# Patient Record
Sex: MALE | Race: WHITE | URBAN - METROPOLITAN AREA
[De-identification: names, ages, dates, MRNs, and addresses within clinical notes are randomized per-mention and may not be internally consistent; named-entity substitution may affect disease eponyms.]

---

## 2017-03-30 ENCOUNTER — OFFICE VISIT (OUTPATIENT)
Dept: ORTHOPEDIC SURGERY | Age: 67
End: 2017-03-30

## 2017-03-30 VITALS
SYSTOLIC BLOOD PRESSURE: 128 MMHG | BODY MASS INDEX: 28.56 KG/M2 | HEIGHT: 67 IN | HEART RATE: 80 BPM | WEIGHT: 182 LBS | DIASTOLIC BLOOD PRESSURE: 74 MMHG

## 2017-03-30 DIAGNOSIS — M79.671 RIGHT FOOT PAIN: ICD-10-CM

## 2017-03-30 DIAGNOSIS — M67.379 TRANSIENT SYNOVITIS, UNSPECIFIED ANKLE AND FOOT: ICD-10-CM

## 2017-03-30 DIAGNOSIS — M79.672 LEFT FOOT PAIN: Primary | ICD-10-CM

## 2017-03-30 PROCEDURE — 3017F COLORECTAL CA SCREEN DOC REV: CPT | Performed by: PODIATRIST

## 2017-03-30 PROCEDURE — 73630 X-RAY EXAM OF FOOT: CPT | Performed by: PODIATRIST

## 2017-03-30 PROCEDURE — G8420 CALC BMI NORM PARAMETERS: HCPCS | Performed by: PODIATRIST

## 2017-03-30 PROCEDURE — 4004F PT TOBACCO SCREEN RCVD TLK: CPT | Performed by: PODIATRIST

## 2017-03-30 PROCEDURE — G8484 FLU IMMUNIZE NO ADMIN: HCPCS | Performed by: PODIATRIST

## 2017-03-30 PROCEDURE — 99203 OFFICE O/P NEW LOW 30 MIN: CPT | Performed by: PODIATRIST

## 2017-03-30 PROCEDURE — 1123F ACP DISCUSS/DSCN MKR DOCD: CPT | Performed by: PODIATRIST

## 2017-03-30 PROCEDURE — G8427 DOCREV CUR MEDS BY ELIG CLIN: HCPCS | Performed by: PODIATRIST

## 2017-03-30 PROCEDURE — 4040F PNEUMOC VAC/ADMIN/RCVD: CPT | Performed by: PODIATRIST

## 2017-03-31 ENCOUNTER — ORTHOTIC/BRACE ENCOUNTER (OUTPATIENT)
Dept: ORTHOPEDIC SURGERY | Age: 67
End: 2017-03-31

## 2017-04-07 ENCOUNTER — ORTHOTIC/BRACE ENCOUNTER (OUTPATIENT)
Dept: ORTHOPEDIC SURGERY | Age: 67
End: 2017-04-07

## 2017-04-07 DIAGNOSIS — M67.379 TRANSIENT SYNOVITIS, UNSPECIFIED ANKLE AND FOOT: Primary | ICD-10-CM

## 2017-04-07 PROCEDURE — L3020 FOOT LONGITUD/METATARSAL SUP: HCPCS | Performed by: PEDORTHIST

## 2017-04-26 ENCOUNTER — ORTHOTIC/BRACE ENCOUNTER (OUTPATIENT)
Dept: ORTHOPEDIC SURGERY | Age: 67
End: 2017-04-26

## 2018-12-17 ENCOUNTER — HOSPITAL ENCOUNTER (OUTPATIENT)
Dept: PHYSICAL THERAPY | Age: 68
Setting detail: THERAPIES SERIES
Discharge: HOME | End: 2018-12-17
Payer: MEDICARE

## 2018-12-17 PROCEDURE — 97140 MANUAL THERAPY 1/> REGIONS: CPT | Performed by: PHYSICAL THERAPIST

## 2018-12-17 PROCEDURE — 97110 THERAPEUTIC EXERCISES: CPT | Performed by: PHYSICAL THERAPIST

## 2018-12-17 PROCEDURE — G8982 BODY POS GOAL STATUS: HCPCS | Performed by: PHYSICAL THERAPIST

## 2018-12-17 PROCEDURE — 97161 PT EVAL LOW COMPLEX 20 MIN: CPT | Performed by: PHYSICAL THERAPIST

## 2018-12-17 PROCEDURE — G8981 BODY POS CURRENT STATUS: HCPCS | Performed by: PHYSICAL THERAPIST

## 2018-12-17 NOTE — FLOWSHEET NOTE
[address] [facility]    [facility] Therapy Daily Treatment Note  Date:  2018    Patient Name:  Daiana Gayle    :  1950  MRN: [id number]  Restrictions/Precautions:    Medical/Treatment Diagnosis Information:  · Diagnosis: Lumbar DDD  · Treatment Diagnosis: Low Back Pain  Insurance/Certification information:   Medicare with Secondary   Physician Information:  Referring Practitioner: Dr. Donita Carbajal of care signed (Y/N):     Date of Patient follow up with Physician:     G-Code (if applicable):      Date G-Code Applied:         Progress Note: []  Yes  []  No  Next due by: Visit #10      Latex Allergy:  [x]NO      []YES  Preferred Language for Healthcare:   [x]English       []other:    Visit # Insurance Allowable   1 Medicare     Auth Required   []  Yes    [x] No    Visits Approved  Date Ranged-     Pain level:  3-5/10     SUBJECTIVE:  See eval    OBJECTIVE: See eval  Observation:   Test measurements:      RESTRICTIONS/PRECAUTIONS: Thyroid Cancer    Exercises/Interventions:       Script-18  Stretching Repetitions/Resistance Nots/Last Progression   SKC 2x30\" each    DKC     Piriformis Cross Over 2x30\" each    Thoracic Ext In Chair 15x5\"    Real Said  3x30\"    Prone on Elbows 2x30\"    Prayer Stretch     Hand Heel Rock     Cat/Cow     LTR                    Exercises     Bridge      SLR Flex     SLR Abd     SLR Ext     Clamshells     TA / Pilar Rias / Bynum Rory 15x5\" PPR   TA March     Prone Plank     Side Plank     Quadriped UE/ LE/ Birddog     Squats     [facility] Kick                 Manual      Hamstring Stretch     SKC     Piriformis Stretch      ITB Stretch      Prone Quad Stretch      Traction x4'    LAD to each hip.  x4' x2' on each hip.     Lumbar PA Grade-      Supine Lumbar Roll     SL Lumbar      Long Axis Hip Distraction Grade       Therapeutic Exercise and NMR EXR  [] (24255) Provided verbal/tactile cueing for as the patients discharge from physical therapy.      Electronically signed by: Les Jay Selestino Danger, [address]

## 2018-12-28 ENCOUNTER — HOSPITAL ENCOUNTER (OUTPATIENT)
Dept: PHYSICAL THERAPY | Age: 68
Setting detail: THERAPIES SERIES
Discharge: HOME | End: 2018-12-28
Payer: MEDICARE

## 2018-12-28 PROCEDURE — 97140 MANUAL THERAPY 1/> REGIONS: CPT | Performed by: PHYSICAL THERAPIST

## 2018-12-28 PROCEDURE — 97110 THERAPEUTIC EXERCISES: CPT | Performed by: PHYSICAL THERAPIST

## 2019-01-03 ENCOUNTER — OFFICE VISIT (OUTPATIENT)
Dept: ORTHOPEDIC SURGERY | Age: 69
End: 2019-01-03
Payer: MEDICARE

## 2019-01-03 VITALS
DIASTOLIC BLOOD PRESSURE: 74 MMHG | BODY MASS INDEX: 28.58 KG/M2 | SYSTOLIC BLOOD PRESSURE: 135 MMHG | WEIGHT: 182.1 LBS | HEART RATE: 87 BPM | HEIGHT: 67 IN

## 2019-01-03 DIAGNOSIS — M77.42 METATARSALGIA OF BOTH FEET: ICD-10-CM

## 2019-01-03 DIAGNOSIS — M20.42 ACQUIRED BILATERAL HAMMER TOES: Primary | ICD-10-CM

## 2019-01-03 DIAGNOSIS — M77.41 METATARSALGIA OF BOTH FEET: ICD-10-CM

## 2019-01-03 DIAGNOSIS — M20.41 ACQUIRED BILATERAL HAMMER TOES: Primary | ICD-10-CM

## 2019-01-03 PROBLEM — M75.102 TEAR OF LEFT ROTATOR CUFF: Status: ACTIVE | Noted: 2018-09-06

## 2019-01-03 PROCEDURE — 4040F PNEUMOC VAC/ADMIN/RCVD: CPT | Performed by: PODIATRIST

## 2019-01-03 PROCEDURE — G8419 CALC BMI OUT NRM PARAM NOF/U: HCPCS | Performed by: PODIATRIST

## 2019-01-03 PROCEDURE — 1123F ACP DISCUSS/DSCN MKR DOCD: CPT | Performed by: PODIATRIST

## 2019-01-03 PROCEDURE — G8428 CUR MEDS NOT DOCUMENT: HCPCS | Performed by: PODIATRIST

## 2019-01-03 PROCEDURE — 99213 OFFICE O/P EST LOW 20 MIN: CPT | Performed by: PODIATRIST

## 2019-01-03 PROCEDURE — G8484 FLU IMMUNIZE NO ADMIN: HCPCS | Performed by: PODIATRIST

## 2019-01-03 PROCEDURE — 4004F PT TOBACCO SCREEN RCVD TLK: CPT | Performed by: PODIATRIST

## 2019-01-03 PROCEDURE — 3017F COLORECTAL CA SCREEN DOC REV: CPT | Performed by: PODIATRIST

## 2019-01-03 PROCEDURE — 1101F PT FALLS ASSESS-DOCD LE1/YR: CPT | Performed by: PODIATRIST

## 2019-01-03 RX ORDER — FLUOROMETHOLONE 0.1 %
SUSPENSION, DROPS(FINAL DOSAGE FORM)(ML) OPHTHALMIC (EYE)
Refills: 6 | COMMUNITY
Start: 2018-12-28

## 2019-01-03 RX ORDER — CLINDAMYCIN HYDROCHLORIDE 300 MG/1
CAPSULE ORAL
Refills: 1 | COMMUNITY
Start: 2018-12-31

## 2019-01-09 ENCOUNTER — HOSPITAL ENCOUNTER (OUTPATIENT)
Dept: PHYSICAL THERAPY | Age: 69
Setting detail: THERAPIES SERIES
Discharge: HOME | End: 2019-01-09
Payer: MEDICARE

## 2019-08-26 ENCOUNTER — OFFICE VISIT (OUTPATIENT)
Dept: ORTHOPEDIC SURGERY | Age: 69
End: 2019-08-26
Payer: MEDICARE

## 2019-08-26 VITALS
SYSTOLIC BLOOD PRESSURE: 130 MMHG | BODY MASS INDEX: 28.58 KG/M2 | DIASTOLIC BLOOD PRESSURE: 70 MMHG | WEIGHT: 182.1 LBS | HEART RATE: 78 BPM | HEIGHT: 67 IN

## 2019-08-26 DIAGNOSIS — M77.41 METATARSALGIA OF BOTH FEET: Primary | ICD-10-CM

## 2019-08-26 DIAGNOSIS — M20.42 ACQUIRED BILATERAL HAMMER TOES: ICD-10-CM

## 2019-08-26 DIAGNOSIS — M77.42 METATARSALGIA OF BOTH FEET: Primary | ICD-10-CM

## 2019-08-26 DIAGNOSIS — M67.379 TRANSIENT SYNOVITIS, UNSPECIFIED ANKLE AND FOOT: ICD-10-CM

## 2019-08-26 DIAGNOSIS — M20.41 ACQUIRED BILATERAL HAMMER TOES: ICD-10-CM

## 2019-08-26 PROCEDURE — 1123F ACP DISCUSS/DSCN MKR DOCD: CPT | Performed by: PODIATRIST

## 2019-08-26 PROCEDURE — 4004F PT TOBACCO SCREEN RCVD TLK: CPT | Performed by: PODIATRIST

## 2019-08-26 PROCEDURE — G8428 CUR MEDS NOT DOCUMENT: HCPCS | Performed by: PODIATRIST

## 2019-08-26 PROCEDURE — 99212 OFFICE O/P EST SF 10 MIN: CPT | Performed by: PODIATRIST

## 2019-08-26 PROCEDURE — 4040F PNEUMOC VAC/ADMIN/RCVD: CPT | Performed by: PODIATRIST

## 2019-08-26 PROCEDURE — 3017F COLORECTAL CA SCREEN DOC REV: CPT | Performed by: PODIATRIST

## 2019-08-26 PROCEDURE — G8419 CALC BMI OUT NRM PARAM NOF/U: HCPCS | Performed by: PODIATRIST

## 2019-08-26 RX ORDER — DOXYCYCLINE HYCLATE 100 MG/1
CAPSULE ORAL
Refills: 0 | COMMUNITY
Start: 2019-08-20

## 2019-08-26 RX ORDER — TRAMADOL HYDROCHLORIDE 50 MG/1
TABLET ORAL
Refills: 0 | COMMUNITY
Start: 2019-06-11

## 2019-08-28 ENCOUNTER — ORTHOTIC/BRACE ENCOUNTER (OUTPATIENT)
Dept: ORTHOPEDIC SURGERY | Age: 69
End: 2019-08-28

## 2019-09-06 ENCOUNTER — ORTHOTIC/BRACE ENCOUNTER (OUTPATIENT)
Dept: ORTHOPEDIC SURGERY | Age: 69
End: 2019-09-06
Payer: MEDICARE

## 2019-09-06 DIAGNOSIS — M77.42 METATARSALGIA OF BOTH FEET: Primary | ICD-10-CM

## 2019-09-06 DIAGNOSIS — M77.41 METATARSALGIA OF BOTH FEET: Primary | ICD-10-CM

## 2019-09-06 DIAGNOSIS — M20.42 ACQUIRED BILATERAL HAMMER TOES: ICD-10-CM

## 2019-09-06 DIAGNOSIS — M20.41 ACQUIRED BILATERAL HAMMER TOES: ICD-10-CM

## 2019-09-06 DIAGNOSIS — M67.379 TRANSIENT SYNOVITIS, UNSPECIFIED ANKLE AND FOOT: ICD-10-CM

## 2019-09-06 PROCEDURE — L3020 FOOT LONGITUD/METATARSAL SUP: HCPCS | Performed by: PEDORTHIST

## 2021-06-08 ENCOUNTER — OFFICE VISIT (OUTPATIENT)
Dept: ORTHOPEDIC SURGERY | Age: 71
End: 2021-06-08
Payer: MEDICARE

## 2021-06-08 DIAGNOSIS — M19.079 ARTHRITIS OF MIDFOOT: Primary | ICD-10-CM

## 2021-06-08 DIAGNOSIS — M20.42 ACQUIRED BILATERAL HAMMER TOES: ICD-10-CM

## 2021-06-08 DIAGNOSIS — M20.41 ACQUIRED BILATERAL HAMMER TOES: ICD-10-CM

## 2021-06-08 PROCEDURE — G8427 DOCREV CUR MEDS BY ELIG CLIN: HCPCS | Performed by: ORTHOPAEDIC SURGERY

## 2021-06-08 PROCEDURE — 99213 OFFICE O/P EST LOW 20 MIN: CPT | Performed by: ORTHOPAEDIC SURGERY

## 2021-06-08 PROCEDURE — 1123F ACP DISCUSS/DSCN MKR DOCD: CPT | Performed by: ORTHOPAEDIC SURGERY

## 2021-06-08 PROCEDURE — 3017F COLORECTAL CA SCREEN DOC REV: CPT | Performed by: ORTHOPAEDIC SURGERY

## 2021-06-08 PROCEDURE — 4040F PNEUMOC VAC/ADMIN/RCVD: CPT | Performed by: ORTHOPAEDIC SURGERY

## 2021-06-08 PROCEDURE — G8421 BMI NOT CALCULATED: HCPCS | Performed by: ORTHOPAEDIC SURGERY

## 2021-06-08 PROCEDURE — 4004F PT TOBACCO SCREEN RCVD TLK: CPT | Performed by: ORTHOPAEDIC SURGERY

## 2021-06-08 NOTE — PROGRESS NOTES
[facility]  Outpatient Progress Note  Will Sulema Leyva MD    Patient Name: Tien Hoang MRN: [id number]   Age: 66 y.o. YOB: 1950   Sex: male      [address] Complaint   Patient presents with    Foot Pain     Bilateral foot pain previous treatment wants to discuss orthotics rx        HISTORY OF PRESENT ILLNESS   Tien Hoang is a 66 y.o. male presents for evaluation of his feet. Has previously seen Dr. Jolynn Douglas and is treated with orthotics for midfoot arthritis and hammertoes. He is not really having any pain but he thinks he is ready for some new orthotics. He does quite a bit of walking for exercise and recreation. Pain Assessment  Location of Pain: Foot  Location Modifiers: Left  Severity of Pain: 2  Quality of Pain: Aching  Duration of Pain: Persistent  Frequency of Pain: Intermittent  Aggravating Factors: Exercise, Standing, Walking  Limiting Behavior: Some  Relieving Factors: Rest  Result of Injury: No  Work-Related Injury: No  Are there other pain locations you wish to document?: No    PAST MEDICAL HISTORY      Past Medical History:   Diagnosis Date    Thyroid cancer ([facility] [address] 1998       PAST SURGICAL HISTORY     Past Surgical History:   Procedure Laterality Date    COLONOSCOPY  1995    (no info)    HEMORRHOID SURGERY  1999    HERNIA REPAIR      SHOULDER ARTHROSCOPY  2009    right    SHOULDER ARTHROSCOPY  11/14/12    left    THROAT SURGERY      2009 for thyroid tissue removal    TOTAL THYROIDECTOMY  1998       MEDICATIONS     Current Outpatient Medications   Medication Sig Dispense Refill    traMADol (ULTRAM) 50 MG tablet TAKE 1 TABLET PO TID PRN FOR BACK PAIN  0    fluorometholone (FML) 0.1 % ophthalmic suspension INSTILL 1 DROP INTO BOTH EYES UP TO 2 TIMES PER DAY  6    levocetirizine (XYZAL) 5 MG tablet Take 5 mg by mouth nightly.  Ginkgo Biloba 40 MG TABS Take  by mouth.         levothyroxine (SYNTHROID) 300 MCG tablet Take 300 mcg by mouth daily.  amphetamine-dextroamphetamine (ADDERALL, 30MG,) 30 MG tablet Take 30 mg by mouth daily.  GuaiFENesin (MUCINEX PO) Take  by mouth 2 times daily.  Omega-3 Fatty Acids (FISH OIL) 1000 MG CAPS Take 1,000 mg by mouth 2 times daily.  vitamin E 400 UNIT capsule Take 400 Units by mouth daily.  Multiple Vitamins-Minerals (MULTIVITAMIN & MINERAL PO) Take  by mouth daily.  doxycycline hyclate (VIBRAMYCIN) 100 MG capsule TK ONE C PO BID (Patient not taking: Reported on 6/8/2021)  0    clindamycin (CLEOCIN) 300 MG capsule TK ONE C PO QID (Patient not taking: Reported on 6/8/2021)  1     No current facility-administered medications for this visit. ALLERGIES     Allergies   Allergen Reactions    Alum Hydroxide-Mag Carbonate     Sulfa Antibiotics Swelling, Rash and Hives       FAMILY HISTORY     Family History   Problem Relation Age of Onset    High Blood Pressure Mother        SOCIAL HISTORY     Social History     Socioeconomic History    Marital status:      Spouse name: Not on file    Number of children: Not on file    Years of education: Not on file    Highest education level: Not on file   Occupational History    Not on file   Tobacco Use    Smoking status: Current Every Day Smoker     Packs/day: 0.50     Years: 40.00     Pack years: 20.00    Smokeless tobacco: Never Used   Substance and Sexual Activity    Alcohol use:  Yes     Alcohol/week: 3.0 standard drinks     Types: 3 Glasses of wine per week     Comment: 2x week    Drug use: No    Sexual activity: Not on file   Other Topics Concern    Not on file   Social History Narrative    Not on file     Social Determinants of Health     Financial Resource Strain:     Difficulty of Paying Living Expenses:    Food Insecurity:     Worried About Running Out of Food in the Last Year:     [address] in the Last Year:    Transportation Needs:     Lack of Transportation (Medical):    Logan Crandall Lack of Transportation (Non-Medical):    Physical Activity:     Days of Exercise per Week:     Minutes of Exercise per Session:    Stress:     Feeling of Stress :    Social Connections:     Frequency of Communication with Friends and Family:     Frequency of Social Gatherings with Friends and Family:     Attends Holiness Services:     Active Member of Clubs or Organizations:     Attends Club or Organization Meetings:     Marital Status:    Intimate Partner Violence:     Fear of Current or Ex-Partner:     Emotionally Abused:     Physically Abused:     Sexually Abused:        REVIEW OF SYSTEMS   General: no fever, chills, night sweats, anorexia, malaise, fatigue, or weight change  Hematologic:  no unexplained bleeding or bruising  HEENT:   no nasal congestion, rhinorrhea, sore throat, or facial pain  Respiratory:  no cough, dyspnea, or chest pain  Cardiovascular:  no angina, BARNARD, PND, orthopnea, dependent edema, or palpitations  Gastrointestinal:  no nausea, vomiting, diarrhea, constipation, or abdominal pain  Genitourinary:  no urinary urgency, frequency, dysuria, or hematuria  Musculoskeletal: see HPI  Endocrine:  no heat or cold intolerance and no polyphagia, polydipsia, or polyuria  Skin:  no skin eruptions or changing lesions  Neurologic:  no focal weakness, numbness/tingling, tremor, or severe headache. See HPI. See HPI for pertinent positives. PHYSICAL EXAM   Vital Signs: There were no vitals filed for this visit. General appearance: healthy, alert, no distress  Skin: Skin color, texture, turgor normal. No rashes or lesions  HEENT: atraumatic, normocephalic. PERRL  Respiratory: Unlabored breathing  Lymphatic: No adenopathy   Neuro: Alert and oriented, normal distal sensation, normal bilateral DTRs  Vascular: Normal distal capillary and distal pulses  Muskuloskeletal Exam: Bilateral foot examination reveals no swelling erythema warmth ecchymosis or edema.   Mild flexible hammertoe deformities of the bilateral second toes. No callosities or skin lesions. Weightbearing alignment of the ankle hindfoot midfoot and forefoot are otherwise neutral.  Gait is normal.    RADIOLOGY   X-rays obtained and reviewed in office:  Views bilateral feet standing 3 views    Impression: Moderate degenerative changes noted in the mid feet at the naviculocuneiform joint    IMPRESSION     1. Arthritis of midfoot    2. Acquired bilateral hammer toes         PLAN   I had a lengthy discussion with patient today regarding diagnosis and treatment options and recommendations. I believe the patient will continue to tolerate his current level of activity with accommodative structured shoe wear and custom orthotics. He was given prescription for full-length semirigid orthotics as well as three-quarter length rigid orthotics for his dress shoes. FOLLOWUP     Return if symptoms worsen or fail to improve. Orders Placed This Encounter   Procedures    XR FOOT LEFT (MIN 3 VIEWS)     Standing Status:   Future     Number of Occurrences:   1     Standing Expiration Date:   6/7/2022     Order Specific Question:   Reason for exam:     Answer:   PAIN    XR FOOT RIGHT (MIN 3 VIEWS)     Standing Status:   Future     Number of Occurrences:   1     Standing Expiration Date:   6/7/2022     Order Specific Question:   Reason for exam:     Answer:   PAIN      No orders of the defined types were placed in this encounter.       Patient was instructed on appropriate use of braces, participation in home exercise programs, healthy lifestyle choices and weight loss as appropriate     Liberty Blanco MD

## 2021-10-05 ENCOUNTER — HOSPITAL ENCOUNTER (OUTPATIENT)
Dept: PHYSICAL THERAPY | Age: 71
Setting detail: THERAPIES SERIES
Discharge: HOME | End: 2021-10-05
Payer: MEDICARE

## 2021-10-05 PROCEDURE — 97110 THERAPEUTIC EXERCISES: CPT | Performed by: PHYSICAL THERAPIST

## 2021-10-05 PROCEDURE — 97161 PT EVAL LOW COMPLEX 20 MIN: CPT | Performed by: PHYSICAL THERAPIST

## 2021-10-05 PROCEDURE — 97012 MECHANICAL TRACTION THERAPY: CPT | Performed by: PHYSICAL THERAPIST

## 2021-10-05 PROCEDURE — 97140 MANUAL THERAPY 1/> REGIONS: CPT | Performed by: PHYSICAL THERAPIST

## 2021-10-05 NOTE — PLAN OF CARE
standing. Relevant Medical History:Cancer  Functional Disability Index: NDI: 2%      Pain Scale: 5/10  Easing factors: ibuprofen  Provocative factors: computer work, neck extension     Type: []Constant   [x]Intermittent  []Radiating [x]Localized []other:     Numbness/Tingling: none    Occupation/School:        Living Status/Prior Level of Function: Independent with ADLs and IADLs    OBJECTIVE:     CERV ROM Left Right   Cervical Flexion 45    Cervical Extension 30 pain    Cervical SB 20 20   Cervical rotation 60 60        ROM Left-WNL Right-WNL   Shoulder Flex     Shoulder Abd     Shoulder ER     Shoulder IR               Strength  Left Right   Shoulder Flex 5 5   Shoulder Scap 5 5   Shoulder ER 5 5   Shoulder IR 5 5             Reflexes Left Right   C5-6 Biceps 2 2   C5-6 Brachioradialis 2 2   C7-8 Triceps 2 2     Dermatomal Sensation:  - Dermatomal sensation was intact to light touch bilaterally throughout upper and lower extremities. Reflexes/Sensation:    [x]Dermatomes/Myotomes intact    [x]Reflexes equal and normal bilaterally   []Other:    Joint mobility:    []Normal    [x]Hypo   []Hyper    Palpation: sub-occipitals, L > R UT, levator    Functional Mobility/Transfers: WNL    Posture: mild forward, rounded shoulders    Bandages/Dressings/Incisions: n/a    Gait: (include devices/WB status): WNL     Orthopedic Special Tests:   Special Tests/Functional tests:  - Mcpherson's test: [] Positive   [] Negative   - Babinski: [] Positive   [] Negative   - Clonus: [] Positive   [x] Negative   - Sharp's Danish: [] Positive   [] Negative   - Modified C2 Kick Test: [] Positive   [] Negative   - Vertebral Artery Test: [] Positive   [x] Negative   -Hautard's Test: [] Positive   [] Negative   -Sheer Test: [] Positive   [] Negative     [x] Patient history, allergies, meds reviewed. Medical chart reviewed. See intake form.      Review Of Systems (ROS):  [x]Performed Review of systems (Integumentary, CardioPulmonary, Neurological) by intake and observation. Intake form has been scanned into medical record. Patient has been instructed to contact their primary care physician regarding ROS issues if not already being addressed at this time. Co-morbidities/Complexities (which will affect course of rehabilitation):   []None           Arthritic conditions   []Rheumatoid arthritis (M05.9)  []Osteoarthritis (M19.91)   Cardiovascular conditions   []Hypertension (I10)  []Hyperlipidemia (E78.5)  []Angina pectoris (I20)  []Atherosclerosis (I70)   Musculoskeletal conditions   []Disc pathology   []Congenital spine pathologies   []Prior surgical intervention  []Osteoporosis (M81.8)  []Osteopenia (M85.8)   Endocrine conditions   []Hypothyroid (E03.9)  []Hyperthyroid Gastrointestinal conditions   []Constipation (H52.92)   Metabolic conditions   []Morbid obesity (E66.01)  []Diabetes type 1(E10.65) or 2 (E11.65)   []Neuropathy (G60.9)     Pulmonary conditions   []Asthma (J45)  []Coughing   []COPD (J44.9)   Psychological Disorders  []Anxiety (F41.9)  []Depression (F32.9)   []Other:   [x]Other:    Hx of cancer, bilateral shoulder surgery          PACEMAKER:  - Denied having a pacemaker that would contraindicate the use of electrical modalities. METAL IMPLANTS:  - Denied metal implants that would contraindicate the use of thermal modalities. CANCER HISTORY:  - Has a history of cancer that would make the use of thermal modalities contraindicated. Barriers to/and or personal factors that will affect rehab potential:              []Age  []Sex              []Motivation/Lack of Motivation                        [x]Co-Morbidities              []Cognitive Function, education/learning barriers              []Environmental, home barriers              []profession/work barriers  []past PT/medical experience  []other:  Justification:     Falls Risk Assessment (30 days):   [x] Falls Risk assessed and no intervention required.   [] Falls Risk assessed and Patient requires intervention due to being higher risk   TUG score (>12s at risk):     [] Falls education provided, including           ASSESSMENT:    Functional Impairments:     [x]Noted cervical/thoracic/GHJ joint hypomobility   []Noted cervical/thoracic/GHJ joint hypermobility   [x]Decreased cervical/UE functional ROM   [x]Noted Headache pain aggravated by neck movements with/without dizziness   []Abnormal reflexes/sensation/myotomal/dermatomal deficits   []Decreased DCF control or ability to hold head up   []Decreased RC/scapular/core strength and neuromuscular control    []Decreased UE functional strength   []other:      Functional Activity Limitations (from functional questionnaire and intake)   []Reduced ability to tolerate prolonged functional positions   []Reduced ability or difficulty with changes of positions or transfers between positions   [x]Reduced ability to maintain good posture and demonstrate good body mechanics with sitting, bending, and lifting   [x] Reduced ability or tolerance with driving and/or computer work   []Reduced ability to perform lifting, reaching, carrying tasks   []Reduced ability to concentrate   [x]Reduced ability to sleep    []Reduced ability to tolerate any impact through UE or spine   []Reduced ability to ambulate prolonged functional periods/distances   []other:    Participation Restrictions   []Reduced participation in self care activities   []Reduced participation in home management activities   [x]Reduced participation in work activities   []Reduced participation in social activities. []Reduced participation in sport/recreational activities.     Classification/Subgrouping:   [x]signs/symptoms consistent with neck pain with mobility deficits     []signs/symptoms consistent with neck pain with movement coordinated impairments    []signs/symptoms consistent with neck pain with radiating pain    []signs/symptoms consistent with neck pain with complexity using standardized patient assessment instrument and/or measurable assessment of functional outcome. [x] EVAL (LOW) 24054 (typically 20 minutes face-to-face)  [] EVAL (MOD) 65142 (typically 30 minutes face-to-face)  [] EVAL (HIGH) 74032 (typically 45 minutes face-to-face)  [] RE-EVAL     PLAN:   Frequency/Duration:  1-2 days per week for 4 Weeks:  Interventions:  []  Therapeutic exercise including: strength training, ROM, for cervical spine,scapula, core and Upper extremity, including postural re-education. []  NMR activation and proprioception for Deep cervical flexors, periscapular and RC muscles and Core, including postural re-education. []  Manual therapy as indicated for C/T spine, ribs, Soft tissue to include: Dry Needling/IASTM, STM, PROM, Gr I-IV mobilizations, manipulation. [] Modalities as needed that may include: thermal agents, E-stim, Biofeedback, US, iontophoresis as indicated  [] Patient education on joint protection, postural re-education, activity modification, progression of HEP. HEP instruction:(see scanned forms)    GOALS:  Patient stated goal: increase neck mobility, decrease pain    Therapist goals for Patient:       Short Term Goals: To be achieved in: 2 weeks  1. Independent in HEP and progression per patient tolerance, in order to prevent re-injury. [] Progressing: [] Met: [] Not Met: [] Adjusted   2. Patient will have a decrease in pain to facilitate improvement in movement, function, and ADLs as indicated by Functional Deficits. [] Progressing: [] Met: [] Not Met: [] Adjusted    Long Term Goals: To be achieved in: 4 weeks  1. Disability index score of 0% or less for the NDI to assist with reaching prior level of function. [] Progressing: [] Met: [] Not Met: [] Adjusted   2. Patient will demonstrate increased AROM to [facility] of cervical/thoracic spine to allow for proper joint functioning as indicated by patients Functional Deficits.    [] Progressing: [] Met: [] Not Met: [] Adjusted  3. Patient will demonstrate an increase in postural awareness and control and activation of  Deep cervical stabilizers to allow for proper functional mobility as indicated by patients Functional Deficits. [] Progressing: [] Met: [] Not Met: [] Adjusted  4. Patient will return to doing 2 hours of computer work, functional activities without increased symptoms or restriction.    [] Progressing: [] Met: [] Not Met: [] Adjusted       Electronically signed by:  Nic Cervantes PT, OMT-C

## 2021-10-05 NOTE — FLOWSHEET NOTE
[facility]      Date:  10/5/2021    Patient Name:  Tejinder Roach    :  1950  MRN: [id number]  Restrictions/Precautions:    Medical/Treatment Diagnosis Information:  · Diagnosis: Neck/shoulder pain M54.2  · Treatment Diagnosis: PT treatment diagnosis:  neck pain  Z95.1  Insurance/Certification information:  PT Insurance Information: Medicare  Physician Information:  Referring Practitioner: Dr Wes Chan of care signed (Y/N):     Date of Patient follow up with Physician:   I  s this a Progress Report:     []  Yes  [x]  No        If Yes:  Date Range for reporting period:  Beginning  Ending    Progress report will be due (10 Rx or 30 days whichever is less):        Recertification will be due (POC Duration  / 90 days whichever is less): 21     Date of Surgery:        Visit # Insurance Allowable Auth Required   1 BMN []  Yes []  No        Functional Scale:     Date assessed:        Latex Allergy:  [x]NO      []YES  Preferred Language for Healthcare:   [x]English       []other      Pain level:  5/10     SUBJECTIVE:  See eval    Type: []Constant   []Intermitment  []Radiating []Localized  []other:     Functional Limitations: []Sitting []Standing []Walking    []Squatting []Stairs                []ADL's  []Driving []Sports/Recreation  []Lifting/reaching     []Grooming  []Carrying []ADL's  []Driving       OBJECTIVE:     ROM Current (R) Current (L)                       Strength                           Gait:     Joint mobility:    []Normal    []Hypo   []Hyper    Palpation:     Orthopedic Tests:     RESTRICTIONS/PRECAUTIONS: Hx of cancer, bilateral shoulder surgery    Exercises/Interventions:     Access Code: [email or web address]  URL: [email or web address]  Date: 10/05/2021  Prepared by: Lilia Hathaway    Exercises  Supine Chin Tuck - 1-2 x daily - 7 x weekly - 10 reps - 10 seconds hold  Doorway Pec Stretch at 90 Degrees Abduction - 1-2 x daily - 7 x weekly - 3 reps - 30 seconds hold  Seated Cervical Sidebending Stretch - 1-2 x daily - 7 x weekly - 3 reps - 30 seconds hold  Shoulder External Rotation and Scapular Retraction - 1-2 x daily - 7 x weekly - 30 reps    Exercise/Equipment Resistance/Repetitions Other/Last Progression          Stretching     Upper Trap Stretch 3x30'' B    Levator Scapula Stretch     Scalene Belt Stretch     Bear Hug Stretch      Corner Stretch  3x30''    Table Pec Minor Stretch     Cross Arm Stretch            Exercises     Chin Tucks Seated/Supine 10x10''    Headlifts     SA Punch      Prone Row/Ext/ HAB/ Scap     TB Rows/ Ext     TB ER/ IR     No Money/ HAB 30x/--    Cervical Isometrics            Manual     Cervical Traction 3'    Cervical PA Grade- 3'    Cervical Lateral Glide Grade- 3'    Upper Trap Stretch 3'    Levator Scapula Stretch     Thoracic PA Grade- 3'                            Therapeutic Exercise and NMR EXR  [] (01329) Provided verbal/tactile cueing for activities related to strengthening, flexibility, endurance, ROM  for improvements in cervical, postural, scapular, scapulothoracic and UE control with self care, reaching, carrying, lifting, house/yardwork, driving/computer work.    [] (66568) Provided verbal/tactile cueing for activities related to improving balance, coordination, kinesthetic sense, posture, motor skill, proprioception  to assist with cervical, scapular, scapulothoracic and UE control with self care, reaching, carrying, lifting, house/yardwork, driving/computer work. Therapeutic Activities:    [] (29966 or 45390) Provided verbal/tactile cueing for activities related to improving balance, coordination, kinesthetic sense, posture, motor skill, proprioception and motor activation to allow for proper function of cervical, scapular, scapulothoracic and UE control with self care, carrying, lifting, driving/computer work.      Home Exercise Program:    [x] (88983) Reviewed/Progressed HEP activities related to strengthening, flexibility, endurance, ROM of cervical, scapular, scapulothoracic and UE control with self care, reaching, carrying, lifting, house/yardwork, driving/computer work  [] (05205) Reviewed/Progressed HEP activities related to improving balance, coordination, kinesthetic sense, posture, motor skill, proprioception of cervical, scapular, scapulothoracic and UE control with self care, reaching, carrying, lifting, house/yardwork, driving/computer work      Manual Treatments:    [x] (83805) Provided manual therapy to mobilize soft tissue/joints of cervical/CT, scapular GHJ and UE for the purpose of decreasing headache, modulating pain, promoting relaxation,  increasing ROM, reducing/eliminating soft tissue swelling/inflammation/restriction, improving soft tissue extensibility and allowing for proper ROM for normal function with self care, reaching, carrying, lifting, house/yardwork, driving/computer work    Modalities:  [facility] Txn 20#/10# x10'    Charges:  Timed Code Treatment Minutes: 40   Total Treatment Minutes: 50     [x] EVAL (LOW) 45401 (typically 20 minutes face-to-face)  [] EVAL (MOD) 08455 (typically 30 minutes face-to-face)  [] EVAL (HIGH) 80271 (typically 45 minutes face-to-face)  [] RE-EVAL     [x] EU(13735) x 1     [] IONTO  [] NMR (78094) x     [] VASO  [x] Manual (19534) x 1      [] Other:  [] TA x      [x] [facility] Traction (18571)  [] ES(attended) (68183)      [] ES (un) (75985):     GOALS:  Short Term Goals: To be achieved in: 2 weeks  1. Independent in HEP and progression per patient tolerance, in order to prevent re-injury. [] Progressing: [] Met: [] Not Met: [] Adjusted   2. Patient will have a decrease in pain to facilitate improvement in movement, function, and ADLs as indicated by Functional Deficits. [] Progressing: [] Met: [] Not Met: [] Adjusted    Long Term Goals: To be achieved in: 4 weeks  1.  Disability index score of 0% or less for the NDI to assist with reaching prior

## 2024-09-29 ENCOUNTER — APPOINTMENT (OUTPATIENT)
Dept: CT IMAGING | Age: 74
End: 2024-09-29
Payer: MEDICARE

## 2024-09-29 ENCOUNTER — HOSPITAL ENCOUNTER (EMERGENCY)
Age: 74
Discharge: HOME | End: 2024-09-29
Attending: EMERGENCY MEDICINE
Payer: MEDICARE

## 2024-09-29 VITALS
OXYGEN SATURATION: 96 % | WEIGHT: 206.4 LBS | BODY MASS INDEX: 32.39 KG/M2 | DIASTOLIC BLOOD PRESSURE: 79 MMHG | HEART RATE: 67 BPM | TEMPERATURE: 98 F | SYSTOLIC BLOOD PRESSURE: 157 MMHG | RESPIRATION RATE: 18 BRPM | HEIGHT: 67 IN

## 2024-09-29 DIAGNOSIS — S09.90XA INJURY OF HEAD, INITIAL ENCOUNTER: Primary | ICD-10-CM

## 2024-09-29 PROCEDURE — 72125 CT NECK SPINE W/O DYE: CPT

## 2024-09-29 PROCEDURE — 99284 EMERGENCY DEPT VISIT MOD MDM: CPT

## 2024-09-29 PROCEDURE — 6370000000 HC RX 637 (ALT 250 FOR IP)

## 2024-09-29 PROCEDURE — 70450 CT HEAD/BRAIN W/O DYE: CPT

## 2024-09-29 PROCEDURE — 90715 TDAP VACCINE 7 YRS/> IM: CPT

## 2024-09-29 PROCEDURE — 6360000002 HC RX W HCPCS

## 2024-09-29 PROCEDURE — 90471 IMMUNIZATION ADMIN: CPT

## 2024-09-29 RX ORDER — ACETAMINOPHEN 500 MG
1000 TABLET ORAL ONCE
Status: COMPLETED | OUTPATIENT
Start: 2024-09-29 | End: 2024-09-29

## 2024-09-29 RX ADMIN — TETANUS TOXOID, REDUCED DIPHTHERIA TOXOID AND ACELLULAR PERTUSSIS VACCINE, ADSORBED 0.5 ML: 5; 2.5; 8; 8; 2.5 SUSPENSION INTRAMUSCULAR at 14:32

## 2024-09-29 RX ADMIN — ACETAMINOPHEN 1000 MG: 500 TABLET ORAL at 14:32

## 2024-09-29 ASSESSMENT — PAIN - FUNCTIONAL ASSESSMENT: PAIN_FUNCTIONAL_ASSESSMENT: 0-10

## 2024-09-29 ASSESSMENT — PAIN SCALES - GENERAL
PAINLEVEL_OUTOF10: 8
PAINLEVEL_OUTOF10: 8
PAINLEVEL_OUTOF10: 7

## 2024-09-29 ASSESSMENT — PAIN DESCRIPTION - DESCRIPTORS: DESCRIPTORS: ACHING

## 2024-09-29 ASSESSMENT — PAIN DESCRIPTION - LOCATION: LOCATION: HEAD

## 2024-09-29 NOTE — ED TRIAGE NOTES
Pt arrives in triage c/o trip and fall onto concrete and has goose egg on forehead. No LOC. No thinners.

## 2024-09-29 NOTE — ED PROVIDER NOTES
THE [facility]  EMERGENCY DEPARTMENT ENCOUNTER          EM RESIDENT NOTE       Date of evaluation: 9/29/2024    Chief Complaint     Fall      History of Present Illness     Karan Fortune is a 74 y.o. male who presents to the ED headache s/p trip and fall just prior to arrival onto concrete. Denies getting dizziness or LOC. NO chest pain or SOB. Denies any pain or injuries other than head. Tentanus shot out of date. He is not anticoagulated.    MEDICAL DECISION MAKING / ASSESSMENT / PLAN     INITIAL VITALS: BP: (!) 157/79, Temp: 98 °F (36.7 °C), Pulse: 67, Respirations: 18, SpO2: 96 %    Karan Fortune is a 74 y.o. male presenting to the ED s/p mechanical fall with head injury. No pain or tenderness other than head. Mentating appropriately. Will obtain CT head and neck to r/o intracranial injury, skull fx, or cervical spine fx. Tylenol ordered for pain. Tetanus updated. Patient does have a left subconjunctival hemorrhage, however, family member at bedside states that he get them often and it was present prior to fall. Visual acuity intact.    CT head shows subgaleal hematoma but no fx or ICH. No C spine fx. Patient will be discharged home. Pressure dressing applied to hematoma. Motrin/Tylenol for pain. Return to the ER for new or worsening sx, including confusion, vomiting, vision changes, any other concerns. Patient verbalized understanding of this discussion and agrees with the plan. All of his questions were answered.    Is this patient to be included in the SEP-1 core measure? No Exclusion criteria - the patient is NOT to be included for SEP-1 Core Measure due to: Infection is not suspected    Medical Decision Making  Amount and/or Complexity of Data Reviewed  Radiology: ordered.    Risk  OTC drugs.  Prescription drug management.        This patient was also evaluated by the attending physician. All care plans werediscussed and agreed upon.    Clinical Impression     1. Injury of head, initial

## 2024-09-29 NOTE — DISCHARGE INSTRUCTIONS
You were seen in the emergency department after head injury.  There was no sign of any skull fracture or bleeding inside your brain.  There was no neck fracture.  You do have a large but we call hematoma to your head.  This is basically a lot of swelling.  Please take Motrin, Tylenol as needed for pain.  Icing the area will help the swelling go down faster.  So we will compression if you are able to put a pressure dressing around it, but you do not need to.  As discussed, all of the swelling will likely travel down your face and it is likely that you will end up with a black eye.  Please follow-up with your doctor.  Return to the emergency department for any new or worsening symptoms, including blurry vision, confusion, vomiting, any other concerns

## 2024-09-29 NOTE — ED NOTES
Patient discharged to home via family.  Written discharge instructions reviewed with understanding.  Copy of AVS sent home with patient. Patient able to walk from ED without assistance.       Sonya Aparicio RN  09/29/24 0753

## 2024-09-29 NOTE — ED PROVIDER NOTES
ED Attending Attestation Note    Date of service:  09/29/24    This patient was seen by the resident physician.  I have seen and examined the patient, agree with the workup, evaluation, management and diagnosis. The care plan has been discussed and I concur.     My assessment reveals a 74 y.o. male ED after fall.  On my examination he is in no acute distress.  Patient has a large 4 x 5 cm hematoma over his left supraorbital ridge and forehead.  He does have subconjunctival hemorrhage over his left medial sclera however states that this was prior to fall.  CT head and C-spine imaging are negative for acute pathology.    Tyrel Leonardo MD  Emergency Medicine  [facility]     Tyrel Leonardo MD  10/01/24 [id number]

## 2024-09-30 ASSESSMENT — ENCOUNTER SYMPTOMS
GASTROINTESTINAL NEGATIVE: 1
ALLERGIC/IMMUNOLOGIC NEGATIVE: 1
RESPIRATORY NEGATIVE: 1
EYES NEGATIVE: 1

## 2025-01-21 ENCOUNTER — APPOINTMENT (OUTPATIENT)
Dept: GENERAL RADIOLOGY | Age: 75
DRG: 835 | End: 2025-01-21
Payer: MEDICARE

## 2025-01-21 ENCOUNTER — HOSPITAL ENCOUNTER (INPATIENT)
Age: 75
LOS: 23 days | Discharge: HOME | DRG: 835 | End: 2025-02-13
Attending: EMERGENCY MEDICINE | Admitting: STUDENT IN AN ORGANIZED HEALTH CARE EDUCATION/TRAINING PROGRAM
Payer: MEDICARE

## 2025-01-21 DIAGNOSIS — C95.00 ACUTE LEUKEMIA OF UNSPECIFIED CELL TYPE NOT HAVING ACHIEVED REMISSION (HCC): Primary | ICD-10-CM

## 2025-01-21 DIAGNOSIS — C95.90 LEUKEMIA NOT HAVING ACHIEVED REMISSION, UNSPECIFIED LEUKEMIA TYPE (HCC): ICD-10-CM

## 2025-01-21 PROBLEM — C92.40 ACUTE PROMYELOCYTIC LEUKEMIA NOT HAVING ACHIEVED REMISSION (HCC): Status: ACTIVE | Noted: 2025-01-21

## 2025-01-21 LAB
ACANTHOCYTES BLD QL SMEAR: ABNORMAL
ALBUMIN SERPL-MCNC: 4 G/DL (ref 3.4–5)
ALP SERPL-CCNC: 51 U/L (ref 40–129)
ALT SERPL-CCNC: 7 U/L (ref 10–40)
ANION GAP SERPL CALCULATED.3IONS-SCNC: 10 MMOL/L (ref 3–16)
ANION GAP SERPL CALCULATED.3IONS-SCNC: 10 MMOL/L (ref 3–16)
ANISOCYTOSIS BLD QL SMEAR: ABNORMAL
AST SERPL-CCNC: 17 U/L (ref 15–37)
BASOPHILS # BLD: 0 K/UL (ref 0–0.2)
BASOPHILS NFR BLD: 0 %
BILIRUB DIRECT SERPL-MCNC: 0.3 MG/DL (ref 0–0.3)
BILIRUB INDIRECT SERPL-MCNC: 0.4 MG/DL (ref 0–1)
BILIRUB SERPL-MCNC: 0.7 MG/DL (ref 0–1)
BLASTS NFR BLD MANUAL: 1 %
BUN SERPL-MCNC: 13 MG/DL (ref 7–20)
BUN SERPL-MCNC: 14 MG/DL (ref 7–20)
CALCIUM SERPL-MCNC: 8.6 MG/DL (ref 8.3–10.6)
CALCIUM SERPL-MCNC: 8.9 MG/DL (ref 8.3–10.6)
CHLORIDE SERPL-SCNC: 102 MMOL/L (ref 99–110)
CHLORIDE SERPL-SCNC: 103 MMOL/L (ref 99–110)
CO2 SERPL-SCNC: 25 MMOL/L (ref 21–32)
CO2 SERPL-SCNC: 25 MMOL/L (ref 21–32)
CREAT SERPL-MCNC: 0.7 MG/DL (ref 0.8–1.3)
CREAT SERPL-MCNC: 0.8 MG/DL (ref 0.8–1.3)
DACRYOCYTES BLD QL SMEAR: ABNORMAL
DEPRECATED RDW RBC AUTO: 15.4 % (ref 12.4–15.4)
EOSINOPHIL # BLD: 0 K/UL (ref 0–0.6)
EOSINOPHIL NFR BLD: 0 %
GFR SERPLBLD CREATININE-BSD FMLA CKD-EPI: >90 ML/MIN/{1.73_M2}
GFR SERPLBLD CREATININE-BSD FMLA CKD-EPI: >90 ML/MIN/{1.73_M2}
GLUCOSE SERPL-MCNC: 115 MG/DL (ref 70–99)
GLUCOSE SERPL-MCNC: 98 MG/DL (ref 70–99)
HCT VFR BLD AUTO: 27.6 % (ref 40.5–52.5)
HGB BLD-MCNC: 9.4 G/DL (ref 13.5–17.5)
HYPOCHROMIA BLD QL SMEAR: ABNORMAL
LDH SERPL L TO P-CCNC: 197 U/L (ref 100–190)
LYMPHOCYTES # BLD: 0.6 K/UL (ref 1–5.1)
LYMPHOCYTES NFR BLD: 56 %
MACROCYTES BLD QL SMEAR: ABNORMAL
MAGNESIUM SERPL-MCNC: 2.15 MG/DL (ref 1.8–2.4)
MCH RBC QN AUTO: 35.1 PG (ref 26–34)
MCHC RBC AUTO-ENTMCNC: 34.1 G/DL (ref 31–36)
MCV RBC AUTO: 103.2 FL (ref 80–100)
MICROCYTES BLD QL SMEAR: ABNORMAL
MONOCYTES # BLD: 0.1 K/UL (ref 0–1.3)
MONOCYTES NFR BLD: 5 %
NEUTROPHILS # BLD: 0.4 K/UL (ref 1.7–7.7)
NEUTROPHILS NFR BLD: 38 %
OVALOCYTES BLD QL SMEAR: ABNORMAL
PATH INTERP BLD-IMP: YES
PHOSPHATE SERPL-MCNC: 3.9 MG/DL (ref 2.5–4.9)
PLATELET # BLD AUTO: 56 K/UL (ref 135–450)
PLATELET BLD QL SMEAR: ABNORMAL
PMV BLD AUTO: 9.4 FL (ref 5–10.5)
POIKILOCYTOSIS BLD QL SMEAR: ABNORMAL
POLYCHROMASIA BLD QL SMEAR: ABNORMAL
POTASSIUM SERPL-SCNC: 3.9 MMOL/L (ref 3.5–5.1)
POTASSIUM SERPL-SCNC: 4 MMOL/L (ref 3.5–5.1)
PROT SERPL-MCNC: 5.9 G/DL (ref 6.4–8.2)
RBC # BLD AUTO: 2.67 M/UL (ref 4.2–5.9)
SCHISTOCYTES BLD QL SMEAR: ABNORMAL
SLIDE REVIEW: ABNORMAL
SMUDGE CELLS BLD QL SMEAR: PRESENT
SODIUM SERPL-SCNC: 137 MMOL/L (ref 136–145)
SODIUM SERPL-SCNC: 138 MMOL/L (ref 136–145)
URATE SERPL-MCNC: 4.3 MG/DL (ref 3.5–7.2)
URATE SERPL-MCNC: 4.4 MG/DL (ref 3.5–7.2)
WBC # BLD AUTO: 1 K/UL (ref 4–11)

## 2025-01-21 PROCEDURE — 86850 RBC ANTIBODY SCREEN: CPT

## 2025-01-21 PROCEDURE — 84550 ASSAY OF BLOOD/URIC ACID: CPT

## 2025-01-21 PROCEDURE — 6370000000 HC RX 637 (ALT 250 FOR IP)

## 2025-01-21 PROCEDURE — 99285 EMERGENCY DEPT VISIT HI MDM: CPT

## 2025-01-21 PROCEDURE — 83735 ASSAY OF MAGNESIUM: CPT

## 2025-01-21 PROCEDURE — 93005 ELECTROCARDIOGRAM TRACING: CPT

## 2025-01-21 PROCEDURE — 85384 FIBRINOGEN ACTIVITY: CPT

## 2025-01-21 PROCEDURE — 2060000000 HC ICU INTERMEDIATE R&B

## 2025-01-21 PROCEDURE — 86901 BLOOD TYPING SEROLOGIC RH(D): CPT

## 2025-01-21 PROCEDURE — 80048 BASIC METABOLIC PNL TOTAL CA: CPT

## 2025-01-21 PROCEDURE — 86900 BLOOD TYPING SEROLOGIC ABO: CPT

## 2025-01-21 PROCEDURE — 85730 THROMBOPLASTIN TIME PARTIAL: CPT

## 2025-01-21 PROCEDURE — 85379 FIBRIN DEGRADATION QUANT: CPT

## 2025-01-21 PROCEDURE — 85610 PROTHROMBIN TIME: CPT

## 2025-01-21 PROCEDURE — 6370000000 HC RX 637 (ALT 250 FOR IP): Performed by: STUDENT IN AN ORGANIZED HEALTH CARE EDUCATION/TRAINING PROGRAM

## 2025-01-21 PROCEDURE — 2580000003 HC RX 258

## 2025-01-21 PROCEDURE — 71046 X-RAY EXAM CHEST 2 VIEWS: CPT

## 2025-01-21 PROCEDURE — P9036 PLATELET PHERESIS IRRADIATED: HCPCS

## 2025-01-21 PROCEDURE — 85025 COMPLETE CBC W/AUTO DIFF WBC: CPT

## 2025-01-21 PROCEDURE — 36415 COLL VENOUS BLD VENIPUNCTURE: CPT

## 2025-01-21 PROCEDURE — 83615 LACTATE (LD) (LDH) ENZYME: CPT

## 2025-01-21 PROCEDURE — 84100 ASSAY OF PHOSPHORUS: CPT

## 2025-01-21 PROCEDURE — 80076 HEPATIC FUNCTION PANEL: CPT

## 2025-01-21 RX ORDER — SODIUM CHLORIDE 9 MG/ML
INJECTION, SOLUTION INTRAVENOUS PRN
Status: DISCONTINUED | OUTPATIENT
Start: 2025-01-21 | End: 2025-02-13 | Stop reason: HOSPADM

## 2025-01-21 RX ORDER — QUETIAPINE FUMARATE 25 MG/1
50 TABLET, FILM COATED ORAL NIGHTLY
Status: DISCONTINUED | OUTPATIENT
Start: 2025-01-21 | End: 2025-01-28

## 2025-01-21 RX ORDER — ALLOPURINOL 300 MG/1
300 TABLET ORAL DAILY
Status: DISCONTINUED | OUTPATIENT
Start: 2025-01-22 | End: 2025-01-29

## 2025-01-21 RX ORDER — SODIUM CHLORIDE 0.9 % (FLUSH) 0.9 %
5-40 SYRINGE (ML) INJECTION EVERY 12 HOURS SCHEDULED
Status: DISCONTINUED | OUTPATIENT
Start: 2025-01-21 | End: 2025-02-13 | Stop reason: HOSPADM

## 2025-01-21 RX ORDER — TRETINOIN 10 MG/1
22.5 CAPSULE ORAL
Status: DISCONTINUED | OUTPATIENT
Start: 2025-01-21 | End: 2025-02-13

## 2025-01-21 RX ORDER — TROSPIUM CHLORIDE 20 MG/1
20 TABLET, FILM COATED ORAL
Status: DISCONTINUED | OUTPATIENT
Start: 2025-01-22 | End: 2025-02-04

## 2025-01-21 RX ORDER — FESOTERODINE FUMARATE 8 MG/1
1 TABLET, FILM COATED, EXTENDED RELEASE ORAL DAILY
COMMUNITY
Start: 2024-10-28

## 2025-01-21 RX ORDER — LEVOFLOXACIN 500 MG/1
500 TABLET, FILM COATED ORAL DAILY
Status: DISCONTINUED | OUTPATIENT
Start: 2025-01-22 | End: 2025-02-06

## 2025-01-21 RX ORDER — SODIUM CHLORIDE 9 MG/ML
INJECTION, SOLUTION INTRAVENOUS CONTINUOUS PRN
Status: DISCONTINUED | OUTPATIENT
Start: 2025-01-21 | End: 2025-02-13 | Stop reason: HOSPADM

## 2025-01-21 RX ORDER — MAGNESIUM SULFATE IN WATER 40 MG/ML
4000 INJECTION, SOLUTION INTRAVENOUS PRN
Status: DISCONTINUED | OUTPATIENT
Start: 2025-01-21 | End: 2025-01-22

## 2025-01-21 RX ORDER — TRETINOIN 10 MG/1
22.5 CAPSULE ORAL
Status: DISCONTINUED | OUTPATIENT
Start: 2025-01-22 | End: 2025-02-13

## 2025-01-21 RX ORDER — SODIUM CHLORIDE 9 MG/ML
INJECTION, SOLUTION INTRAVENOUS CONTINUOUS
Status: DISCONTINUED | OUTPATIENT
Start: 2025-01-21 | End: 2025-01-31

## 2025-01-21 RX ORDER — DONEPEZIL HYDROCHLORIDE 10 MG/1
10 TABLET, FILM COATED ORAL DAILY
Status: DISCONTINUED | OUTPATIENT
Start: 2025-01-22 | End: 2025-02-13 | Stop reason: HOSPADM

## 2025-01-21 RX ORDER — ROSUVASTATIN CALCIUM 5 MG/1
5 TABLET, COATED ORAL DAILY
COMMUNITY
Start: 2024-11-01

## 2025-01-21 RX ORDER — MIRABEGRON 50 MG/1
50 TABLET, FILM COATED, EXTENDED RELEASE ORAL DAILY
COMMUNITY

## 2025-01-21 RX ORDER — VALACYCLOVIR HYDROCHLORIDE 500 MG/1
500 TABLET, FILM COATED ORAL 2 TIMES DAILY
Status: DISCONTINUED | OUTPATIENT
Start: 2025-01-21 | End: 2025-02-06

## 2025-01-21 RX ORDER — QUETIAPINE FUMARATE 50 MG/1
50 TABLET, FILM COATED ORAL NIGHTLY
Status: ON HOLD | COMMUNITY
Start: 2025-01-09 | End: 2025-02-13 | Stop reason: HOSPADM

## 2025-01-21 RX ORDER — FLUCONAZOLE 200 MG/1
200 TABLET ORAL DAILY
Status: DISCONTINUED | OUTPATIENT
Start: 2025-01-22 | End: 2025-02-06

## 2025-01-21 RX ORDER — SODIUM CHLORIDE 0.9 % (FLUSH) 0.9 %
5-40 SYRINGE (ML) INJECTION PRN
Status: DISCONTINUED | OUTPATIENT
Start: 2025-01-21 | End: 2025-02-13 | Stop reason: HOSPADM

## 2025-01-21 RX ORDER — AMOXICILLIN AND CLAVULANATE POTASSIUM 250; 125 MG/1; MG/1
1 TABLET, FILM COATED ORAL EVERY 12 HOURS
Status: DISCONTINUED | OUTPATIENT
Start: 2025-01-21 | End: 2025-01-21

## 2025-01-21 RX ORDER — CETIRIZINE HYDROCHLORIDE 10 MG/1
10 TABLET ORAL NIGHTLY
Status: DISCONTINUED | OUTPATIENT
Start: 2025-01-21 | End: 2025-02-13 | Stop reason: HOSPADM

## 2025-01-21 RX ORDER — TRAMADOL HYDROCHLORIDE 50 MG/1
50 TABLET ORAL EVERY 4 HOURS PRN
Status: DISCONTINUED | OUTPATIENT
Start: 2025-01-21 | End: 2025-02-13 | Stop reason: HOSPADM

## 2025-01-21 RX ORDER — LEVOTHYROXINE SODIUM 150 UG/1
300 TABLET ORAL DAILY
Status: DISCONTINUED | OUTPATIENT
Start: 2025-01-22 | End: 2025-01-21

## 2025-01-21 RX ORDER — AMOXICILLIN AND CLAVULANATE POTASSIUM 250; 125 MG/1; MG/1
1 TABLET, FILM COATED ORAL EVERY 12 HOURS
Status: ON HOLD | COMMUNITY
Start: 2025-01-14 | End: 2025-02-13 | Stop reason: HOSPADM

## 2025-01-21 RX ORDER — LIDOCAINE HYDROCHLORIDE 10 MG/ML
50 INJECTION, SOLUTION EPIDURAL; INFILTRATION; INTRACAUDAL; PERINEURAL ONCE
Status: COMPLETED | OUTPATIENT
Start: 2025-01-21 | End: 2025-01-22

## 2025-01-21 RX ORDER — DONEPEZIL HYDROCHLORIDE 10 MG/1
10 TABLET, FILM COATED ORAL DAILY
COMMUNITY
Start: 2024-07-22

## 2025-01-21 RX ADMIN — VALACYCLOVIR HYDROCHLORIDE 500 MG: 500 TABLET, FILM COATED ORAL at 21:37

## 2025-01-21 RX ADMIN — QUETIAPINE FUMARATE 50 MG: 25 TABLET ORAL at 21:37

## 2025-01-21 RX ADMIN — TRETINOIN 50 MG: 10 CAPSULE ORAL at 23:33

## 2025-01-21 RX ADMIN — SODIUM CHLORIDE: 9 INJECTION, SOLUTION INTRAVENOUS at 21:43

## 2025-01-21 RX ADMIN — CETIRIZINE HYDROCHLORIDE 10 MG: 10 TABLET, FILM COATED ORAL at 21:37

## 2025-01-21 ASSESSMENT — PAIN - FUNCTIONAL ASSESSMENT: PAIN_FUNCTIONAL_ASSESSMENT: NONE - DENIES PAIN

## 2025-01-21 NOTE — ED NOTES
Patient Name: Karan Fortune  : 1950 74 y.o.  MRN: [id number]  ED Room #: A09/A09-09     Chief complaint:   Chief Complaint   Patient presents with    Abnormal Test Results     Pt dx with leukemia at appointment today, was sent to ed for admission/treatment. Pt denies any symptoms/complaints     Hospital Problem/Diagnosis:       O2 Flow Rate:O2 Device: None (Room air)   (if applicable)  Cardiac Rhythm:   (if applicable)  Active LDA's:           How does patient ambulate? Low Fall Risk (Ambulates by themselves without support    2. How does patient take pills? Unknown, no oral medications were given in the Emergency Department    3. Is patient alert? Alert    4. Is patient oriented? To Person, To Place, To Time, To Situation, and Follows Commands    5.   Patient arrived from:  home  Facility Name: ___________________________________________    6. If patient is disoriented or from a Skill Nursing Facility has family been notified of admission? No    7. Patient belongings? Belongings: Cell Phone, Wallet, and Clothing    Disposition of belongings? Kept with Patient     8. Any specific patient or family belongings/needs/dynamics?   a. Recently dx w/ acute leukemia today. Family is a bit in shock and feeling overwhelmed at times.     9. Miscellaneous comments/pending orders?  a. No current orders at this time. Pt feels asymptomatic. CBC labs are critical.      If there are any additional questions please reach out to the Emergency Department.      Xin Rey RN  [phone]

## 2025-01-21 NOTE — ED PROVIDER NOTES
THE [facility]  EMERGENCY DEPARTMENT ENCOUNTER          ATTENDING PHYSICIAN NOTE       Date of evaluation: 1/21/2025    Chief Complaint     Abnormal Test Results (Pt dx with leukemia at appointment today, was sent to ed for admission/treatment. Pt denies any symptoms/complaints)      History of Present Illness     Karan Fortune is a 74 y.o. male who presents to the emergency department, at the behest of an oncologist in the [facility] system, to be admitted to the [facility] for initiation of therapy for new diagnosis of acute leukemia.  Patient states that he had been in his baseline level of good health until a couple of weeks ago, when he noted what was felt to be a rash on his bilateral lower legs, and an episode of epistaxis.  His wife communicated these symptoms to his primary care provider, who ordered some outpatient labs, and he was found in the first week of January to have pancytopenia, with a white blood cell count of 2.8, hemoglobin of 11.2, and platelet count of 26,000.  He was referred urgently to the [facility] hematology clinic, and was seen on January 9, where his rash was noted to be consistent with petechiae, and his presentation was concerning for an underlying marrow disorder.  He was scheduled for a bone marrow biopsy, which was completed this past Friday, January 17.  The patient's wife was called today by the [facility] hematology office, stating that the preliminary bone marrow biopsy results had been sent to the [facility] hematologist, and were concerning for acute leukemia.  She was told that the [facility] oncologist, Dr. De La Cruz, had discussed these bone marrow biopsy findings with the [facility] oncologist on-call, Dr. Lundberg, and was advised that the patient would need to come to be admitted to [facility] to initiate therapy.    Patient states that he is overall feeling well.  The rash on his lower legs has resolved.  He has not noted any particular chest pain or

## 2025-01-22 ENCOUNTER — APPOINTMENT (OUTPATIENT)
Age: 75
DRG: 835 | End: 2025-01-22
Payer: MEDICARE

## 2025-01-22 LAB
ABO + RH BLD: NORMAL
ALBUMIN SERPL-MCNC: 4 G/DL (ref 3.4–5)
ALP SERPL-CCNC: 50 U/L (ref 40–129)
ALT SERPL-CCNC: 6 U/L (ref 10–40)
ANION GAP SERPL CALCULATED.3IONS-SCNC: 8 MMOL/L (ref 3–16)
APTT BLD: 30.3 SEC (ref 22.1–36.4)
APTT BLD: 30.7 SEC (ref 22.1–36.4)
AST SERPL-CCNC: 17 U/L (ref 15–37)
BASOPHILS # BLD: 0 K/UL (ref 0–0.2)
BASOPHILS # BLD: 0 K/UL (ref 0–0.2)
BASOPHILS NFR BLD: 0 %
BASOPHILS NFR BLD: 0 %
BILIRUB DIRECT SERPL-MCNC: 0.3 MG/DL (ref 0–0.3)
BILIRUB INDIRECT SERPL-MCNC: 0.5 MG/DL (ref 0–1)
BILIRUB SERPL-MCNC: 0.8 MG/DL (ref 0–1)
BILIRUB UR QL STRIP.AUTO: NEGATIVE
BLD GP AB SCN SERPL QL: NORMAL
BLOOD BANK DISPENSE STATUS: NORMAL
BLOOD BANK PRODUCT CODE: NORMAL
BPU ID: NORMAL
BUN SERPL-MCNC: 10 MG/DL (ref 7–20)
CALCIUM SERPL-MCNC: 8.8 MG/DL (ref 8.3–10.6)
CHLORIDE SERPL-SCNC: 106 MMOL/L (ref 99–110)
CHOLEST SERPL-MCNC: 197 MG/DL (ref 0–199)
CLARITY UR: CLEAR
CO2 SERPL-SCNC: 26 MMOL/L (ref 21–32)
COLOR UR: YELLOW
CREAT SERPL-MCNC: 0.7 MG/DL (ref 0.8–1.3)
D-DIMER QUANTITATIVE: 11.89 UG/ML FEU (ref 0–0.6)
DEPRECATED RDW RBC AUTO: 15.5 % (ref 12.4–15.4)
DEPRECATED RDW RBC AUTO: 15.5 % (ref 12.4–15.4)
DESCRIPTION BLOOD BANK: NORMAL
ECHO BSA: 2.1 M2
ECHO LV EDV A2C: 66 ML
ECHO LV EDV A4C: 98 ML
ECHO LV EDV INDEX A4C: 50 ML/M2
ECHO LV EDV NDEX A2C: 34 ML/M2
ECHO LV EJECTION FRACTION A2C: 50 %
ECHO LV EJECTION FRACTION A4C: 63 %
ECHO LV EJECTION FRACTION BIPLANE: 57 % (ref 55–100)
ECHO LV ESV A2C: 33 ML
ECHO LV ESV A4C: 36 ML
ECHO LV ESV INDEX A2C: 17 ML/M2
ECHO LV ESV INDEX A4C: 18 ML/M2
ECHO LV FRACTIONAL SHORTENING: 33 % (ref 28–44)
ECHO LV INTERNAL DIMENSION DIASTOLE INDEX: 2.89 CM/M2
ECHO LV INTERNAL DIMENSION DIASTOLIC: 5.7 CM (ref 4.2–5.9)
ECHO LV INTERNAL DIMENSION SYSTOLIC INDEX: 1.93 CM/M2
ECHO LV INTERNAL DIMENSION SYSTOLIC: 3.8 CM
ECHO LV IVSD: 1 CM (ref 0.6–1)
ECHO LV MASS 2D: 226.4 G (ref 88–224)
ECHO LV MASS INDEX 2D: 114.9 G/M2 (ref 49–115)
ECHO LV POSTERIOR WALL DIASTOLIC: 1 CM (ref 0.6–1)
ECHO LV RELATIVE WALL THICKNESS RATIO: 0.35
ECHO RV TAPSE: 2.5 CM (ref 1.7–?)
EKG ATRIAL RATE: 77 BPM
EKG DIAGNOSIS: NORMAL
EKG P AXIS: -13 DEGREES
EKG P-R INTERVAL: 116 MS
EKG Q-T INTERVAL: 400 MS
EKG QRS DURATION: 86 MS
EKG QTC CALCULATION (BAZETT): 452 MS
EKG R AXIS: 2 DEGREES
EKG T AXIS: 25 DEGREES
EKG VENTRICULAR RATE: 77 BPM
EOSINOPHIL # BLD: 0 K/UL (ref 0–0.6)
EOSINOPHIL # BLD: 0 K/UL (ref 0–0.6)
EOSINOPHIL NFR BLD: 0 %
EOSINOPHIL NFR BLD: 2 %
FIBRINOGEN PPP-MCNC: 150 MG/DL (ref 227–534)
FIBRINOGEN PPP-MCNC: 166 MG/DL (ref 227–534)
GFR SERPLBLD CREATININE-BSD FMLA CKD-EPI: >90 ML/MIN/{1.73_M2}
GLUCOSE SERPL-MCNC: 98 MG/DL (ref 70–99)
GLUCOSE UR STRIP.AUTO-MCNC: NEGATIVE MG/DL
HCT VFR BLD AUTO: 25.1 % (ref 40.5–52.5)
HCT VFR BLD AUTO: 26.5 % (ref 40.5–52.5)
HGB BLD-MCNC: 8.6 G/DL (ref 13.5–17.5)
HGB BLD-MCNC: 9.2 G/DL (ref 13.5–17.5)
HGB UR QL STRIP.AUTO: ABNORMAL
INR PPP: 1.13 (ref 0.85–1.15)
INR PPP: 1.15 (ref 0.85–1.15)
KETONES UR STRIP.AUTO-MCNC: NEGATIVE MG/DL
LDH SERPL L TO P-CCNC: 203 U/L (ref 100–190)
LEUKOCYTE ESTERASE UR QL STRIP.AUTO: NEGATIVE
LYMPHOCYTES # BLD: 0.6 K/UL (ref 1–5.1)
LYMPHOCYTES # BLD: 0.6 K/UL (ref 1–5.1)
LYMPHOCYTES NFR BLD: 70 %
LYMPHOCYTES NFR BLD: 80 %
MACROCYTES BLD QL SMEAR: ABNORMAL
MAGNESIUM SERPL-MCNC: 2.17 MG/DL (ref 1.8–2.4)
MCH RBC QN AUTO: 35 PG (ref 26–34)
MCH RBC QN AUTO: 35.6 PG (ref 26–34)
MCHC RBC AUTO-ENTMCNC: 34.2 G/DL (ref 31–36)
MCHC RBC AUTO-ENTMCNC: 34.6 G/DL (ref 31–36)
MCV RBC AUTO: 102.4 FL (ref 80–100)
MCV RBC AUTO: 102.6 FL (ref 80–100)
METAMYELOCYTES NFR BLD MANUAL: 2 %
METAMYELOCYTES NFR BLD MANUAL: 2 %
MONOCYTES # BLD: 0 K/UL (ref 0–1.3)
MONOCYTES # BLD: 0 K/UL (ref 0–1.3)
MONOCYTES NFR BLD: 0 %
MONOCYTES NFR BLD: 0 %
NEUTROPHILS # BLD: 0.2 K/UL (ref 1.7–7.7)
NEUTROPHILS # BLD: 0.2 K/UL (ref 1.7–7.7)
NEUTROPHILS NFR BLD: 16 %
NEUTROPHILS NFR BLD: 26 %
NEUTS BAND NFR BLD MANUAL: 2 % (ref 0–7)
NITRITE UR QL STRIP.AUTO: NEGATIVE
PATH INTERP BLD-IMP: NO
PATH INTERP BLD-IMP: NO
PATH INTERP BLD-IMP: NORMAL
PH UR STRIP.AUTO: 6 [PH] (ref 5–8)
PHOSPHATE SERPL-MCNC: 3.6 MG/DL (ref 2.5–4.9)
PLATELET # BLD AUTO: 41 K/UL (ref 135–450)
PLATELET # BLD AUTO: 46 K/UL (ref 135–450)
PLATELET BLD QL SMEAR: ABNORMAL
PLATELET BLD QL SMEAR: ABNORMAL
PMV BLD AUTO: 7.8 FL (ref 5–10.5)
PMV BLD AUTO: 9 FL (ref 5–10.5)
POTASSIUM SERPL-SCNC: 4.4 MMOL/L (ref 3.5–5.1)
PROT SERPL-MCNC: 6.2 G/DL (ref 6.4–8.2)
PROT UR STRIP.AUTO-MCNC: NEGATIVE MG/DL
PROTHROMBIN TIME: 14.7 SEC (ref 11.9–14.9)
PROTHROMBIN TIME: 14.9 SEC (ref 11.9–14.9)
RBC # BLD AUTO: 2.45 M/UL (ref 4.2–5.9)
RBC # BLD AUTO: 2.58 M/UL (ref 4.2–5.9)
RBC #/AREA URNS HPF: ABNORMAL /HPF (ref 0–4)
SLIDE REVIEW: ABNORMAL
SLIDE REVIEW: ABNORMAL
SMUDGE CELLS BLD QL SMEAR: PRESENT
SMUDGE CELLS BLD QL SMEAR: PRESENT
SODIUM SERPL-SCNC: 140 MMOL/L (ref 136–145)
SP GR UR STRIP.AUTO: 1.02 (ref 1–1.03)
TRIGL SERPL-MCNC: 260 MG/DL (ref 0–150)
UA DIPSTICK W REFLEX MICRO PNL UR: YES
URATE SERPL-MCNC: 4.3 MG/DL (ref 3.5–7.2)
URN SPEC COLLECT METH UR: ABNORMAL
UROBILINOGEN UR STRIP-ACNC: 0.2 E.U./DL
WBC # BLD AUTO: 0.8 K/UL (ref 4–11)
WBC # BLD AUTO: 0.8 K/UL (ref 4–11)
WBC #/AREA URNS HPF: ABNORMAL /HPF (ref 0–5)

## 2025-01-22 PROCEDURE — 2500000003 HC RX 250 WO HCPCS

## 2025-01-22 PROCEDURE — 6360000002 HC RX W HCPCS

## 2025-01-22 PROCEDURE — 85610 PROTHROMBIN TIME: CPT

## 2025-01-22 PROCEDURE — 85025 COMPLETE CBC W/AUTO DIFF WBC: CPT

## 2025-01-22 PROCEDURE — 93321 DOPPLER ECHO F-UP/LMTD STD: CPT | Performed by: INTERNAL MEDICINE

## 2025-01-22 PROCEDURE — 85379 FIBRIN DEGRADATION QUANT: CPT

## 2025-01-22 PROCEDURE — 36592 COLLECT BLOOD FROM PICC: CPT

## 2025-01-22 PROCEDURE — 36430 TRANSFUSION BLD/BLD COMPNT: CPT

## 2025-01-22 PROCEDURE — 80076 HEPATIC FUNCTION PANEL: CPT

## 2025-01-22 PROCEDURE — 83735 ASSAY OF MAGNESIUM: CPT

## 2025-01-22 PROCEDURE — 83615 LACTATE (LD) (LDH) ENZYME: CPT

## 2025-01-22 PROCEDURE — 93325 DOPPLER ECHO COLOR FLOW MAPG: CPT | Performed by: INTERNAL MEDICINE

## 2025-01-22 PROCEDURE — 85730 THROMBOPLASTIN TIME PARTIAL: CPT

## 2025-01-22 PROCEDURE — 6370000000 HC RX 637 (ALT 250 FOR IP): Performed by: STUDENT IN AN ORGANIZED HEALTH CARE EDUCATION/TRAINING PROGRAM

## 2025-01-22 PROCEDURE — 93010 ELECTROCARDIOGRAM REPORT: CPT | Performed by: INTERNAL MEDICINE

## 2025-01-22 PROCEDURE — 2580000003 HC RX 258

## 2025-01-22 PROCEDURE — 36415 COLL VENOUS BLD VENIPUNCTURE: CPT

## 2025-01-22 PROCEDURE — 84478 ASSAY OF TRIGLYCERIDES: CPT

## 2025-01-22 PROCEDURE — 84100 ASSAY OF PHOSPHORUS: CPT

## 2025-01-22 PROCEDURE — 2580000003 HC RX 258: Performed by: INTERNAL MEDICINE

## 2025-01-22 PROCEDURE — 82465 ASSAY BLD/SERUM CHOLESTEROL: CPT

## 2025-01-22 PROCEDURE — 80048 BASIC METABOLIC PNL TOTAL CA: CPT

## 2025-01-22 PROCEDURE — 85384 FIBRINOGEN ACTIVITY: CPT

## 2025-01-22 PROCEDURE — 93356 MYOCRD STRAIN IMG SPCKL TRCK: CPT | Performed by: INTERNAL MEDICINE

## 2025-01-22 PROCEDURE — 36569 INSJ PICC 5 YR+ W/O IMAGING: CPT

## 2025-01-22 PROCEDURE — 81001 URINALYSIS AUTO W/SCOPE: CPT

## 2025-01-22 PROCEDURE — 6360000002 HC RX W HCPCS: Performed by: INTERNAL MEDICINE

## 2025-01-22 PROCEDURE — 84550 ASSAY OF BLOOD/URIC ACID: CPT

## 2025-01-22 PROCEDURE — C1751 CATH, INF, PER/CENT/MIDLINE: HCPCS

## 2025-01-22 PROCEDURE — 6370000000 HC RX 637 (ALT 250 FOR IP)

## 2025-01-22 PROCEDURE — 93321 DOPPLER ECHO F-UP/LMTD STD: CPT

## 2025-01-22 PROCEDURE — 93308 TTE F-UP OR LMTD: CPT | Performed by: INTERNAL MEDICINE

## 2025-01-22 PROCEDURE — 2060000000 HC ICU INTERMEDIATE R&B

## 2025-01-22 RX ORDER — SODIUM CHLORIDE 9 MG/ML
INJECTION, SOLUTION INTRAVENOUS PRN
Status: DISCONTINUED | OUTPATIENT
Start: 2025-01-22 | End: 2025-02-13 | Stop reason: HOSPADM

## 2025-01-22 RX ORDER — PROCHLORPERAZINE MALEATE 10 MG
5 TABLET ORAL EVERY 4 HOURS PRN
Status: DISCONTINUED | OUTPATIENT
Start: 2025-01-22 | End: 2025-02-13 | Stop reason: HOSPADM

## 2025-01-22 RX ORDER — ACETAMINOPHEN 325 MG/1
650 TABLET ORAL ONCE
Status: DISCONTINUED | OUTPATIENT
Start: 2025-01-22 | End: 2025-01-28

## 2025-01-22 RX ORDER — FUROSEMIDE 10 MG/ML
40 INJECTION INTRAMUSCULAR; INTRAVENOUS EVERY 12 HOURS
Status: DISCONTINUED | OUTPATIENT
Start: 2025-01-23 | End: 2025-01-30

## 2025-01-22 RX ORDER — POTASSIUM CHLORIDE 29.8 MG/ML
20 INJECTION INTRAVENOUS PRN
Status: DISCONTINUED | OUTPATIENT
Start: 2025-01-22 | End: 2025-02-13 | Stop reason: HOSPADM

## 2025-01-22 RX ORDER — MAGNESIUM SULFATE IN WATER 40 MG/ML
4000 INJECTION, SOLUTION INTRAVENOUS PRN
Status: DISCONTINUED | OUTPATIENT
Start: 2025-01-22 | End: 2025-02-13 | Stop reason: HOSPADM

## 2025-01-22 RX ORDER — PROCHLORPERAZINE EDISYLATE 5 MG/ML
5 INJECTION INTRAMUSCULAR; INTRAVENOUS EVERY 4 HOURS PRN
Status: DISCONTINUED | OUTPATIENT
Start: 2025-01-22 | End: 2025-02-13 | Stop reason: HOSPADM

## 2025-01-22 RX ADMIN — LIDOCAINE HYDROCHLORIDE ANHYDROUS 50 MG: 10 INJECTION, SOLUTION INFILTRATION at 09:18

## 2025-01-22 RX ADMIN — TRETINOIN 50 MG: 10 CAPSULE ORAL at 16:32

## 2025-01-22 RX ADMIN — SODIUM CHLORIDE: 9 INJECTION, SOLUTION INTRAVENOUS at 20:55

## 2025-01-22 RX ADMIN — TROSPIUM CHLORIDE 20 MG: 20 TABLET, FILM COATED ORAL at 16:32

## 2025-01-22 RX ADMIN — TRAMADOL HYDROCHLORIDE 50 MG: 50 TABLET, COATED ORAL at 16:32

## 2025-01-22 RX ADMIN — ARSENIC TRIOXIDE 13 MG: 1 INJECTION, SOLUTION INTRAVENOUS at 13:50

## 2025-01-22 RX ADMIN — VALACYCLOVIR HYDROCHLORIDE 500 MG: 500 TABLET, FILM COATED ORAL at 08:24

## 2025-01-22 RX ADMIN — LEVOTHYROXINE SODIUM 175 MCG: 150 TABLET ORAL at 06:50

## 2025-01-22 RX ADMIN — FLUCONAZOLE 200 MG: 200 TABLET ORAL at 08:24

## 2025-01-22 RX ADMIN — PROCHLORPERAZINE EDISYLATE 5 MG: 5 INJECTION INTRAMUSCULAR; INTRAVENOUS at 23:27

## 2025-01-22 RX ADMIN — VALACYCLOVIR HYDROCHLORIDE 500 MG: 500 TABLET, FILM COATED ORAL at 21:37

## 2025-01-22 RX ADMIN — ALLOPURINOL 300 MG: 300 TABLET ORAL at 08:24

## 2025-01-22 RX ADMIN — CETIRIZINE HYDROCHLORIDE 10 MG: 10 TABLET, FILM COATED ORAL at 21:37

## 2025-01-22 RX ADMIN — QUETIAPINE FUMARATE 50 MG: 25 TABLET ORAL at 21:37

## 2025-01-22 RX ADMIN — DONEPEZIL HYDROCHLORIDE 10 MG: 10 TABLET ORAL at 08:24

## 2025-01-22 RX ADMIN — TRETINOIN 50 MG: 10 CAPSULE ORAL at 08:21

## 2025-01-22 RX ADMIN — SODIUM CHLORIDE: 9 INJECTION, SOLUTION INTRAVENOUS at 07:40

## 2025-01-22 RX ADMIN — TROSPIUM CHLORIDE 20 MG: 20 TABLET, FILM COATED ORAL at 06:50

## 2025-01-22 RX ADMIN — SODIUM CHLORIDE, PRESERVATIVE FREE 10 ML: 5 INJECTION INTRAVENOUS at 08:29

## 2025-01-22 RX ADMIN — LEVOFLOXACIN 500 MG: 500 TABLET, FILM COATED ORAL at 08:24

## 2025-01-22 RX ADMIN — TRAMADOL HYDROCHLORIDE 50 MG: 50 TABLET, COATED ORAL at 23:26

## 2025-01-22 ASSESSMENT — PAIN DESCRIPTION - LOCATION: LOCATION: HEAD

## 2025-01-22 ASSESSMENT — PAIN - FUNCTIONAL ASSESSMENT: PAIN_FUNCTIONAL_ASSESSMENT: ACTIVITIES ARE NOT PREVENTED

## 2025-01-22 ASSESSMENT — PAIN SCALES - WONG BAKER: WONGBAKER_NUMERICALRESPONSE: NO HURT

## 2025-01-22 ASSESSMENT — PAIN SCALES - GENERAL
PAINLEVEL_OUTOF10: 5
PAINLEVEL_OUTOF10: 7

## 2025-01-22 ASSESSMENT — PAIN DESCRIPTION - DESCRIPTORS: DESCRIPTORS: ACHING

## 2025-01-22 NOTE — PROGRESS NOTES
Verification:  Original chemotherapy orders reviewed and acknowledged. Appropriateness of chemotherapy treatment regimen arsenic trioxide for diagnosis of acute promyelocytic leukemia was verified.  Patient educated on chemotherapy regimen.  Acknowledgement of informed consent for chemotherapy obtained.      Estimated body surface area is 2.02 meters squared as calculated from the following:    Height as of this encounter: 1.658 m (5' 5.28\").    Weight as of this encounter: 88.8 kg (195 lb 12.8 oz). verified.  Appropriate dosing calculations of chemotherapy based on above height, weight, and BSA verified.        Administration: Chemotherapy drug arsenic trioxide independently verified with Gerald Canela RN prior to administration.  Acknowledgement of informed consent for chemotherapy administration verified.  Original order, appropriateness of regimen, drug supplied, height, weight, BSA, dose calculations, expiration dates/times, drug appearance, and two patient identifiers were verified by both RNs.  Drug checked for vesicant/irritant status and for risk of hypersensitivity.  Most recent laboratory values and allergies, were reviewed.  Positive, brisk blood return via CVC was confirmed prior to administration. Chest x-ray for correct line placement reviewed. Karla Coulter, RN, RN and Gerald Prescott RN verified correct rate of chemotherapy and maintenance IV fluids.  Patient was educated on chemotherapy regimen prior to administration including indication for treatment related to disease & side effects of chemotherapy drug.  Patient verbalizes understanding of all instructions.      Completion of Chemotherapy: Monitoring during infusion done per policy, see Flowsheets.  Blood return verified before, during, and after infusion per policy; no signs of extravasation.  Pt tolerating chemotherapy well and without incident.  Chemotherapy infusion end time on the MAR.

## 2025-01-22 NOTE — H&P
[facility] Progress Note    2025     Karan Fortune    MRN: [id number]    : 1950    Referring MD: No referring provider defined for this encounter.      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  He has baseline dementia.  Discussed the situation with him.  He appears to have an understanding of the situation.  Plan to start ATRA and arsenic today.  He not high risk.    ECOG PS:  (2) Ambulatory and capable of self care, unable to carry out work activity, up and about > 50% or waking hours    KPS: 70% Cares for self; unable to carry on normal activity or to do active work    Isolation: None    Medications    Scheduled Meds:   trospium  20 mg Oral BID AC    QUEtiapine  50 mg Oral Nightly    cetirizine  10 mg Oral Nightly    donepezil  10 mg Oral Daily    sodium chloride flush  5-40 mL IntraVENous 2 times per day    Saline Mouthwash  15 mL Swish & Spit 4x Daily AC & HS    valACYclovir  500 mg Oral BID    fluconazole  200 mg Oral Daily    levoFLOXacin  500 mg Oral Daily    allopurinol  300 mg Oral Daily    sodium chloride flush  5-40 mL IntraVENous 2 times per day    lidocaine 1 % injection  50 mg IntraDERmal Once    tretinoin  22.5 mg/m2 Oral Daily with breakfast    And    tretinoin  22.5 mg/m2 Oral Dinner    levothyroxine  175 mcg Oral Daily     Continuous Infusions:   sodium chloride      sodium chloride      sodium chloride 100 mL/hr at 25 2143    sodium chloride       PRN Meds:.traMADol, sodium chloride, sodium chloride flush, sodium chloride, magnesium sulfate, ALTEplase (CATHFLO) 2 mg in sterile water 2 mL injection, sodium chloride flush, sodium chloride    ROS:  As noted above, otherwise remainder of 10-point ROS negative    Physical Exam:     I&O:    Intake/Output Summary (Last 24 hours) at 2025 0728  Last data filed at 2025 0653  Gross per 24 hour   Intake 915 ml   Output 1300 ml   Net -385 ml       Vital Signs:  BP (!) 145/75   Pulse 99   Temp 97.8 °F (36.6 °C) (Oral)   suggestive of acute promyelocytic leukemia (APL).  FISH study for AML panel detected PML/CODIE translocation in 121 / 200 cells,   consistent with APL with PML::RATA fusion.  On the bone marrow aspirate smears, the leukemic cells/abnormal granulocytes account for approximately 75% of total cells.      PLAN:    -ATRA  (started 1/21/25) DAY 2  -Arsenic (started 1/22/25) DAY 1  -ECHO and PICC in am        2. ID:    - Valtrex, Levaquin and fluconazole ppx        3. Heme:    - Transfuse for Hgb < 7 and Platelets < 50K (epistaxis and high risk to bleed)   - No transfusion today  Risk for DIC:  - Q12 coags and fibrinogen   - Transfuse Cryoprecipitate if Fibrinogen is <150     4. Metabolic:    -Allopurinol 300 mg PO QD  - NS at 100 mL/h   - Replace K+ & Mg per PRN orders.     5. GI / Nutrition:    - low microbial diet   - Dietician to follow     6. Cardiac:   -ECHO 9/5/24: LVEF 70%, normal systolic function. Mild mitral regurgitation   -EKG: SR 77 bpm QTc 452 ms  -ECHO pending      7. Pulm:   - Encourage IS & ambulation     8. Neuro:  History of Lewy body dementia diagnosed with positive Lizy scan   -7/21/23 lumbar puncture: AD biomarkers in CSF  -Difficulty with word finding, problems with gait, lacks patience   -Follows with Rafael Jason at    -Continue Aricept  -Continue Seroquel   Hypersomnolence   -Hold Adderall 20 mg PO QD   Muscle rigidity/ spasms   -OK for baclofen       9. Endocrine   History of Thyroid Cancer s/p thyroidectomy   Post ablative hypothyroidism   -Continue levothyroxine      10. ENT   Septorhinoplasty in 2023  Allergic rhinitis   -Xyzal not on formulary; started zyrtec     11.    Overactive Bladder  -Continue Trospium         12. Vascular Access   PICC order placed      - DVT Prophylaxis: Platelets <50,000 cells/dL - prophylactic lovenox on hold and mechanical prophylaxis with bilateral SCDs while in bed in place.  Contraindications to pharmacologic prophylaxis:

## 2025-01-22 NOTE — PROCEDURES
PROCEDURE NOTE  Date: 2025   Name: Karan Fortune  YOB: 1950    Procedures                                                                       DOUBLE PICC PROCEDURE NOTE  Chart reviewed for allergies, diagnosis, labs, known contraindications, reason for line placement and planned length of treatment.  Informed consent noted to be signed and on chart.  Insertion procedure discussed with patient/family member.  Three patient identifiers - Patient name,   and MRN -  completed &  confirmed verbally.         Time out performed.  Hat, mask and eye shield donned.  PICC site cleaned with chlorhexidine wipes then scrubbed with Chloraprep one-step applicator for 30 seconds x 1.   Hand Hygiene  performed with 3% Chlorhexidine surgical scrub x1 min prior to  sterile gloves, sterile gown being donned.  Patient draped using maximal sterile barrier technique ( head to toe ).  PICC site scrubbed a 2nd time with Chloraprep One-Step applicator x 30 sec. Modified Seldinger technique/ultrasound assisted and tip locating system utilized for insertion and 1% Lidocaine 5 ml injected intradermal pre-insertion.  PICC tip location in the SVC confirmed by ECG technology.   Positive brisk blood return obtained from all lumens.  Valves placed to all lumens and  flushed with 10 mls 0.9% Sterile Sodium Chloride.  All lumens flush easily with no resistance.  Skin prep applied to site.   Catheter secured with non-sutured locking device per hospital protocol. Bio-patch/CHG impregnated sterile tegaderm dressing applied.  Alcohol Swab Caps applied to each valve.  Sterile field maintained during procedure.  PICC insertion, rhythm and positioning wire (utilized prn) accounted  for post procedure and disposed of in sharps.  Appearance of site is Clean dry and intact without bleeding or edema. All edges of Tegaderm occlusive.   Site marked with date and initials of RN placing line. Teaching performed to pt/family and noted in

## 2025-01-22 NOTE — PLAN OF CARE
Problem: Safety - Adult  Goal: Free from fall injury  Outcome: Progressing  Flowsheets (Taken 1/22/2025 0300)  Free From Fall Injury:   Instruct family/caregiver on patient safety   Based on caregiver fall risk screen, instruct family/caregiver to ask for assistance with transferring infant if caregiver noted to have fall risk factors     Problem: ABCDS Injury Assessment  Goal: Absence of physical injury  Outcome: Progressing  Flowsheets (Taken 1/22/2025 0300)  Absence of Physical Injury: Implement safety measures based on patient assessment

## 2025-01-22 NOTE — H&P
BCC History and Physical           Attending Physician: No att. providers found          Primary Care: Robert Adler MD                                Referring MD: No referring provider defined for this encounter.     Name: Karan Fortune       :  1950  MRN:  [id number]     Admission: 2024       Date: 2025     Reason for Admission: Concern for APL     History of Present Illness:       Karan Fortune is a 74 y.o. male with past medical history of thyroid cancer, followed at , in remission, Lewy body dementia, hypersomnolence, allergic rhinitis and BPH.      He was referred to [facility] hematology department by his PCP for thrombocytopenia and petechiae. He underwent a BMBx on 25. Dr. De La Cruz called FISH results to Dr. Anne Lundberg, which reportedly show APL. He is admitted for further work-up, treatment and supportive care.                    Past Surgical History         Past Surgical History:   Procedure Laterality Date    COLONOSCOPY        (no info)    HEMORRHOID SURGERY       HERNIA REPAIR        SHOULDER ARTHROSCOPY        right    SHOULDER ARTHROSCOPY   12     left    THROAT SURGERY          for thyroid tissue removal    TOTAL THYROIDECTOMY               Past Medical History        Past Medical History:   Diagnosis Date    Thyroid cancer (HCC)             Home Medications           Prior to Admission medications    Medication Sig Start Date End Date Taking? Authorizing Provider   rosuvastatin (CRESTOR) 5 MG tablet Take 1 tablet by mouth daily 24     Claudia Lott MD   MYRBETRIQ 50 MG TB24 Take 50 mg by mouth daily       Claudia Lott MD   Fesoterodine Fumarate ER 8 MG TB24 Take 1 tablet by mouth daily 10/28/24     Claudia Lott MD   donepezil (ARICEPT) 10 MG tablet Take 1 tablet by mouth daily 24     Provider

## 2025-01-22 NOTE — PLAN OF CARE
Problem: Safety - Adult  Goal: Free from fall injury  Outcome: Progressing  Flowsheets (Taken 1/22/2025 0812)  Free From Fall Injury: Instruct family/caregiver on patient safety  Pt is a fall risk. Pt is on bed/chair alarm. Pt has a sitter. Pt is up with assist with gait belt. Pt tolerates ambulating to the bathroom well. Pt bed is in lowest position, wheels locked. Pt call light and table within reach.     Problem: ABCDS Injury Assessment  Goal: Absence of physical injury  Outcome: Progressing  Flowsheets (Taken 1/22/2025 0812)  Absence of Physical Injury: Implement safety measures based on patient assessment   Pt is a fall risk. Standard safety measures in place.     Problem: Neurosensory - Adult  Goal: Achieves stable or improved neurological status  Outcome: Progressing   Pt has Dementia. He is alert to person, place, and situation.     Problem: Respiratory - Adult  Goal: Achieves optimal ventilation and oxygenation  Outcome: Progressing     Problem: Cardiovascular - Adult  Goal: Maintains optimal cardiac output and hemodynamic stability  Outcome: Progressing  Goal: Absence of cardiac dysrhythmias or at baseline  Outcome: Progressing     Problem: Skin/Tissue Integrity - Adult  Goal: Skin integrity remains intact  Outcome: Progressing  Goal: Incisions, wounds, or drain sites healing without S/S of infection  Outcome: Progressing  Pt had a PICC placed today. Pt denies any pain or irritation at site.    Goal: Oral mucous membranes remain intact  Outcome: Progressing   Pt oral toxicity score is an 8. Pt denies any pain.     Problem: Musculoskeletal - Adult  Goal: Return mobility to safest level of function  Outcome: Progressing  Pt has some weakness when ambulating. Pt has to be directed.     Goal: Return ADL status to a safe level of function  Outcome: Progressing     Problem: Gastrointestinal - Adult  Goal: Minimal or absence of nausea and vomiting  Outcome: Progressing  Pt denies and nausea or vomiting.   Goal:  Maintains or returns to baseline bowel function  Outcome: Progressing  Goal: Maintains adequate nutritional intake  Outcome: Progressing     Problem: Genitourinary - Adult  Goal: Absence of urinary retention  Outcome: Progressing   Pt is voiding. Pt denies any pain when urinating. Absence of urinary retention.     Problem: Infection - Adult  Goal: Absence of infection at discharge  Outcome: Progressing  Goal: Absence of infection during hospitalization  Outcome: Progressing  Goal: Absence of fever/infection during anticipated neutropenic period  Outcome: Progressing     Problem: Metabolic/Fluid and Electrolytes - Adult  Goal: Electrolytes maintained within normal limits  Outcome: Progressing  Goal: Hemodynamic stability and optimal renal function maintained  Outcome: Progressing     Problem: Hematologic - Adult  Goal: Maintains hematologic stability  Outcome: Progressing   Pt parameters are 7 and 50. Pt platelets were 41. Pt was given platelets per Dr orders.     Problem: Chronic Conditions and Co-morbidities  Goal: Patient's chronic conditions and co-morbidity symptoms are monitored and maintained or improved  Outcome: Progressing     Problem: Pain  Goal: Verbalizes/displays adequate comfort level or baseline comfort level  Outcome: Progressing   Pt complained of headache. Pt was repositioned. Pt stated headache went away the later on came back. Pt has Tramadol PRN.

## 2025-01-22 NOTE — CONSENT
Informed Consent for Blood Component Transfusion Note    I have discussed with the patient the rationale for blood component transfusion; its benefits in treating or preventing fatigue, organ damage, or death; and its risk which includes mild transfusion reactions, rare risk of blood borne infection, or more serious but rare reactions. I have discussed the alternatives to transfusion, including the risk and consequences of not receiving transfusion. The patient had an opportunity to ask questions and had agreed to proceed with transfusion of blood components.    Electronically signed by LUISITO Marino CNP on 1/22/25 at 10:05 AM EST

## 2025-01-22 NOTE — CARE COORDINATION
Case Management Assessment  Initial Evaluation    Date/Time of Evaluation: 1/22/2025 11:30 AM  Assessment Completed by: PARVIN Aguirre   for [facility] ([facility])  Guide Mobile: [phone]    If patient is discharged prior to next notation, then this note serves as note for discharge by case management.    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 Yes    Chart Reviewed: Yes      History Provided by: Patient, Spouse  Patient Orientation: Other (see comment) (Lewy body dementia)    Patient Cognition: Alert    Hospitalization in the last 30 days (Readmission):  No    If yes, Readmission Assessment in CM Navigator will be completed.    Advance Directives:      Code Status: Full Code   Patient's Primary Decision Maker is:  (Kayley, wife said she will call their  to have them send the healthcare POA forms. she said she is his healthcare POA)      Discharge Planning:    Patient lives with: Spouse/Significant Other Type of Home: Apartment  Primary Care Giver: Self  Patient Support Systems include: Spouse/Significant Other   Current Financial resources: Medicare ([facility] Medicare)  Current community resources: None  Current services prior to admission: Other (Comment) ([facility] providers)            Current DME:              Type of Home Care services:  None    ADLS  Prior functional level: Independent in ADLs/IADLs  Current functional level: Independent in

## 2025-01-22 NOTE — PROGRESS NOTES
4 Eyes Skin Assessment     NAME:  Karan Fortune  YOB: 1950  MEDICAL RECORD NUMBER:  [id number]    The patient is being assessed for  Admission    I agree that at least one RN has performed a thorough Head to Toe Skin Assessment on the patient. ALL assessment sites listed below have been assessed.      Areas assessed by both nurses:    Head, Face, Ears, Shoulders, Back, Chest, Arms, Elbows, Hands, Sacrum. Buttock, Coccyx, Ischium, Legs. Feet and Heels, Under Medical Devices , and Other          Does the Patient have a Wound? No noted wound(s)       Miguel Angel Prevention initiated by RN: No  Wound Care Orders initiated by RN: No    Pressure Injury (Stage 3,4, Unstageable, DTI, NWPT, and Complex wounds) if present, place Wound referral order by RN under : No    New Ostomies, if present place, Ostomy referral order under : No     Nurse 1 eSignature: Electronically signed by Jenae Youngblood RN on 1/22/25 at 3:14 AM EST    **SHARE this note so that the co-signing nurse can place an eSignature**    Nurse 2 eSignature: Electronically signed by Brad Lazo RN on 1/22/25 at 3:15 AM EST

## 2025-01-22 NOTE — PROGRESS NOTES
Patient arrived to unit in wheelchair from ED around 2010 on 1/21. Patient was accompanied by his wife and caregiver, Kayley. Patient and wife were oriented to the room and call light. Patient was instructed on the importance of using the call light to let staff know when he needs to get up.     Kayley left the unit around 2145. Patient frequently set off bed alarm attempting to get up without using call light. Patient is easily redirected and follows commands. A sitter arrived at 0100 on 1/22.

## 2025-01-23 LAB
ALBUMIN SERPL-MCNC: 3.8 G/DL (ref 3.4–5)
ALP SERPL-CCNC: 58 U/L (ref 40–129)
ALT SERPL-CCNC: 8 U/L (ref 10–40)
ANION GAP SERPL CALCULATED.3IONS-SCNC: 8 MMOL/L (ref 3–16)
APTT BLD: 27 SEC (ref 22.1–36.4)
APTT BLD: 28.7 SEC (ref 22.1–36.4)
APTT BLD: 29 SEC (ref 22.1–36.4)
AST SERPL-CCNC: 19 U/L (ref 15–37)
BILIRUB DIRECT SERPL-MCNC: 0.3 MG/DL (ref 0–0.3)
BILIRUB INDIRECT SERPL-MCNC: 0.6 MG/DL (ref 0–1)
BILIRUB SERPL-MCNC: 0.9 MG/DL (ref 0–1)
BUN SERPL-MCNC: 13 MG/DL (ref 7–20)
CALCIUM SERPL-MCNC: 8.3 MG/DL (ref 8.3–10.6)
CHLORIDE SERPL-SCNC: 104 MMOL/L (ref 99–110)
CO2 SERPL-SCNC: 24 MMOL/L (ref 21–32)
CREAT SERPL-MCNC: 0.7 MG/DL (ref 0.8–1.3)
D-DIMER QUANTITATIVE: 12.53 UG/ML FEU (ref 0–0.6)
D-DIMER QUANTITATIVE: 8.42 UG/ML FEU (ref 0–0.6)
D-DIMER QUANTITATIVE: 9.77 UG/ML FEU (ref 0–0.6)
DEPRECATED RDW RBC AUTO: 15.6 % (ref 12.4–15.4)
EKG ATRIAL RATE: 86 BPM
EKG DIAGNOSIS: NORMAL
EKG P AXIS: 53 DEGREES
EKG P-R INTERVAL: 166 MS
EKG Q-T INTERVAL: 346 MS
EKG QRS DURATION: 82 MS
EKG QTC CALCULATION (BAZETT): 414 MS
EKG R AXIS: 15 DEGREES
EKG T AXIS: 21 DEGREES
EKG VENTRICULAR RATE: 86 BPM
FIBRINOGEN PPP-MCNC: 193 MG/DL (ref 227–534)
FIBRINOGEN PPP-MCNC: 215 MG/DL (ref 227–534)
FIBRINOGEN PPP-MCNC: 219 MG/DL (ref 227–534)
GFR SERPLBLD CREATININE-BSD FMLA CKD-EPI: >90 ML/MIN/{1.73_M2}
GLUCOSE SERPL-MCNC: 148 MG/DL (ref 70–99)
HCT VFR BLD AUTO: 24.2 % (ref 40.5–52.5)
HGB BLD-MCNC: 8.4 G/DL (ref 13.5–17.5)
INR PPP: 1.12 (ref 0.85–1.15)
INR PPP: 1.14 (ref 0.85–1.15)
INR PPP: 1.15 (ref 0.85–1.15)
LDH SERPL L TO P-CCNC: 225 U/L (ref 100–190)
MAGNESIUM SERPL-MCNC: 1.93 MG/DL (ref 1.8–2.4)
MCH RBC QN AUTO: 35.5 PG (ref 26–34)
MCHC RBC AUTO-ENTMCNC: 34.6 G/DL (ref 31–36)
MCV RBC AUTO: 102.6 FL (ref 80–100)
PHOSPHATE SERPL-MCNC: 3.7 MG/DL (ref 2.5–4.9)
PLATELET # BLD AUTO: 67 K/UL (ref 135–450)
PMV BLD AUTO: 10.4 FL (ref 5–10.5)
POTASSIUM SERPL-SCNC: 3.9 MMOL/L (ref 3.5–5.1)
PROT SERPL-MCNC: 5.8 G/DL (ref 6.4–8.2)
PROTHROMBIN TIME: 14.6 SEC (ref 11.9–14.9)
PROTHROMBIN TIME: 14.8 SEC (ref 11.9–14.9)
PROTHROMBIN TIME: 14.9 SEC (ref 11.9–14.9)
RBC # BLD AUTO: 2.36 M/UL (ref 4.2–5.9)
SODIUM SERPL-SCNC: 136 MMOL/L (ref 136–145)
URATE SERPL-MCNC: 3.2 MG/DL (ref 3.5–7.2)
WBC # BLD AUTO: 0.3 K/UL (ref 4–11)

## 2025-01-23 PROCEDURE — 80048 BASIC METABOLIC PNL TOTAL CA: CPT

## 2025-01-23 PROCEDURE — 6370000000 HC RX 637 (ALT 250 FOR IP): Performed by: STUDENT IN AN ORGANIZED HEALTH CARE EDUCATION/TRAINING PROGRAM

## 2025-01-23 PROCEDURE — 85384 FIBRINOGEN ACTIVITY: CPT

## 2025-01-23 PROCEDURE — 2060000000 HC ICU INTERMEDIATE R&B

## 2025-01-23 PROCEDURE — 85027 COMPLETE CBC AUTOMATED: CPT

## 2025-01-23 PROCEDURE — 6360000002 HC RX W HCPCS: Performed by: INTERNAL MEDICINE

## 2025-01-23 PROCEDURE — 84100 ASSAY OF PHOSPHORUS: CPT

## 2025-01-23 PROCEDURE — 2500000003 HC RX 250 WO HCPCS

## 2025-01-23 PROCEDURE — 36592 COLLECT BLOOD FROM PICC: CPT

## 2025-01-23 PROCEDURE — 83735 ASSAY OF MAGNESIUM: CPT

## 2025-01-23 PROCEDURE — 85379 FIBRIN DEGRADATION QUANT: CPT

## 2025-01-23 PROCEDURE — 85730 THROMBOPLASTIN TIME PARTIAL: CPT

## 2025-01-23 PROCEDURE — 93010 ELECTROCARDIOGRAM REPORT: CPT | Performed by: INTERNAL MEDICINE

## 2025-01-23 PROCEDURE — 80076 HEPATIC FUNCTION PANEL: CPT

## 2025-01-23 PROCEDURE — 6370000000 HC RX 637 (ALT 250 FOR IP): Performed by: NURSE PRACTITIONER

## 2025-01-23 PROCEDURE — 85610 PROTHROMBIN TIME: CPT

## 2025-01-23 PROCEDURE — 94150 VITAL CAPACITY TEST: CPT

## 2025-01-23 PROCEDURE — 84550 ASSAY OF BLOOD/URIC ACID: CPT

## 2025-01-23 PROCEDURE — 2580000003 HC RX 258: Performed by: INTERNAL MEDICINE

## 2025-01-23 PROCEDURE — 6370000000 HC RX 637 (ALT 250 FOR IP)

## 2025-01-23 PROCEDURE — 93005 ELECTROCARDIOGRAM TRACING: CPT | Performed by: INTERNAL MEDICINE

## 2025-01-23 PROCEDURE — 83615 LACTATE (LD) (LDH) ENZYME: CPT

## 2025-01-23 RX ORDER — MAGNESIUM SULFATE IN WATER 40 MG/ML
2000 INJECTION, SOLUTION INTRAVENOUS PRN
Status: DISCONTINUED | OUTPATIENT
Start: 2025-01-23 | End: 2025-01-23

## 2025-01-23 RX ORDER — POTASSIUM CHLORIDE 29.8 MG/ML
20 INJECTION INTRAVENOUS PRN
Status: DISCONTINUED | OUTPATIENT
Start: 2025-01-23 | End: 2025-01-23

## 2025-01-23 RX ORDER — BENZOCAINE/MENTHOL 6 MG-10 MG
LOZENGE MUCOUS MEMBRANE 2 TIMES DAILY
Status: DISCONTINUED | OUTPATIENT
Start: 2025-01-23 | End: 2025-02-13 | Stop reason: HOSPADM

## 2025-01-23 RX ADMIN — TRETINOIN 50 MG: 10 CAPSULE ORAL at 08:06

## 2025-01-23 RX ADMIN — DONEPEZIL HYDROCHLORIDE 10 MG: 10 TABLET ORAL at 08:06

## 2025-01-23 RX ADMIN — VALACYCLOVIR HYDROCHLORIDE 500 MG: 500 TABLET, FILM COATED ORAL at 08:05

## 2025-01-23 RX ADMIN — ARSENIC TRIOXIDE 13 MG: 1 INJECTION, SOLUTION INTRAVENOUS at 12:01

## 2025-01-23 RX ADMIN — SODIUM CHLORIDE, PRESERVATIVE FREE 10 ML: 5 INJECTION INTRAVENOUS at 08:05

## 2025-01-23 RX ADMIN — VALACYCLOVIR HYDROCHLORIDE 500 MG: 500 TABLET, FILM COATED ORAL at 20:22

## 2025-01-23 RX ADMIN — ALLOPURINOL 300 MG: 300 TABLET ORAL at 08:05

## 2025-01-23 RX ADMIN — HYDROCORTISONE: 1 CREAM TOPICAL at 20:28

## 2025-01-23 RX ADMIN — TRETINOIN 50 MG: 10 CAPSULE ORAL at 16:49

## 2025-01-23 RX ADMIN — LEVOTHYROXINE SODIUM 175 MCG: 150 TABLET ORAL at 06:47

## 2025-01-23 RX ADMIN — POTASSIUM CHLORIDE 20 MEQ: 400 INJECTION, SOLUTION INTRAVENOUS at 05:32

## 2025-01-23 RX ADMIN — TROSPIUM CHLORIDE 20 MG: 20 TABLET, FILM COATED ORAL at 16:49

## 2025-01-23 RX ADMIN — POTASSIUM CHLORIDE 20 MEQ: 400 INJECTION, SOLUTION INTRAVENOUS at 06:44

## 2025-01-23 RX ADMIN — SODIUM CHLORIDE, PRESERVATIVE FREE 10 ML: 5 INJECTION INTRAVENOUS at 20:24

## 2025-01-23 RX ADMIN — POTASSIUM CHLORIDE 20 MEQ: 400 INJECTION, SOLUTION INTRAVENOUS at 07:59

## 2025-01-23 RX ADMIN — LEVOFLOXACIN 500 MG: 500 TABLET, FILM COATED ORAL at 08:05

## 2025-01-23 RX ADMIN — TROSPIUM CHLORIDE 20 MG: 20 TABLET, FILM COATED ORAL at 06:47

## 2025-01-23 RX ADMIN — FUROSEMIDE 40 MG: 10 INJECTION, SOLUTION INTRAMUSCULAR; INTRAVENOUS at 06:56

## 2025-01-23 RX ADMIN — CETIRIZINE HYDROCHLORIDE 10 MG: 10 TABLET, FILM COATED ORAL at 20:22

## 2025-01-23 RX ADMIN — FLUCONAZOLE 200 MG: 200 TABLET ORAL at 08:05

## 2025-01-23 RX ADMIN — SODIUM CHLORIDE, PRESERVATIVE FREE 10 ML: 5 INJECTION INTRAVENOUS at 20:28

## 2025-01-23 RX ADMIN — QUETIAPINE FUMARATE 50 MG: 25 TABLET ORAL at 20:22

## 2025-01-23 RX ADMIN — POTASSIUM CHLORIDE 20 MEQ: 400 INJECTION, SOLUTION INTRAVENOUS at 09:04

## 2025-01-23 ASSESSMENT — PAIN SCALES - GENERAL
PAINLEVEL_OUTOF10: 0
PAINLEVEL_OUTOF10: 1

## 2025-01-23 ASSESSMENT — PAIN SCALES - WONG BAKER: WONGBAKER_NUMERICALRESPONSE: NO HURT

## 2025-01-23 NOTE — PROGRESS NOTES
Administration: Chemotherapy drug arsenic trioxide independently verified with Kavya Mccurdy RN prior to administration.  Acknowledgement of informed consent for chemotherapy administration verified.  Original order, appropriateness of regimen, drug supplied, height, weight, BSA, dose calculations, expiration dates/times, drug appearance, and two patient identifiers were verified by both RNs.  Drug checked for vesicant/irritant status and for risk of hypersensitivity.  Most recent laboratory values and allergies, were reviewed.  Positive, brisk blood return via CVC was confirmed prior to administration. Chest x-ray for correct line placement reviewed. Karla Coulter RN, RN and Kavya Mccurdy RN verified correct rate of chemotherapy and maintenance IV fluids.  Patient was educated on chemotherapy regimen prior to administration including indication for treatment related to disease & side effects of chemotherapy drug.  Patient verbalizes understanding of all instructions.    Completion of Chemotherapy: Monitoring during infusion done per policy, see Flowsheets.  Blood return verified before, during, and after infusion per policy; no signs of extravasation.  Pt tolerating chemotherapy well and without incident.  Chemotherapy infusion end time on the MAR.

## 2025-01-23 NOTE — PROGRESS NOTES
Patient seen and assessed for standard line care needs.  CVC site remains free of visible signs and symptoms of infection.  Moderate dried blood at site, no active bleeding noted.  No edema, erythema, pain, itching, or warmth noted at and around the insertion site.  Line care performed by Violeta Clark RN.  The need for continued use of the CVC is due to ongoing therapy.  Patient verbalized understanding of the line care education provided by line care RN.  Staff RNs will continue to monitor and assess the CVC site throughout the patient's hospital stay.  Sterile dressing changes will continue to be changed per policy.

## 2025-01-23 NOTE — PROGRESS NOTES
[facility] Progress Note    2025     Karan Fortune    MRN: [id number]    : 1950    Referring MD: No referring provider defined for this encounter.      SUBJECTIVE:  Patient is doing welI.  Tolerating  chemotx well.  No new complaints this morning.  Up in chair, and eating well.      ECOG PS:  (2) Ambulatory and capable of self care, unable to carry out work activity, up and about > 50% or waking hours    KPS: 60% Requires occasional assistance, but is able to care for most of his personal needs    Isolation: None    Medications    Scheduled Meds:   arsenic trioxide (TRISENOX) 13 mg in sodium chloride 0.9 % 250 mL IVPB  13 mg IntraVENous Q24H    furosemide  40 mg IntraVENous Q12H    acetaminophen  650 mg Oral Once    trospium  20 mg Oral BID AC    QUEtiapine  50 mg Oral Nightly    cetirizine  10 mg Oral Nightly    donepezil  10 mg Oral Daily    sodium chloride flush  5-40 mL IntraVENous 2 times per day    Saline Mouthwash  15 mL Swish & Spit 4x Daily AC & HS    valACYclovir  500 mg Oral BID    fluconazole  200 mg Oral Daily    levoFLOXacin  500 mg Oral Daily    allopurinol  300 mg Oral Daily    sodium chloride flush  5-40 mL IntraVENous 2 times per day    tretinoin  22.5 mg/m2 Oral Daily with breakfast    And    tretinoin  22.5 mg/m2 Oral Dinner    levothyroxine  175 mcg Oral Daily     Continuous Infusions:   sodium chloride      sodium chloride      sodium chloride      sodium chloride 100 mL/hr at 25    sodium chloride       PRN Meds:.magnesium sulfate, prochlorperazine **OR** prochlorperazine, potassium chloride, sodium chloride, traMADol, sodium chloride, sodium chloride flush, sodium chloride, ALTEplase (CATHFLO) 2 mg in sterile water 2 mL injection, sodium chloride flush, sodium chloride    ROS:  As noted above, otherwise remainder of 10-point ROS negative    Physical Exam:     I&O:    Intake/Output Summary (Last 24 hours) at 2025 0802  Last data filed at 2025 0644  Gross per        - DVT Prophylaxis: Platelets <50,000 cells/dL - prophylactic lovenox on hold and mechanical prophylaxis with bilateral SCDs while in bed in place.  Contraindications to pharmacologic prophylaxis: Thrombocytopenia  Contraindications to mechanical prophylaxis: None    - Disposition: Unknown at this time    The patient was seen and examined by Dr. Brianne Obregon, APRN - CNP    Rowdy Decker MD  [facility]  [phone]

## 2025-01-23 NOTE — PLAN OF CARE
Problem: Skin/Tissue Integrity - Adult  Goal: Skin integrity remains intact  Outcome: Progressing- patient developing rash on back and bottom. No other skin issues. SUNNI Srivastava assessed rash, allopurinol on hold and nurses to apply hydrocortisone cream as ordered. Care continues.      Problem: Musculoskeletal - Adult  Goal: Return mobility to safest level of function  Outcome: Progressing- patient ambulating in parnell with steady gait.      Problem: Gastrointestinal - Adult  Goal: Minimal or absence of nausea and vomiting  Outcome: Progressing- patient denies nausea during shift.      Problem: Metabolic/Fluid and Electrolytes - Adult  Goal: Electrolytes maintained within normal limits  Outcome: Progressing- potassium replaced per orders.

## 2025-01-24 LAB
ALBUMIN SERPL-MCNC: 3.7 G/DL (ref 3.4–5)
ALP SERPL-CCNC: 58 U/L (ref 40–129)
ALT SERPL-CCNC: 7 U/L (ref 10–40)
ANION GAP SERPL CALCULATED.3IONS-SCNC: 9 MMOL/L (ref 3–16)
APTT BLD: 29.3 SEC (ref 22.1–36.4)
APTT BLD: 30.9 SEC (ref 22.1–36.4)
AST SERPL-CCNC: 20 U/L (ref 15–37)
BILIRUB DIRECT SERPL-MCNC: 0.2 MG/DL (ref 0–0.3)
BILIRUB INDIRECT SERPL-MCNC: 0.3 MG/DL (ref 0–1)
BILIRUB SERPL-MCNC: 0.5 MG/DL (ref 0–1)
BUN SERPL-MCNC: 14 MG/DL (ref 7–20)
CALCIUM SERPL-MCNC: 8.3 MG/DL (ref 8.3–10.6)
CHLORIDE SERPL-SCNC: 107 MMOL/L (ref 99–110)
CO2 SERPL-SCNC: 23 MMOL/L (ref 21–32)
CREAT SERPL-MCNC: 0.7 MG/DL (ref 0.8–1.3)
D-DIMER QUANTITATIVE: 5.32 UG/ML FEU (ref 0–0.6)
D-DIMER QUANTITATIVE: 5.69 UG/ML FEU (ref 0–0.6)
DEPRECATED RDW RBC AUTO: 16.2 % (ref 12.4–15.4)
FIBRINOGEN PPP-MCNC: 192 MG/DL (ref 227–534)
FIBRINOGEN PPP-MCNC: 238 MG/DL (ref 227–534)
GFR SERPLBLD CREATININE-BSD FMLA CKD-EPI: >90 ML/MIN/{1.73_M2}
GLUCOSE SERPL-MCNC: 113 MG/DL (ref 70–99)
HCT VFR BLD AUTO: 22.3 % (ref 40.5–52.5)
HGB BLD-MCNC: 7.8 G/DL (ref 13.5–17.5)
INR PPP: 1.09 (ref 0.85–1.15)
INR PPP: 1.1 (ref 0.85–1.15)
LDH SERPL L TO P-CCNC: 198 U/L (ref 100–190)
MAGNESIUM SERPL-MCNC: 1.9 MG/DL (ref 1.8–2.4)
MCH RBC QN AUTO: 35.7 PG (ref 26–34)
MCHC RBC AUTO-ENTMCNC: 34.7 G/DL (ref 31–36)
MCV RBC AUTO: 102.7 FL (ref 80–100)
PHOSPHATE SERPL-MCNC: 2.6 MG/DL (ref 2.5–4.9)
PLATELET # BLD AUTO: 57 K/UL (ref 135–450)
PMV BLD AUTO: 9.9 FL (ref 5–10.5)
POTASSIUM SERPL-SCNC: 3.7 MMOL/L (ref 3.5–5.1)
PROT SERPL-MCNC: 5.6 G/DL (ref 6.4–8.2)
PROTHROMBIN TIME: 14.3 SEC (ref 11.9–14.9)
PROTHROMBIN TIME: 14.4 SEC (ref 11.9–14.9)
RBC # BLD AUTO: 2.17 M/UL (ref 4.2–5.9)
SODIUM SERPL-SCNC: 139 MMOL/L (ref 136–145)
URATE SERPL-MCNC: 3 MG/DL (ref 3.5–7.2)
WBC # BLD AUTO: 0.5 K/UL (ref 4–11)

## 2025-01-24 PROCEDURE — 83735 ASSAY OF MAGNESIUM: CPT

## 2025-01-24 PROCEDURE — 85027 COMPLETE CBC AUTOMATED: CPT

## 2025-01-24 PROCEDURE — 85379 FIBRIN DEGRADATION QUANT: CPT

## 2025-01-24 PROCEDURE — 2500000003 HC RX 250 WO HCPCS

## 2025-01-24 PROCEDURE — 6370000000 HC RX 637 (ALT 250 FOR IP): Performed by: STUDENT IN AN ORGANIZED HEALTH CARE EDUCATION/TRAINING PROGRAM

## 2025-01-24 PROCEDURE — 36592 COLLECT BLOOD FROM PICC: CPT

## 2025-01-24 PROCEDURE — 83615 LACTATE (LD) (LDH) ENZYME: CPT

## 2025-01-24 PROCEDURE — 6360000002 HC RX W HCPCS: Performed by: NURSE PRACTITIONER

## 2025-01-24 PROCEDURE — 6370000000 HC RX 637 (ALT 250 FOR IP)

## 2025-01-24 PROCEDURE — 85730 THROMBOPLASTIN TIME PARTIAL: CPT

## 2025-01-24 PROCEDURE — 80048 BASIC METABOLIC PNL TOTAL CA: CPT

## 2025-01-24 PROCEDURE — 2580000003 HC RX 258: Performed by: INTERNAL MEDICINE

## 2025-01-24 PROCEDURE — 84100 ASSAY OF PHOSPHORUS: CPT

## 2025-01-24 PROCEDURE — 6370000000 HC RX 637 (ALT 250 FOR IP): Performed by: NURSE PRACTITIONER

## 2025-01-24 PROCEDURE — 2060000000 HC ICU INTERMEDIATE R&B

## 2025-01-24 PROCEDURE — 80076 HEPATIC FUNCTION PANEL: CPT

## 2025-01-24 PROCEDURE — 85610 PROTHROMBIN TIME: CPT

## 2025-01-24 PROCEDURE — 2580000003 HC RX 258: Performed by: NURSE PRACTITIONER

## 2025-01-24 PROCEDURE — 84550 ASSAY OF BLOOD/URIC ACID: CPT

## 2025-01-24 PROCEDURE — 85384 FIBRINOGEN ACTIVITY: CPT

## 2025-01-24 PROCEDURE — 6360000002 HC RX W HCPCS: Performed by: INTERNAL MEDICINE

## 2025-01-24 RX ADMIN — VALACYCLOVIR HYDROCHLORIDE 500 MG: 500 TABLET, FILM COATED ORAL at 08:08

## 2025-01-24 RX ADMIN — QUETIAPINE FUMARATE 50 MG: 25 TABLET ORAL at 21:05

## 2025-01-24 RX ADMIN — TRETINOIN 50 MG: 10 CAPSULE ORAL at 17:38

## 2025-01-24 RX ADMIN — SODIUM CHLORIDE, PRESERVATIVE FREE 10 ML: 5 INJECTION INTRAVENOUS at 08:12

## 2025-01-24 RX ADMIN — TRAMADOL HYDROCHLORIDE 50 MG: 50 TABLET, COATED ORAL at 16:12

## 2025-01-24 RX ADMIN — MAGNESIUM SULFATE HEPTAHYDRATE 4000 MG: 40 INJECTION, SOLUTION INTRAVENOUS at 09:01

## 2025-01-24 RX ADMIN — LEVOFLOXACIN 500 MG: 500 TABLET, FILM COATED ORAL at 08:08

## 2025-01-24 RX ADMIN — FLUCONAZOLE 200 MG: 200 TABLET ORAL at 08:08

## 2025-01-24 RX ADMIN — DONEPEZIL HYDROCHLORIDE 10 MG: 10 TABLET ORAL at 08:08

## 2025-01-24 RX ADMIN — TROSPIUM CHLORIDE 20 MG: 20 TABLET, FILM COATED ORAL at 06:49

## 2025-01-24 RX ADMIN — SODIUM CHLORIDE, PRESERVATIVE FREE 10 ML: 5 INJECTION INTRAVENOUS at 21:05

## 2025-01-24 RX ADMIN — POTASSIUM CHLORIDE 20 MEQ: 400 INJECTION, SOLUTION INTRAVENOUS at 08:02

## 2025-01-24 RX ADMIN — TROSPIUM CHLORIDE 20 MG: 20 TABLET, FILM COATED ORAL at 15:50

## 2025-01-24 RX ADMIN — SODIUM CHLORIDE, PRESERVATIVE FREE 20 ML: 5 INJECTION INTRAVENOUS at 21:05

## 2025-01-24 RX ADMIN — Medication 3 MG: at 21:05

## 2025-01-24 RX ADMIN — HYDROCORTISONE: 1 CREAM TOPICAL at 08:13

## 2025-01-24 RX ADMIN — POTASSIUM CHLORIDE 20 MEQ: 400 INJECTION, SOLUTION INTRAVENOUS at 10:27

## 2025-01-24 RX ADMIN — POTASSIUM CHLORIDE 20 MEQ: 400 INJECTION, SOLUTION INTRAVENOUS at 06:48

## 2025-01-24 RX ADMIN — HYDROCORTISONE: 1 CREAM TOPICAL at 21:05

## 2025-01-24 RX ADMIN — VALACYCLOVIR HYDROCHLORIDE 500 MG: 500 TABLET, FILM COATED ORAL at 21:05

## 2025-01-24 RX ADMIN — SODIUM CHLORIDE: 9 INJECTION, SOLUTION INTRAVENOUS at 21:25

## 2025-01-24 RX ADMIN — LEVOTHYROXINE SODIUM 175 MCG: 150 TABLET ORAL at 06:49

## 2025-01-24 RX ADMIN — CETIRIZINE HYDROCHLORIDE 10 MG: 10 TABLET, FILM COATED ORAL at 21:05

## 2025-01-24 RX ADMIN — TRETINOIN 50 MG: 10 CAPSULE ORAL at 08:08

## 2025-01-24 RX ADMIN — ARSENIC TRIOXIDE 13 MG: 1 INJECTION, SOLUTION INTRAVENOUS at 13:38

## 2025-01-24 RX ADMIN — POTASSIUM CHLORIDE 20 MEQ: 400 INJECTION, SOLUTION INTRAVENOUS at 09:04

## 2025-01-24 RX ADMIN — SODIUM CHLORIDE: 9 INJECTION, SOLUTION INTRAVENOUS at 02:44

## 2025-01-24 ASSESSMENT — PAIN SCALES - GENERAL
PAINLEVEL_OUTOF10: 0

## 2025-01-24 NOTE — ONCOLOGY
Administration: Chemotherapy drug arsenic trioxide independently verified with Shayna WHITE RN prior to administration.  Acknowledgement of informed consent for chemotherapy administration verified.  Original order, appropriateness of regimen, drug supplied, height, weight, BSA, dose calculations, expiration dates/times, drug appearance, and two patient identifiers were verified by both RNs.  Drug checked for vesicant/irritant status and for risk of hypersensitivity.  Most recent laboratory values and allergies, were reviewed.  Positive, brisk blood return via CVC was confirmed prior to administration. Chest x-ray for correct line placement reviewed. Clay Vu RN and Shayna WHITE RN verified correct rate of chemotherapy and maintenance IV fluids.  Patient was educated on chemotherapy regimen prior to administration including indication for treatment related to disease & side effects of chemotherapy drug.  Patient verbalizes understanding of all instructions.      Completion of Chemotherapy: Monitoring during infusion done per policy, see Flowsheets.  Blood return verified before, during, and after infusion per policy; no signs of extravasation.  Pt tolerated chemotherapy well and without incident.  Chemotherapy infusion end time on the MAR.

## 2025-01-24 NOTE — PROGRESS NOTES
[facility] Progress Note    2025     Karan Fortune    MRN: [id number]    : 1950    Referring MD: No referring provider defined for this encounter.      SUBJECTIVE:  Patient is doing welI.  Tolerating  chemotx well.  No new complaints this morning.        ECOG PS:  (2) Ambulatory and capable of self care, unable to carry out work activity, up and about > 50% or waking hours    KPS: 60% Requires occasional assistance, but is able to care for most of his personal needs    Isolation: None    Medications    Scheduled Meds:   hydrocortisone   Topical BID    arsenic trioxide (TRISENOX) 13 mg in sodium chloride 0.9 % 250 mL IVPB  13 mg IntraVENous Q24H    furosemide  40 mg IntraVENous Q12H    acetaminophen  650 mg Oral Once    trospium  20 mg Oral BID AC    QUEtiapine  50 mg Oral Nightly    cetirizine  10 mg Oral Nightly    donepezil  10 mg Oral Daily    sodium chloride flush  5-40 mL IntraVENous 2 times per day    Saline Mouthwash  15 mL Swish & Spit 4x Daily AC & HS    valACYclovir  500 mg Oral BID    fluconazole  200 mg Oral Daily    levoFLOXacin  500 mg Oral Daily    [Held by provider] allopurinol  300 mg Oral Daily    sodium chloride flush  5-40 mL IntraVENous 2 times per day    tretinoin  22.5 mg/m2 Oral Daily with breakfast    And    tretinoin  22.5 mg/m2 Oral Dinner    levothyroxine  175 mcg Oral Daily     Continuous Infusions:   sodium chloride      sodium chloride      sodium chloride      sodium chloride 50 mL/hr at 25 0244    sodium chloride       PRN Meds:.magnesium sulfate, prochlorperazine **OR** prochlorperazine, potassium chloride, sodium chloride, traMADol, sodium chloride, sodium chloride flush, sodium chloride, ALTEplase (CATHFLO) 2 mg in sterile water 2 mL injection, sodium chloride flush, sodium chloride    ROS:  As noted above, otherwise remainder of 10-point ROS negative    Physical Exam:     I&O:    Intake/Output Summary (Last 24 hours) at 2025 0835  Last data filed at        - DVT Prophylaxis: Platelets <50,000 cells/dL - prophylactic lovenox on hold and mechanical prophylaxis with bilateral SCDs while in bed in place.  Contraindications to pharmacologic prophylaxis: Thrombocytopenia  Contraindications to mechanical prophylaxis: None    - Disposition: Unknown at this time    The patient was seen and examined by Dr. Brianne Alvarado, APRN - CNP    Rowdy Decker MD  [facility]  [phone]

## 2025-01-24 NOTE — PROGRESS NOTES
Pt asked this RN regarding night time PRN medication for sleep, NP Bunting messaged via [employer], see new orders.

## 2025-01-24 NOTE — PLAN OF CARE
Problem: Safety - Adult  Goal: Free from fall injury  Outcome: Progressing  Flowsheets (Taken 1/24/2025 1617)  Free From Fall Injury: Instruct family/caregiver on patient safety  Note: Pt is a high fall risk (see [facility] Fall Risk assessment).  Oriented pt to room and call light. Instructed pt to call for help when needed.  Call light and personal items within reach.  Bed in lowest position, brakes on, and 2/4 side rails up.  Non-skid footwear on. Falls risk stop sign on door; hourly visual checks implemented.  Falls risk arm band on pt.  Bed alarm is on.  Pt using call light appropriately.  Will continue to monitor.       Problem: Musculoskeletal - Adult  Goal: Return mobility to safest level of function  Outcome: Progressing  Flowsheets (Taken 1/24/2025 1617)  Return Mobility to Safest Level of Function: Assess patient stability and activity tolerance for standing, transferring and ambulating with or without assistive devices  Note: Pt ambulate to bathroom during shift.      Problem: Infection - Adult  Goal: Absence of infection during hospitalization  Outcome: Progressing  Flowsheets (Taken 1/24/2025 1617)  Absence of infection during hospitalization:   Assess and monitor for signs and symptoms of infection   Monitor lab/diagnostic results   Monitor all insertion sites i.e., indwelling lines, tubes and drains  Note: Pt afebrile during shift. No new incidence of infection.      Problem: Metabolic/Fluid and Electrolytes - Adult  Goal: Electrolytes maintained within normal limits  Outcome: Progressing  Flowsheets (Taken 1/24/2025 1617)  Electrolytes maintained within normal limits: Monitor labs and assess patient for signs and symptoms of electrolyte imbalances  Note:   Lab Results   Component Value Date     01/24/2025    K 3.7 01/24/2025     01/24/2025    CO2 23 01/24/2025      Problem: Hematologic - Adult  Goal: Maintains hematologic stability  Outcome: Progressing  Flowsheets (Taken 1/24/2025  1617)  Maintains hematologic stability:   Assess for signs and symptoms of bleeding or hemorrhage   Monitor labs for bleeding or clotting disorders  Note: Patient's hemoglobin this AM:   Recent Labs     01/24/25 0415   HGB 7.8*     Patient's platelet count this AM:   Recent Labs     01/24/25 0415   PLT 57*    Thrombocytopenia Precautions in place.  Patient showing no signs or symptoms of active bleeding.  Transfusion not indicated at this time.  Patient verbalizes understanding of all instructions. Will continue to assess and implement POC. Call light within reach and hourly rounding in place.      Problem: Pain  Goal: Verbalizes/displays adequate comfort level or baseline comfort level  Outcome: Progressing  Flowsheets (Taken 1/24/2025 1617)  Verbalizes/displays adequate comfort level or baseline comfort level:   Encourage patient to monitor pain and request assistance   Assess pain using appropriate pain scale   Administer analgesics based on type and severity of pain and evaluate response  Note: Pt encouraged to be active participant in pain management during shift. Pt given PRN tramadol for headache pain during shift.     Problem: Neurosensory - Adult  Goal: Achieves stable or improved neurological status  Outcome: Not Progressing  Flowsheets (Taken 1/24/2025 1617)  Achieves stable or improved neurological status: Assess for and report changes in neurological status  Note: Pt AO x1-2 during shift.

## 2025-01-24 NOTE — CARE COORDINATION
Obtained healthcare POA from pt's  via email as the fax was not working.     Wife Kayley is primary healthcare POA and Nicholasdelio Chackoer, friend is first alternate    Placed in chart. Wife updated.     Geraldine DE LA O, ADELFO-S   for [facility] ([facility])  [employer]: [phone]

## 2025-01-25 LAB
ALBUMIN SERPL-MCNC: 3.5 G/DL (ref 3.4–5)
ALP SERPL-CCNC: 61 U/L (ref 40–129)
ALT SERPL-CCNC: 7 U/L (ref 10–40)
ANION GAP SERPL CALCULATED.3IONS-SCNC: 10 MMOL/L (ref 3–16)
ANION GAP SERPL CALCULATED.3IONS-SCNC: 10 MMOL/L (ref 3–16)
APTT BLD: 37.4 SEC (ref 22.1–36.4)
AST SERPL-CCNC: 22 U/L (ref 15–37)
BILIRUB DIRECT SERPL-MCNC: 0.2 MG/DL (ref 0–0.3)
BILIRUB INDIRECT SERPL-MCNC: 0.3 MG/DL (ref 0–1)
BILIRUB SERPL-MCNC: 0.5 MG/DL (ref 0–1)
BLOOD BANK DISPENSE STATUS: NORMAL
BLOOD BANK PRODUCT CODE: NORMAL
BPU ID: NORMAL
BUN SERPL-MCNC: 12 MG/DL (ref 7–20)
BUN SERPL-MCNC: 13 MG/DL (ref 7–20)
CALCIUM SERPL-MCNC: 8.1 MG/DL (ref 8.3–10.6)
CALCIUM SERPL-MCNC: 8.1 MG/DL (ref 8.3–10.6)
CHLORIDE SERPL-SCNC: 105 MMOL/L (ref 99–110)
CHLORIDE SERPL-SCNC: 108 MMOL/L (ref 99–110)
CO2 SERPL-SCNC: 21 MMOL/L (ref 21–32)
CO2 SERPL-SCNC: 22 MMOL/L (ref 21–32)
CREAT SERPL-MCNC: 0.8 MG/DL (ref 0.8–1.3)
CREAT SERPL-MCNC: 0.8 MG/DL (ref 0.8–1.3)
D-DIMER QUANTITATIVE: 3.89 UG/ML FEU (ref 0–0.6)
D-DIMER QUANTITATIVE: 4.83 UG/ML FEU (ref 0–0.6)
DEPRECATED RDW RBC AUTO: 16.4 % (ref 12.4–15.4)
DESCRIPTION BLOOD BANK: NORMAL
EKG ATRIAL RATE: 95 BPM
EKG DIAGNOSIS: NORMAL
EKG P AXIS: 37 DEGREES
EKG P-R INTERVAL: 144 MS
EKG Q-T INTERVAL: 356 MS
EKG QRS DURATION: 94 MS
EKG QTC CALCULATION (BAZETT): 447 MS
EKG R AXIS: 6 DEGREES
EKG T AXIS: 76 DEGREES
EKG VENTRICULAR RATE: 95 BPM
FIBRINOGEN PPP-MCNC: 243 MG/DL (ref 227–534)
FIBRINOGEN PPP-MCNC: 279 MG/DL (ref 227–534)
GFR SERPLBLD CREATININE-BSD FMLA CKD-EPI: >90 ML/MIN/{1.73_M2}
GFR SERPLBLD CREATININE-BSD FMLA CKD-EPI: >90 ML/MIN/{1.73_M2}
GLUCOSE SERPL-MCNC: 136 MG/DL (ref 70–99)
GLUCOSE SERPL-MCNC: 139 MG/DL (ref 70–99)
HCT VFR BLD AUTO: 21.4 % (ref 40.5–52.5)
HGB BLD-MCNC: 7.3 G/DL (ref 13.5–17.5)
INR PPP: 1.05 (ref 0.85–1.15)
LDH SERPL L TO P-CCNC: 201 U/L (ref 100–190)
MAGNESIUM SERPL-MCNC: 2.07 MG/DL (ref 1.8–2.4)
MAGNESIUM SERPL-MCNC: 2.21 MG/DL (ref 1.8–2.4)
MCH RBC QN AUTO: 34.9 PG (ref 26–34)
MCHC RBC AUTO-ENTMCNC: 34.2 G/DL (ref 31–36)
MCV RBC AUTO: 102.3 FL (ref 80–100)
PHOSPHATE SERPL-MCNC: 3.2 MG/DL (ref 2.5–4.9)
PLATELET # BLD AUTO: 50 K/UL (ref 135–450)
PMV BLD AUTO: 10.3 FL (ref 5–10.5)
POTASSIUM SERPL-SCNC: 3.8 MMOL/L (ref 3.5–5.1)
POTASSIUM SERPL-SCNC: 4.1 MMOL/L (ref 3.5–5.1)
PROT SERPL-MCNC: 5.5 G/DL (ref 6.4–8.2)
PROTHROMBIN TIME: 13.9 SEC (ref 11.9–14.9)
RBC # BLD AUTO: 2.09 M/UL (ref 4.2–5.9)
SODIUM SERPL-SCNC: 136 MMOL/L (ref 136–145)
SODIUM SERPL-SCNC: 140 MMOL/L (ref 136–145)
URATE SERPL-MCNC: 2.9 MG/DL (ref 3.5–7.2)
WBC # BLD AUTO: 0.5 K/UL (ref 4–11)

## 2025-01-25 PROCEDURE — 2580000003 HC RX 258: Performed by: INTERNAL MEDICINE

## 2025-01-25 PROCEDURE — 6360000002 HC RX W HCPCS: Performed by: INTERNAL MEDICINE

## 2025-01-25 PROCEDURE — 93005 ELECTROCARDIOGRAM TRACING: CPT | Performed by: STUDENT IN AN ORGANIZED HEALTH CARE EDUCATION/TRAINING PROGRAM

## 2025-01-25 PROCEDURE — 85027 COMPLETE CBC AUTOMATED: CPT

## 2025-01-25 PROCEDURE — 85610 PROTHROMBIN TIME: CPT

## 2025-01-25 PROCEDURE — 85379 FIBRIN DEGRADATION QUANT: CPT

## 2025-01-25 PROCEDURE — 93005 ELECTROCARDIOGRAM TRACING: CPT | Performed by: NURSE PRACTITIONER

## 2025-01-25 PROCEDURE — 80048 BASIC METABOLIC PNL TOTAL CA: CPT

## 2025-01-25 PROCEDURE — 6370000000 HC RX 637 (ALT 250 FOR IP)

## 2025-01-25 PROCEDURE — 85730 THROMBOPLASTIN TIME PARTIAL: CPT

## 2025-01-25 PROCEDURE — 6370000000 HC RX 637 (ALT 250 FOR IP): Performed by: NURSE PRACTITIONER

## 2025-01-25 PROCEDURE — 93010 ELECTROCARDIOGRAM REPORT: CPT | Performed by: INTERNAL MEDICINE

## 2025-01-25 PROCEDURE — P9036 PLATELET PHERESIS IRRADIATED: HCPCS

## 2025-01-25 PROCEDURE — 83615 LACTATE (LD) (LDH) ENZYME: CPT

## 2025-01-25 PROCEDURE — 2580000003 HC RX 258: Performed by: NURSE PRACTITIONER

## 2025-01-25 PROCEDURE — 36592 COLLECT BLOOD FROM PICC: CPT

## 2025-01-25 PROCEDURE — 2060000000 HC ICU INTERMEDIATE R&B

## 2025-01-25 PROCEDURE — 83735 ASSAY OF MAGNESIUM: CPT

## 2025-01-25 PROCEDURE — 84550 ASSAY OF BLOOD/URIC ACID: CPT

## 2025-01-25 PROCEDURE — 85384 FIBRINOGEN ACTIVITY: CPT

## 2025-01-25 PROCEDURE — 80076 HEPATIC FUNCTION PANEL: CPT

## 2025-01-25 PROCEDURE — 84100 ASSAY OF PHOSPHORUS: CPT

## 2025-01-25 PROCEDURE — 2500000003 HC RX 250 WO HCPCS

## 2025-01-25 PROCEDURE — 6370000000 HC RX 637 (ALT 250 FOR IP): Performed by: STUDENT IN AN ORGANIZED HEALTH CARE EDUCATION/TRAINING PROGRAM

## 2025-01-25 PROCEDURE — 36430 TRANSFUSION BLD/BLD COMPNT: CPT

## 2025-01-25 RX ORDER — SODIUM CHLORIDE 9 MG/ML
INJECTION, SOLUTION INTRAVENOUS PRN
Status: DISCONTINUED | OUTPATIENT
Start: 2025-01-25 | End: 2025-02-13 | Stop reason: HOSPADM

## 2025-01-25 RX ADMIN — FLUCONAZOLE 200 MG: 200 TABLET ORAL at 09:17

## 2025-01-25 RX ADMIN — POTASSIUM CHLORIDE 20 MEQ: 400 INJECTION, SOLUTION INTRAVENOUS at 21:57

## 2025-01-25 RX ADMIN — VALACYCLOVIR HYDROCHLORIDE 500 MG: 500 TABLET, FILM COATED ORAL at 20:33

## 2025-01-25 RX ADMIN — LEVOFLOXACIN 500 MG: 500 TABLET, FILM COATED ORAL at 09:17

## 2025-01-25 RX ADMIN — SODIUM CHLORIDE: 9 INJECTION, SOLUTION INTRAVENOUS at 16:35

## 2025-01-25 RX ADMIN — TRETINOIN 50 MG: 10 CAPSULE ORAL at 09:18

## 2025-01-25 RX ADMIN — Medication 3 MG: at 20:33

## 2025-01-25 RX ADMIN — TROSPIUM CHLORIDE 20 MG: 20 TABLET, FILM COATED ORAL at 06:55

## 2025-01-25 RX ADMIN — ARSENIC TRIOXIDE 13 MG: 1 INJECTION, SOLUTION INTRAVENOUS at 12:39

## 2025-01-25 RX ADMIN — HYDROCORTISONE: 1 CREAM TOPICAL at 09:21

## 2025-01-25 RX ADMIN — HYDROCORTISONE: 1 CREAM TOPICAL at 20:38

## 2025-01-25 RX ADMIN — SODIUM CHLORIDE, PRESERVATIVE FREE 10 ML: 5 INJECTION INTRAVENOUS at 09:30

## 2025-01-25 RX ADMIN — SODIUM CHLORIDE, PRESERVATIVE FREE 10 ML: 5 INJECTION INTRAVENOUS at 20:33

## 2025-01-25 RX ADMIN — LEVOTHYROXINE SODIUM 175 MCG: 150 TABLET ORAL at 06:55

## 2025-01-25 RX ADMIN — TRETINOIN 50 MG: 10 CAPSULE ORAL at 16:25

## 2025-01-25 RX ADMIN — TROSPIUM CHLORIDE 20 MG: 20 TABLET, FILM COATED ORAL at 16:25

## 2025-01-25 RX ADMIN — VALACYCLOVIR HYDROCHLORIDE 500 MG: 500 TABLET, FILM COATED ORAL at 09:17

## 2025-01-25 RX ADMIN — DONEPEZIL HYDROCHLORIDE 10 MG: 10 TABLET ORAL at 09:18

## 2025-01-25 RX ADMIN — POTASSIUM CHLORIDE 20 MEQ: 400 INJECTION, SOLUTION INTRAVENOUS at 19:18

## 2025-01-25 RX ADMIN — POTASSIUM CHLORIDE 20 MEQ: 400 INJECTION, SOLUTION INTRAVENOUS at 20:31

## 2025-01-25 RX ADMIN — POTASSIUM CHLORIDE 20 MEQ: 400 INJECTION, SOLUTION INTRAVENOUS at 22:58

## 2025-01-25 RX ADMIN — QUETIAPINE FUMARATE 50 MG: 25 TABLET ORAL at 20:33

## 2025-01-25 RX ADMIN — SODIUM CHLORIDE, PRESERVATIVE FREE 10 ML: 5 INJECTION INTRAVENOUS at 20:34

## 2025-01-25 RX ADMIN — CETIRIZINE HYDROCHLORIDE 10 MG: 10 TABLET, FILM COATED ORAL at 20:33

## 2025-01-25 ASSESSMENT — PAIN SCALES - GENERAL
PAINLEVEL_OUTOF10: 0

## 2025-01-25 NOTE — PROGRESS NOTES
[facility] Progress Note    2025     Karan Fortune    MRN: [id number]    : 1950    Referring MD: No referring provider defined for this encounter.      SUBJECTIVE:    -Tolerating chemo well.   -No new complaints       ECOG PS:  (2) Ambulatory and capable of self care, unable to carry out work activity, up and about > 50% or waking hours    KPS: 60% Requires occasional assistance, but is able to care for most of his personal needs    Isolation: None    Medications    Scheduled Meds:   hydrocortisone   Topical BID    arsenic trioxide (TRISENOX) 13 mg in sodium chloride 0.9 % 250 mL IVPB  13 mg IntraVENous Q24H    furosemide  40 mg IntraVENous Q12H    acetaminophen  650 mg Oral Once    trospium  20 mg Oral BID AC    QUEtiapine  50 mg Oral Nightly    cetirizine  10 mg Oral Nightly    donepezil  10 mg Oral Daily    sodium chloride flush  5-40 mL IntraVENous 2 times per day    Saline Mouthwash  15 mL Swish & Spit 4x Daily AC & HS    valACYclovir  500 mg Oral BID    fluconazole  200 mg Oral Daily    levoFLOXacin  500 mg Oral Daily    [Held by provider] allopurinol  300 mg Oral Daily    sodium chloride flush  5-40 mL IntraVENous 2 times per day    tretinoin  22.5 mg/m2 Oral Daily with breakfast    And    tretinoin  22.5 mg/m2 Oral Dinner    levothyroxine  175 mcg Oral Daily     Continuous Infusions:   sodium chloride      sodium chloride      sodium chloride      sodium chloride      sodium chloride 50 mL/hr at 25    sodium chloride       PRN Meds:.sodium chloride, melatonin, magnesium sulfate, prochlorperazine **OR** prochlorperazine, potassium chloride, sodium chloride, traMADol, sodium chloride, sodium chloride flush, sodium chloride, ALTEplase (CATHFLO) 2 mg in sterile water 2 mL injection, sodium chloride flush, sodium chloride    ROS:  As noted above, otherwise remainder of 10-point ROS negative    Physical Exam:     I&O:    Intake/Output Summary (Last 24 hours) at 2025 0835  Last data

## 2025-01-25 NOTE — PROGRESS NOTES
Patient complained of chest pain to PCA. Upon assessment, patient denied chest pain to this RN, stating it had been \"mild.\" Stat EKG completed and results sent via [employer] to Alyssa Alvarado NP. New orders obtained for lab collection of BMP and magnesium levels. BMP and magnesium labs collected and sent to lab.

## 2025-01-25 NOTE — PLAN OF CARE
Will continue to assess and implement POC. Call light within reach and hourly rounding in place.      Problem: Metabolic/Fluid and Electrolytes - Adult  Goal: Electrolytes maintained within normal limits  1/25/2025 0458 by Alondra Kaufman RN  Outcome: Progressing  Note: Patient required no replacements this am.     Problem: Hematologic - Adult  Goal: Maintains hematologic stability  1/25/2025 0458 by Alondra Kaufman RN  Outcome: Not Progressing  Note: Patient's hemoglobin this AM:   Recent Labs     01/25/25  0305   HGB 7.3*     Patient's platelet count this AM:   Recent Labs     01/25/25  0305   PLT 50*    Thrombocytopenia Precautions in place.  Patient showing no signs or symptoms of active bleeding.  Patient transfused blood products per orders - see flowsheet.  Patient verbalizes understanding of all instructions. Will continue to assess and implement POC. Call light within reach and hourly rounding in place.      Problem: Pain  Goal: Verbalizes/displays adequate comfort level or baseline comfort level  1/25/2025 0458 by Alondra Kaufman RN  Outcome: Progressing  Note: Patient has no complaints of pain during shift. Will continue to assess comfort and pain on 0-10 number scale and medicate per order while encouraging non pharmacological techniques as well. Call light within reach and hourly rounding in place.      Problem: Neurosensory - Adult  Goal: Achieves stable or improved neurological status  1/25/2025 0458 by Alondra Kaufman RN  Outcome: Progressing  Note: Patient remains intermittently oriented to place , time and situation. See flowsheet for further detailed information.  1/24/2025 1617 by Clay uV, RN  Outcome: Not Progressing  Flowsheets (Taken 1/24/2025 1617)  Achieves stable or improved neurological status: Assess for and report changes in neurological status  Note: Pt AO x1-2 during shift.      Problem: Hematologic - Adult  Goal: Maintains hematologic stability  1/25/2025 0458  by Alondra Kaufman, RN  Outcome: Not Progressing  Note: Patient's hemoglobin this AM:   Recent Labs     01/25/25  0305   HGB 7.3*     Patient's platelet count this AM:   Recent Labs     01/25/25  0305   PLT 50*    Thrombocytopenia Precautions in place.  Patient showing no signs or symptoms of active bleeding.  Patient transfused blood products per orders - see flowsheet.  Patient verbalizes understanding of all instructions. Will continue to assess and implement POC. Call light within reach and hourly rounding in place.   1/24/2025 1617 by Clay Vu, RN  Outcome: Progressing  Flowsheets (Taken 1/24/2025 1617)  Maintains hematologic stability:   Assess for signs and symptoms of bleeding or hemorrhage   Monitor labs for bleeding or clotting disorders  Note: Patient's hemoglobin this AM:   Recent Labs     01/24/25  0415   HGB 7.8*     Patient's platelet count this AM:   Recent Labs     01/24/25  0415   PLT 57*    Thrombocytopenia Precautions in place.  Patient showing no signs or symptoms of active bleeding.  Transfusion not indicated at this time.  Patient verbalizes understanding of all instructions. Will continue to assess and implement POC. Call light within reach and hourly rounding in place.

## 2025-01-25 NOTE — ONCOLOGY
Administration: Chemotherapy drug arsenic independently verified with Radha Tidwell RN prior to administration.  Acknowledgement of informed consent for chemotherapy administration verified.  Original order, appropriateness of regimen, drug supplied, height, weight, BSA, dose calculations, expiration dates/times, drug appearance, and two patient identifiers were verified by both RNs.  Drug checked for vesicant/irritant status and for risk of hypersensitivity.  Most recent laboratory values and allergies, were reviewed.  Positive, brisk blood return via CVC was confirmed prior to administration. Chest x-ray for correct line placement reviewed. Breonna James RN and Radha Tidwell RN verified correct rate of chemotherapy and maintenance IV fluids.  Patient was educated on chemotherapy regimen prior to administration including indication for treatment related to disease & side effects of chemotherapy drug.  Patient verbalizes understanding of all instructions.    Completion of Chemotherapy: Monitoring during infusion done per policy, see Flowsheets.  Blood return verified before, during, and after infusion per policy; no signs of extravasation.  Pt tolerated chemotherapy well and without incident.  Chemotherapy infusion end time on the MAR.

## 2025-01-26 LAB
ALBUMIN SERPL-MCNC: 3.7 G/DL (ref 3.4–5)
ALP SERPL-CCNC: 72 U/L (ref 40–129)
ALT SERPL-CCNC: 11 U/L (ref 10–40)
ANION GAP SERPL CALCULATED.3IONS-SCNC: 10 MMOL/L (ref 3–16)
APTT BLD: 31.1 SEC (ref 22.1–36.4)
APTT BLD: 31.4 SEC (ref 22.1–36.4)
AST SERPL-CCNC: 26 U/L (ref 15–37)
BASOPHILS # BLD: 0 K/UL (ref 0–0.2)
BASOPHILS NFR BLD: 0 %
BILIRUB DIRECT SERPL-MCNC: 0.2 MG/DL (ref 0–0.3)
BILIRUB INDIRECT SERPL-MCNC: 0.3 MG/DL (ref 0–1)
BILIRUB SERPL-MCNC: 0.5 MG/DL (ref 0–1)
BUN SERPL-MCNC: 9 MG/DL (ref 7–20)
CALCIUM SERPL-MCNC: 8.4 MG/DL (ref 8.3–10.6)
CHLORIDE SERPL-SCNC: 110 MMOL/L (ref 99–110)
CO2 SERPL-SCNC: 20 MMOL/L (ref 21–32)
CREAT SERPL-MCNC: 0.7 MG/DL (ref 0.8–1.3)
D-DIMER QUANTITATIVE: 2.86 UG/ML FEU (ref 0–0.6)
D-DIMER QUANTITATIVE: 3.41 UG/ML FEU (ref 0–0.6)
DEPRECATED RDW RBC AUTO: 16.2 % (ref 12.4–15.4)
EKG ATRIAL RATE: 101 BPM
EKG DIAGNOSIS: NORMAL
EKG P AXIS: 52 DEGREES
EKG P-R INTERVAL: 148 MS
EKG Q-T INTERVAL: 356 MS
EKG QRS DURATION: 86 MS
EKG QTC CALCULATION (BAZETT): 461 MS
EKG R AXIS: 22 DEGREES
EKG T AXIS: 68 DEGREES
EKG VENTRICULAR RATE: 101 BPM
EOSINOPHIL # BLD: 0 K/UL (ref 0–0.6)
EOSINOPHIL NFR BLD: 0 %
FIBRINOGEN PPP-MCNC: 267 MG/DL (ref 227–534)
FIBRINOGEN PPP-MCNC: 278 MG/DL (ref 227–534)
GFR SERPLBLD CREATININE-BSD FMLA CKD-EPI: >90 ML/MIN/{1.73_M2}
GLUCOSE SERPL-MCNC: 123 MG/DL (ref 70–99)
HCT VFR BLD AUTO: 23.4 % (ref 40.5–52.5)
HGB BLD-MCNC: 8.1 G/DL (ref 13.5–17.5)
INR PPP: 1.1 (ref 0.85–1.15)
INR PPP: 1.13 (ref 0.85–1.15)
LDH SERPL L TO P-CCNC: 222 U/L (ref 100–190)
LYMPHOCYTES # BLD: 0.5 K/UL (ref 1–5.1)
LYMPHOCYTES NFR BLD: 62 %
MACROCYTES BLD QL SMEAR: ABNORMAL
MAGNESIUM SERPL-MCNC: 2.07 MG/DL (ref 1.8–2.4)
MCH RBC QN AUTO: 35.5 PG (ref 26–34)
MCHC RBC AUTO-ENTMCNC: 34.5 G/DL (ref 31–36)
MCV RBC AUTO: 103 FL (ref 80–100)
MONOCYTES # BLD: 0 K/UL (ref 0–1.3)
MONOCYTES NFR BLD: 4 %
NEUTROPHILS # BLD: 0.2 K/UL (ref 1.7–7.7)
NEUTROPHILS NFR BLD: 28 %
NEUTS BAND NFR BLD MANUAL: 2 % (ref 0–7)
PATH INTERP BLD-IMP: NO
PHOSPHATE SERPL-MCNC: 3.2 MG/DL (ref 2.5–4.9)
PLATELET # BLD AUTO: 72 K/UL (ref 135–450)
PLATELET BLD QL SMEAR: ABNORMAL
PMV BLD AUTO: 8.7 FL (ref 5–10.5)
POTASSIUM SERPL-SCNC: 4.1 MMOL/L (ref 3.5–5.1)
PROT SERPL-MCNC: 6 G/DL (ref 6.4–8.2)
PROTHROMBIN TIME: 14.4 SEC (ref 11.9–14.9)
PROTHROMBIN TIME: 14.7 SEC (ref 11.9–14.9)
RBC # BLD AUTO: 2.27 M/UL (ref 4.2–5.9)
SLIDE REVIEW: ABNORMAL
SMUDGE CELLS BLD QL SMEAR: PRESENT
SODIUM SERPL-SCNC: 140 MMOL/L (ref 136–145)
URATE SERPL-MCNC: 2.8 MG/DL (ref 3.5–7.2)
VARIANT LYMPHS NFR BLD MANUAL: 4 % (ref 0–6)
WBC # BLD AUTO: 0.7 K/UL (ref 4–11)

## 2025-01-26 PROCEDURE — 84100 ASSAY OF PHOSPHORUS: CPT

## 2025-01-26 PROCEDURE — 2580000003 HC RX 258: Performed by: INTERNAL MEDICINE

## 2025-01-26 PROCEDURE — 85610 PROTHROMBIN TIME: CPT

## 2025-01-26 PROCEDURE — 2500000003 HC RX 250 WO HCPCS

## 2025-01-26 PROCEDURE — 85379 FIBRIN DEGRADATION QUANT: CPT

## 2025-01-26 PROCEDURE — 80076 HEPATIC FUNCTION PANEL: CPT

## 2025-01-26 PROCEDURE — 36592 COLLECT BLOOD FROM PICC: CPT

## 2025-01-26 PROCEDURE — 84550 ASSAY OF BLOOD/URIC ACID: CPT

## 2025-01-26 PROCEDURE — 83735 ASSAY OF MAGNESIUM: CPT

## 2025-01-26 PROCEDURE — 80048 BASIC METABOLIC PNL TOTAL CA: CPT

## 2025-01-26 PROCEDURE — 85384 FIBRINOGEN ACTIVITY: CPT

## 2025-01-26 PROCEDURE — 6370000000 HC RX 637 (ALT 250 FOR IP)

## 2025-01-26 PROCEDURE — 85730 THROMBOPLASTIN TIME PARTIAL: CPT

## 2025-01-26 PROCEDURE — 93010 ELECTROCARDIOGRAM REPORT: CPT | Performed by: INTERNAL MEDICINE

## 2025-01-26 PROCEDURE — 85025 COMPLETE CBC W/AUTO DIFF WBC: CPT

## 2025-01-26 PROCEDURE — 83615 LACTATE (LD) (LDH) ENZYME: CPT

## 2025-01-26 PROCEDURE — 2580000003 HC RX 258: Performed by: NURSE PRACTITIONER

## 2025-01-26 PROCEDURE — 6360000002 HC RX W HCPCS: Performed by: INTERNAL MEDICINE

## 2025-01-26 PROCEDURE — 6370000000 HC RX 637 (ALT 250 FOR IP): Performed by: STUDENT IN AN ORGANIZED HEALTH CARE EDUCATION/TRAINING PROGRAM

## 2025-01-26 PROCEDURE — 2060000000 HC ICU INTERMEDIATE R&B

## 2025-01-26 RX ORDER — HYDRALAZINE HYDROCHLORIDE 20 MG/ML
10 INJECTION INTRAMUSCULAR; INTRAVENOUS EVERY 4 HOURS PRN
Status: DISCONTINUED | OUTPATIENT
Start: 2025-01-26 | End: 2025-02-13 | Stop reason: HOSPADM

## 2025-01-26 RX ADMIN — FUROSEMIDE 40 MG: 10 INJECTION, SOLUTION INTRAMUSCULAR; INTRAVENOUS at 16:13

## 2025-01-26 RX ADMIN — LEVOFLOXACIN 500 MG: 500 TABLET, FILM COATED ORAL at 07:54

## 2025-01-26 RX ADMIN — LEVOTHYROXINE SODIUM 175 MCG: 150 TABLET ORAL at 06:38

## 2025-01-26 RX ADMIN — HYDROCORTISONE: 1 CREAM TOPICAL at 19:53

## 2025-01-26 RX ADMIN — HYDROCORTISONE: 1 CREAM TOPICAL at 07:53

## 2025-01-26 RX ADMIN — FLUCONAZOLE 200 MG: 200 TABLET ORAL at 07:54

## 2025-01-26 RX ADMIN — VALACYCLOVIR HYDROCHLORIDE 500 MG: 500 TABLET, FILM COATED ORAL at 19:48

## 2025-01-26 RX ADMIN — ARSENIC TRIOXIDE 13 MG: 1 INJECTION, SOLUTION INTRAVENOUS at 12:25

## 2025-01-26 RX ADMIN — SODIUM CHLORIDE, PRESERVATIVE FREE 10 ML: 5 INJECTION INTRAVENOUS at 19:49

## 2025-01-26 RX ADMIN — DONEPEZIL HYDROCHLORIDE 10 MG: 10 TABLET ORAL at 07:53

## 2025-01-26 RX ADMIN — TRETINOIN 50 MG: 10 CAPSULE ORAL at 16:06

## 2025-01-26 RX ADMIN — CETIRIZINE HYDROCHLORIDE 10 MG: 10 TABLET, FILM COATED ORAL at 19:48

## 2025-01-26 RX ADMIN — TROSPIUM CHLORIDE 20 MG: 20 TABLET, FILM COATED ORAL at 06:38

## 2025-01-26 RX ADMIN — SODIUM CHLORIDE: 9 INJECTION, SOLUTION INTRAVENOUS at 12:27

## 2025-01-26 RX ADMIN — QUETIAPINE FUMARATE 50 MG: 25 TABLET ORAL at 19:48

## 2025-01-26 RX ADMIN — TRETINOIN 50 MG: 10 CAPSULE ORAL at 07:54

## 2025-01-26 RX ADMIN — TROSPIUM CHLORIDE 20 MG: 20 TABLET, FILM COATED ORAL at 16:05

## 2025-01-26 RX ADMIN — SODIUM CHLORIDE, PRESERVATIVE FREE 10 ML: 5 INJECTION INTRAVENOUS at 07:54

## 2025-01-26 RX ADMIN — SODIUM CHLORIDE, PRESERVATIVE FREE 10 ML: 5 INJECTION INTRAVENOUS at 12:27

## 2025-01-26 RX ADMIN — VALACYCLOVIR HYDROCHLORIDE 500 MG: 500 TABLET, FILM COATED ORAL at 07:54

## 2025-01-26 ASSESSMENT — PAIN DESCRIPTION - LOCATION: LOCATION: HEAD

## 2025-01-26 ASSESSMENT — PAIN - FUNCTIONAL ASSESSMENT: PAIN_FUNCTIONAL_ASSESSMENT: ACTIVITIES ARE NOT PREVENTED

## 2025-01-26 ASSESSMENT — PAIN SCALES - GENERAL
PAINLEVEL_OUTOF10: 0
PAINLEVEL_OUTOF10: 0
PAINLEVEL_OUTOF10: 5

## 2025-01-26 ASSESSMENT — PAIN DESCRIPTION - DESCRIPTORS: DESCRIPTORS: ACHING

## 2025-01-26 NOTE — ONCOLOGY
Administration: Chemotherapy drug arsenic independently verified with Radha Tidwell RN prior to administration.  Acknowledgement of informed consent for chemotherapy administration verified.  Original order, appropriateness of regimen, drug supplied, height, weight, BSA, dose calculations, expiration dates/times, drug appearance, and two patient identifiers were verified by both RNs.  Drug checked for vesicant/irritant status and for risk of hypersensitivity.  Most recent laboratory values and allergies, were reviewed.  Positive, brisk blood return via CVC was confirmed prior to administration. Chest x-ray for correct line placement reviewed. Dorothea Hoang RN and Radha Tidwell RN verified correct rate of chemotherapy and maintenance IV fluids.  Patient was educated on chemotherapy regimen prior to administration including indication for treatment related to disease & side effects of chemotherapy drug.  Patient verbalizes understanding of all instructions.     Completion of Chemotherapy: Monitoring during infusion done per policy, see Flowsheets.  Blood return verified before, during, and after infusion per policy; no signs of extravasation.  Pt tolerated chemotherapy well and without incident.  Chemotherapy infusion end time on the MAR.

## 2025-01-26 NOTE — PROGRESS NOTES
[facility] Progress Note    2025     Karan Fortune    MRN: [id number]    : 1950    Referring MD: No referring provider defined for this encounter.      SUBJECTIVE:    -Tolerating chemotherapy well.          ECOG PS:  (2) Ambulatory and capable of self care, unable to carry out work activity, up and about > 50% or waking hours    KPS: 60% Requires occasional assistance, but is able to care for most of his personal needs    Isolation: None    Medications    Scheduled Meds:   hydrocortisone   Topical BID    arsenic trioxide (TRISENOX) 13 mg in sodium chloride 0.9 % 250 mL IVPB  13 mg IntraVENous Q24H    furosemide  40 mg IntraVENous Q12H    acetaminophen  650 mg Oral Once    trospium  20 mg Oral BID AC    QUEtiapine  50 mg Oral Nightly    cetirizine  10 mg Oral Nightly    donepezil  10 mg Oral Daily    sodium chloride flush  5-40 mL IntraVENous 2 times per day    Saline Mouthwash  15 mL Swish & Spit 4x Daily AC & HS    valACYclovir  500 mg Oral BID    fluconazole  200 mg Oral Daily    levoFLOXacin  500 mg Oral Daily    [Held by provider] allopurinol  300 mg Oral Daily    sodium chloride flush  5-40 mL IntraVENous 2 times per day    tretinoin  22.5 mg/m2 Oral Daily with breakfast    And    tretinoin  22.5 mg/m2 Oral Dinner    levothyroxine  175 mcg Oral Daily     Continuous Infusions:   sodium chloride      sodium chloride      sodium chloride      sodium chloride      sodium chloride 50 mL/hr at 25    sodium chloride       PRN Meds:.sodium chloride, melatonin, magnesium sulfate, prochlorperazine **OR** prochlorperazine, potassium chloride, sodium chloride, traMADol, sodium chloride, sodium chloride flush, sodium chloride, ALTEplase (CATHFLO) 2 mg in sterile water 2 mL injection, sodium chloride flush, sodium chloride    ROS:  As noted above, otherwise remainder of 10-point ROS negative    Physical Exam:     I&O:    Intake/Output Summary (Last 24 hours) at 2025 0805  Last data filed at  1/26/2025 0634  Gross per 24 hour   Intake 2400.38 ml   Output 1750 ml   Net 650.38 ml       Vital Signs:  BP (!) 168/73   Pulse 81   Temp 98.5 °F (36.9 °C) (Oral)   Resp 17   Ht 1.658 m (5' 5.28\") Comment: measured height  Wt 92.3 kg (203 lb 6.4 oz)   SpO2 96%   BMI 33.56 kg/m²     Weight:    Wt Readings from Last 3 Encounters:   01/26/25 92.3 kg (203 lb 6.4 oz)   09/29/24 93.6 kg (206 lb 6.4 oz)   08/26/19 82.6 kg (182 lb 1.6 oz)         General: Awake, alert and oriented.  HEENT: normocephalic, PERRL, no scleral erythema or icterus, Oral mucosa moist and intact, throat clear  NECK: supple  BACK: Straight   SKIN: warm dry and intact without lesions rashes or masses  CHEST: CTA bilaterally without use of accessory muscles  CV: Normal S1 S2, RRR, no MRG  ABD: NT ND normoactive BS  EXTREMITIES: without edema, denies calf tenderness  NEURO: CN II - XII grossly intact  CATHETER: LUE PICC    Data    CBC:   Recent Labs     01/24/25 0415 01/25/25 0305 01/26/25  0359   WBC 0.5* 0.5* 0.7*   HGB 7.8* 7.3* 8.1*   HCT 22.3* 21.4* 23.4*   .7* 102.3* 103.0*   PLT 57* 50* 72*     BMP/Mag:  Recent Labs     01/24/25 0415 01/25/25 0305 01/25/25  1842 01/26/25  0359    136 140 140   K 3.7 4.1 3.8 4.1    105 108 110   CO2 23 21 22 20*   PHOS 2.6 3.2  --  3.2   BUN 14 12 13 9   CREATININE 0.7* 0.8 0.8 0.7*   MG 1.90 2.21 2.07 2.07     LIVP:   Recent Labs     01/24/25  0415 01/25/25  0305 01/26/25  0359   AST 20 22 26   ALT 7* 7* 11   BILIDIR 0.2 0.2 0.2   BILITOT 0.5 0.5 0.5   ALKPHOS 58 61 72     Coags:   Recent Labs     01/24/25  0847 01/24/25 2103 01/25/25 2043   PROTIME 14.3 14.4 13.9   INR 1.09 1.10 1.05   APTT 29.3 30.9 37.4*     Uric Acid No results for input(s): \"LABURIC\" in the last 72 hours.    PROBLEM LIST:           APL (dx 1/2025)   Post ablative hypothyroidism   Thyroid cancer s/p thyroidectomy   Lewy body dementia   Sensorineural hearing loss bilaterally   Cervical DJD   Hypersomnolence

## 2025-01-26 NOTE — PLAN OF CARE
Problem: Safety - Adult  Goal: Free from fall injury  1/26/2025 1546 by Wendy Sims RN  Outcome: Progressing  Flowsheets (Taken 1/24/2025 1617 by Clay Vu, RN)  Free From Fall Injury: Instruct family/caregiver on patient safety  Note: + High Fall Risk per DALE/ABCDS: Explained fall risk precautions to patient and family and rationale behind their use to keep the patient safe. Patient's bed is in low position, side rails up x2, call light and belongings are in reach. Chair wheels locked. Sitter at bedside. Chair Alarm on. Encouraged patient to call for assistance as needed.  1/26/2025 0704 by Alondra Kaufman, RN  Outcome: Progressing  Note: Patient remained free of falls during shift. Patient remains a Dale Fall Risk: High (45 and higher). Patient makes no attempt to get out of bed without help from hospital staff. Will continue to encourage call light usage prior to ambulating. Call light within reach and hourly rounding in place.      Problem: ABCDS Injury Assessment  Goal: Absence of physical injury  Outcome: Progressing  Flowsheets (Taken 1/22/2025 0812 by Barbara Bueno, RN)  Absence of Physical Injury: Implement safety measures based on patient assessment     Problem: Neurosensory - Adult  Goal: Achieves stable or improved neurological status  1/26/2025 1546 by Wendy Sims RN  Outcome: Progressing  Flowsheets (Taken 1/24/2025 1617 by Clay Vu, RN)  Achieves stable or improved neurological status: Assess for and report changes in neurological status  1/26/2025 0704 by Alondra Kaufman, RN  Outcome: Progressing  Note: Patient remains to be intermittently confused to varying orientation questions such as place and birth date.     Problem: Respiratory - Adult  Goal: Achieves optimal ventilation and oxygenation  Outcome: Progressing  Flowsheets (Taken 1/26/2025 1546)  Achieves optimal ventilation and oxygenation:   Assess for changes in respiratory status   Assess for changes in

## 2025-01-26 NOTE — PLAN OF CARE
Problem: Safety - Adult  Goal: Free from fall injury  Outcome: Progressing  Note: Patient remained free of falls during shift. Patient remains a Albright Fall Risk: High (45 and higher). Patient makes no attempt to get out of bed without help from hospital staff. Will continue to encourage call light usage prior to ambulating. Call light within reach and hourly rounding in place.      Problem: Neurosensory - Adult  Goal: Achieves stable or improved neurological status  Outcome: Progressing  Note: Patient remains to be intermittently confused to varying orientation questions such as place and birth date.     Problem: Gastrointestinal - Adult  Goal: Minimal or absence of nausea and vomiting  Outcome: Progressing  Note: Patient has not experienced nausea during shift. Will continue to assess and implement POC. Call light within reach and hourly rounding in place.      Problem: Genitourinary - Adult  Goal: Absence of urinary retention  Outcome: Progressing     Problem: Infection - Adult  Goal: Absence of infection during hospitalization  Note: Patient is showing no signs or symptoms of nosocomial infections. Patient's temp during shift are as follows: Temp (8hrs), Av.9 °F (37.2 °C), Min:98.8 °F (37.1 °C), Max:98.9 °F (37.2 °C)  . Patient placed in private room and instructed on importance of handwashing and when to wear a mask when ambulating in hallway. Will continue to assess for s/s of infection and implement POC. Call light within reach and hourly rounding in place.      Problem: Metabolic/Fluid and Electrolytes - Adult  Goal: Electrolytes maintained within normal limits  Outcome: Progressing  Note: Electrolytes WNL this am.     Problem: Hematologic - Adult  Goal: Maintains hematologic stability  Outcome: Progressing  Note: Patient's hemoglobin this AM:   Recent Labs     25  0359   HGB 8.1*     Patient's platelet count this AM:   Recent Labs     25  0359   PLT 72*    Thrombocytopenia Precautions in

## 2025-01-27 ENCOUNTER — APPOINTMENT (OUTPATIENT)
Dept: GENERAL RADIOLOGY | Age: 75
DRG: 835 | End: 2025-01-27
Payer: MEDICARE

## 2025-01-27 LAB
ALBUMIN SERPL-MCNC: 3.7 G/DL (ref 3.4–5)
ALP SERPL-CCNC: 71 U/L (ref 40–129)
ALT SERPL-CCNC: 14 U/L (ref 10–40)
ANION GAP SERPL CALCULATED.3IONS-SCNC: 11 MMOL/L (ref 3–16)
ANISOCYTOSIS BLD QL SMEAR: ABNORMAL
APTT BLD: 32.9 SEC (ref 22.1–36.4)
AST SERPL-CCNC: 29 U/L (ref 15–37)
BASOPHILS # BLD: 0 K/UL (ref 0–0.2)
BASOPHILS NFR BLD: 0 %
BILIRUB DIRECT SERPL-MCNC: 0.2 MG/DL (ref 0–0.3)
BILIRUB INDIRECT SERPL-MCNC: 0.4 MG/DL (ref 0–1)
BILIRUB SERPL-MCNC: 0.6 MG/DL (ref 0–1)
BUN SERPL-MCNC: 9 MG/DL (ref 7–20)
CALCIUM SERPL-MCNC: 8.3 MG/DL (ref 8.3–10.6)
CHLORIDE SERPL-SCNC: 105 MMOL/L (ref 99–110)
CO2 SERPL-SCNC: 23 MMOL/L (ref 21–32)
CREAT SERPL-MCNC: 0.7 MG/DL (ref 0.8–1.3)
D-DIMER QUANTITATIVE: 2.4 UG/ML FEU (ref 0–0.6)
DACRYOCYTES BLD QL SMEAR: ABNORMAL
DEPRECATED RDW RBC AUTO: 16.2 % (ref 12.4–15.4)
EKG ATRIAL RATE: 97 BPM
EKG DIAGNOSIS: NORMAL
EKG P AXIS: 50 DEGREES
EKG P-R INTERVAL: 156 MS
EKG Q-T INTERVAL: 348 MS
EKG QRS DURATION: 78 MS
EKG QTC CALCULATION (BAZETT): 441 MS
EKG R AXIS: 5 DEGREES
EKG T AXIS: 28 DEGREES
EKG VENTRICULAR RATE: 97 BPM
EOSINOPHIL # BLD: 0 K/UL (ref 0–0.6)
EOSINOPHIL NFR BLD: 1 %
FIBRINOGEN PPP-MCNC: 208 MG/DL (ref 227–534)
GFR SERPLBLD CREATININE-BSD FMLA CKD-EPI: >90 ML/MIN/{1.73_M2}
GLUCOSE SERPL-MCNC: 120 MG/DL (ref 70–99)
HCT VFR BLD AUTO: 22.5 % (ref 40.5–52.5)
HGB BLD-MCNC: 7.8 G/DL (ref 13.5–17.5)
INR PPP: 1.12 (ref 0.85–1.15)
LDH SERPL L TO P-CCNC: 220 U/L (ref 100–190)
LYMPHOCYTES # BLD: 0.5 K/UL (ref 1–5.1)
LYMPHOCYTES NFR BLD: 58 %
MACROCYTES BLD QL SMEAR: ABNORMAL
MAGNESIUM SERPL-MCNC: 1.92 MG/DL (ref 1.8–2.4)
MCH RBC QN AUTO: 35.3 PG (ref 26–34)
MCHC RBC AUTO-ENTMCNC: 34.7 G/DL (ref 31–36)
MCV RBC AUTO: 101.6 FL (ref 80–100)
METAMYELOCYTES NFR BLD MANUAL: 3 %
MICROCYTES BLD QL SMEAR: ABNORMAL
MONOCYTES # BLD: 0.1 K/UL (ref 0–1.3)
MONOCYTES NFR BLD: 8 %
MYELOCYTES NFR BLD MANUAL: 2 %
NEUTROPHILS # BLD: 0.3 K/UL (ref 1.7–7.7)
NEUTROPHILS NFR BLD: 28 %
NRBC BLD-RTO: 5 /100 WBC
OVALOCYTES BLD QL SMEAR: ABNORMAL
PHOSPHATE SERPL-MCNC: 3.5 MG/DL (ref 2.5–4.9)
PLATELET # BLD AUTO: 62 K/UL (ref 135–450)
PLATELET BLD QL SMEAR: ABNORMAL
PMV BLD AUTO: 9.8 FL (ref 5–10.5)
POLYCHROMASIA BLD QL SMEAR: ABNORMAL
POTASSIUM SERPL-SCNC: 3.3 MMOL/L (ref 3.5–5.1)
PROT SERPL-MCNC: 5.9 G/DL (ref 6.4–8.2)
PROTHROMBIN TIME: 14.6 SEC (ref 11.9–14.9)
RBC # BLD AUTO: 2.22 M/UL (ref 4.2–5.9)
SCHISTOCYTES BLD QL SMEAR: ABNORMAL
SODIUM SERPL-SCNC: 139 MMOL/L (ref 136–145)
TARGETS BLD QL SMEAR: ABNORMAL
URATE SERPL-MCNC: 3.1 MG/DL (ref 3.5–7.2)
WBC # BLD AUTO: 0.8 K/UL (ref 4–11)

## 2025-01-27 PROCEDURE — 85730 THROMBOPLASTIN TIME PARTIAL: CPT

## 2025-01-27 PROCEDURE — 80048 BASIC METABOLIC PNL TOTAL CA: CPT

## 2025-01-27 PROCEDURE — 71045 X-RAY EXAM CHEST 1 VIEW: CPT

## 2025-01-27 PROCEDURE — 2580000003 HC RX 258: Performed by: INTERNAL MEDICINE

## 2025-01-27 PROCEDURE — 2580000003 HC RX 258: Performed by: NURSE PRACTITIONER

## 2025-01-27 PROCEDURE — 84100 ASSAY OF PHOSPHORUS: CPT

## 2025-01-27 PROCEDURE — 85379 FIBRIN DEGRADATION QUANT: CPT

## 2025-01-27 PROCEDURE — 93010 ELECTROCARDIOGRAM REPORT: CPT | Performed by: INTERNAL MEDICINE

## 2025-01-27 PROCEDURE — 80076 HEPATIC FUNCTION PANEL: CPT

## 2025-01-27 PROCEDURE — 84550 ASSAY OF BLOOD/URIC ACID: CPT

## 2025-01-27 PROCEDURE — 2500000003 HC RX 250 WO HCPCS

## 2025-01-27 PROCEDURE — 6370000000 HC RX 637 (ALT 250 FOR IP): Performed by: STUDENT IN AN ORGANIZED HEALTH CARE EDUCATION/TRAINING PROGRAM

## 2025-01-27 PROCEDURE — 85384 FIBRINOGEN ACTIVITY: CPT

## 2025-01-27 PROCEDURE — 85610 PROTHROMBIN TIME: CPT

## 2025-01-27 PROCEDURE — 6360000002 HC RX W HCPCS: Performed by: INTERNAL MEDICINE

## 2025-01-27 PROCEDURE — 36592 COLLECT BLOOD FROM PICC: CPT

## 2025-01-27 PROCEDURE — 85025 COMPLETE CBC W/AUTO DIFF WBC: CPT

## 2025-01-27 PROCEDURE — 6370000000 HC RX 637 (ALT 250 FOR IP)

## 2025-01-27 PROCEDURE — 2060000000 HC ICU INTERMEDIATE R&B

## 2025-01-27 PROCEDURE — 6360000002 HC RX W HCPCS: Performed by: NURSE PRACTITIONER

## 2025-01-27 PROCEDURE — 93005 ELECTROCARDIOGRAM TRACING: CPT | Performed by: INTERNAL MEDICINE

## 2025-01-27 PROCEDURE — 83615 LACTATE (LD) (LDH) ENZYME: CPT

## 2025-01-27 PROCEDURE — 83735 ASSAY OF MAGNESIUM: CPT

## 2025-01-27 RX ADMIN — HYDROCORTISONE: 1 CREAM TOPICAL at 09:27

## 2025-01-27 RX ADMIN — HYDROCORTISONE: 1 CREAM TOPICAL at 20:21

## 2025-01-27 RX ADMIN — TRETINOIN 50 MG: 10 CAPSULE ORAL at 09:05

## 2025-01-27 RX ADMIN — TRETINOIN 50 MG: 10 CAPSULE ORAL at 16:34

## 2025-01-27 RX ADMIN — POTASSIUM CHLORIDE 20 MEQ: 400 INJECTION, SOLUTION INTRAVENOUS at 05:32

## 2025-01-27 RX ADMIN — SODIUM CHLORIDE 15 ML: 900 IRRIGANT IRRIGATION at 20:23

## 2025-01-27 RX ADMIN — MAGNESIUM SULFATE HEPTAHYDRATE 4000 MG: 40 INJECTION, SOLUTION INTRAVENOUS at 05:33

## 2025-01-27 RX ADMIN — LEVOFLOXACIN 500 MG: 500 TABLET, FILM COATED ORAL at 09:04

## 2025-01-27 RX ADMIN — SODIUM CHLORIDE: 9 INJECTION, SOLUTION INTRAVENOUS at 02:56

## 2025-01-27 RX ADMIN — SODIUM CHLORIDE, PRESERVATIVE FREE 10 ML: 5 INJECTION INTRAVENOUS at 09:07

## 2025-01-27 RX ADMIN — CETIRIZINE HYDROCHLORIDE 10 MG: 10 TABLET, FILM COATED ORAL at 20:18

## 2025-01-27 RX ADMIN — TROSPIUM CHLORIDE 20 MG: 20 TABLET, FILM COATED ORAL at 16:33

## 2025-01-27 RX ADMIN — POTASSIUM CHLORIDE 20 MEQ: 400 INJECTION, SOLUTION INTRAVENOUS at 06:33

## 2025-01-27 RX ADMIN — FLUCONAZOLE 200 MG: 200 TABLET ORAL at 09:04

## 2025-01-27 RX ADMIN — POTASSIUM CHLORIDE 20 MEQ: 400 INJECTION, SOLUTION INTRAVENOUS at 07:32

## 2025-01-27 RX ADMIN — VALACYCLOVIR HYDROCHLORIDE 500 MG: 500 TABLET, FILM COATED ORAL at 09:04

## 2025-01-27 RX ADMIN — SODIUM CHLORIDE: 9 INJECTION, SOLUTION INTRAVENOUS at 20:15

## 2025-01-27 RX ADMIN — SODIUM CHLORIDE, PRESERVATIVE FREE 10 ML: 5 INJECTION INTRAVENOUS at 09:27

## 2025-01-27 RX ADMIN — QUETIAPINE FUMARATE 50 MG: 25 TABLET ORAL at 20:18

## 2025-01-27 RX ADMIN — TROSPIUM CHLORIDE 20 MG: 20 TABLET, FILM COATED ORAL at 05:25

## 2025-01-27 RX ADMIN — DONEPEZIL HYDROCHLORIDE 10 MG: 10 TABLET ORAL at 09:05

## 2025-01-27 RX ADMIN — ARSENIC TRIOXIDE 13 MG: 1 INJECTION, SOLUTION INTRAVENOUS at 12:57

## 2025-01-27 RX ADMIN — POTASSIUM CHLORIDE 20 MEQ: 400 INJECTION, SOLUTION INTRAVENOUS at 09:02

## 2025-01-27 RX ADMIN — LEVOTHYROXINE SODIUM 175 MCG: 150 TABLET ORAL at 05:25

## 2025-01-27 RX ADMIN — VALACYCLOVIR HYDROCHLORIDE 500 MG: 500 TABLET, FILM COATED ORAL at 20:18

## 2025-01-27 RX ADMIN — SODIUM CHLORIDE, PRESERVATIVE FREE 10 ML: 5 INJECTION INTRAVENOUS at 20:18

## 2025-01-27 RX ADMIN — TRAMADOL HYDROCHLORIDE 50 MG: 50 TABLET, COATED ORAL at 05:25

## 2025-01-27 ASSESSMENT — PAIN SCALES - GENERAL: PAINLEVEL_OUTOF10: 8

## 2025-01-27 NOTE — PLAN OF CARE
Problem: Safety - Adult  Goal: Free from fall injury  Outcome: Progressing  Note: High Fall Risk per DALE/ABCDS: Explained fall risk precautions to pt and family and rationale behind their use to keep the patient safe. Pt bed is in low position, side rails up, call light and belongings are in reach. Fall wristband applied and present on pts wrist.  Bed alarm on.  Pt encouraged to call for assistance. Will continue with hourly rounds for PO intake, pain needs, toileting and repositioning as needed.      Problem: Neurosensory - Adult  Goal: Achieves stable or improved neurological status  Outcome: Progressing  Note: Patient continues to be confused throughout the night.      Problem: Respiratory - Adult  Goal: Achieves optimal ventilation and oxygenation  Outcome: Progressing  Note: Pt remains on room air.      Problem: Gastrointestinal - Adult  Goal: Minimal or absence of nausea and vomiting  Outcome: Progressing  Note: Pt denies nausea.      Problem: Infection - Adult  Goal: Absence of infection at discharge  Outcome: Progressing  Note: Pt continues to be afebrile throughout shift.      Problem: Hematologic - Adult  Goal: Maintains hematologic stability  Outcome: Progressing  Note: Patient's hemoglobin this AM:   Recent Labs     01/27/25  0300   HGB 7.8*     Patient's platelet count this AM:   Recent Labs     01/27/25  0300   PLT 62*    Thrombocytopenia Precautions in place.  Patient showing no signs or symptoms of active bleeding.  Transfusion not indicated at this time.  Patient verbalizes understanding of all instructions. Will continue to assess and implement POC. Call light within reach and hourly rounding in place.       Problem: Pain  Goal: Verbalizes/displays adequate comfort level or baseline comfort level  Outcome: Progressing  Note: Pt reports a headache, tramadol administered.

## 2025-01-27 NOTE — PROGRESS NOTES
[facility] Progress Note    2025     Karan Fortune    MRN: [id number]    : 1950    Referring MD: No referring provider defined for this encounter.      SUBJECTIVE:    -Tolerating chemotherapy well.   Having confusion and delusions at night.  Will ask Neurology whether there is a better alternative for sleep in this demented gentleman.         ECOG PS:  (2) Ambulatory and capable of self care, unable to carry out work activity, up and about > 50% or waking hours    KPS: 60% Requires occasional assistance, but is able to care for most of his personal needs    Isolation: None    Medications    Scheduled Meds:   hydrocortisone   Topical BID    arsenic trioxide (TRISENOX) 13 mg in sodium chloride 0.9 % 250 mL IVPB  13 mg IntraVENous Q24H    furosemide  40 mg IntraVENous Q12H    acetaminophen  650 mg Oral Once    trospium  20 mg Oral BID AC    QUEtiapine  50 mg Oral Nightly    cetirizine  10 mg Oral Nightly    donepezil  10 mg Oral Daily    sodium chloride flush  5-40 mL IntraVENous 2 times per day    Saline Mouthwash  15 mL Swish & Spit 4x Daily AC & HS    valACYclovir  500 mg Oral BID    fluconazole  200 mg Oral Daily    levoFLOXacin  500 mg Oral Daily    [Held by provider] allopurinol  300 mg Oral Daily    sodium chloride flush  5-40 mL IntraVENous 2 times per day    tretinoin  22.5 mg/m2 Oral Daily with breakfast    And    tretinoin  22.5 mg/m2 Oral Dinner    levothyroxine  175 mcg Oral Daily     Continuous Infusions:   sodium chloride      sodium chloride      sodium chloride      sodium chloride      sodium chloride 50 mL/hr at 25 0256    sodium chloride       PRN Meds:.hydrALAZINE, sodium chloride, melatonin, magnesium sulfate, prochlorperazine **OR** prochlorperazine, potassium chloride, sodium chloride, traMADol, sodium chloride, sodium chloride flush, sodium chloride, ALTEplase (CATHFLO) 2 mg in sterile water 2 mL injection, sodium chloride flush, sodium chloride    ROS:  As noted above,

## 2025-01-27 NOTE — ONCOLOGY
Administration: Chemotherapy drug Arsenic independently verified with Savita Arechiga RN prior to administration.  Acknowledgement of informed consent for chemotherapy administration verified.  Original order, appropriateness of regimen, drug supplied, height, weight, BSA, dose calculations, expiration dates/times, drug appearance, and two patient identifiers were verified by both RNs.  Drug checked for vesicant/irritant status and for risk of hypersensitivity.  Most recent laboratory values and allergies, were reviewed.  Positive, brisk blood return via CVC was confirmed prior to administration. Chest x-ray for correct line placement reviewed. Kavya Mccurdy RN and Savita Arechiga RN verified correct rate of chemotherapy and maintenance IV fluids.  Patient was educated on chemotherapy regimen prior to administration including indication for treatment related to disease & side effects of chemotherapy drug.  Patient verbalizes understanding of all instructions.    Completion of Chemotherapy: Monitoring during infusion done per policy, see Flowsheets.  Blood return verified before, during, and after infusion per policy; no signs of extravasation.  Pt tolerated chemotherapy well and without incident.  Chemotherapy infusion end time on the MAR.

## 2025-01-27 NOTE — PLAN OF CARE
Problem: Safety - Adult  Goal: Free from fall injury  1/27/2025 1429 by Kavya Mccurdy, RN  Outcome: Progressing     Problem: ABCDS Injury Assessment  Goal: Absence of physical injury  Outcome: Progressing     Problem: Respiratory - Adult  Goal: Achieves optimal ventilation and oxygenation  1/27/2025 1429 by Kavya Mccurdy, RN  Outcome: Progressing

## 2025-01-27 NOTE — CARE COORDINATION
Discussed plan of care with treatment team during rounds. Alyssa TODD ordered PT/OT.     Pt plans on returning home with his wife when able.     SW will follow    Geraldine DE LA O, PARVIN   for [facility] ([facility])  Roxana Mobile: [phone]

## 2025-01-27 NOTE — PROGRESS NOTES
Patient seen and assessed for standard line care needs. CVC site remains free of visible signs and symptoms of infection. No drainage, edema, erythema, pain, itching, or warmth noted at and around the insertion site. Line care performed by Telma Hallman RN. The need for continued use of the CVC is due to ongoing therapy. Patient verbalized understanding of the line care education provided by line care RN. Staff RNs will continue to monitor and assess the CVC site throughout the patient's hospital stay. Sterile dressing changes will continue to be changed per policy.

## 2025-01-28 LAB
ALBUMIN SERPL-MCNC: 3.7 G/DL (ref 3.4–5)
ALP SERPL-CCNC: 74 U/L (ref 40–129)
ALT SERPL-CCNC: 18 U/L (ref 10–40)
ANION GAP SERPL CALCULATED.3IONS-SCNC: 11 MMOL/L (ref 3–16)
APTT BLD: 34.3 SEC (ref 22.1–36.4)
APTT BLD: 35 SEC (ref 22.1–36.4)
AST SERPL-CCNC: 36 U/L (ref 15–37)
BASOPHILS # BLD: 0 K/UL (ref 0–0.2)
BASOPHILS NFR BLD: 0 %
BILIRUB DIRECT SERPL-MCNC: 0.3 MG/DL (ref 0–0.3)
BILIRUB INDIRECT SERPL-MCNC: 0.3 MG/DL (ref 0–1)
BILIRUB SERPL-MCNC: 0.6 MG/DL (ref 0–1)
BILIRUB UR QL STRIP.AUTO: NEGATIVE
BUN SERPL-MCNC: 8 MG/DL (ref 7–20)
CALCIUM SERPL-MCNC: 8.4 MG/DL (ref 8.3–10.6)
CHLORIDE SERPL-SCNC: 106 MMOL/L (ref 99–110)
CLARITY UR: CLEAR
CO2 SERPL-SCNC: 22 MMOL/L (ref 21–32)
COLOR UR: YELLOW
CREAT SERPL-MCNC: 0.6 MG/DL (ref 0.8–1.3)
D-DIMER QUANTITATIVE: 2.34 UG/ML FEU (ref 0–0.6)
D-DIMER QUANTITATIVE: 2.4 UG/ML FEU (ref 0–0.6)
DEPRECATED RDW RBC AUTO: 16.5 % (ref 12.4–15.4)
EOSINOPHIL # BLD: 0 K/UL (ref 0–0.6)
EOSINOPHIL NFR BLD: 0 %
EPI CELLS #/AREA URNS HPF: NORMAL /HPF (ref 0–5)
FIBRINOGEN PPP-MCNC: 233 MG/DL (ref 227–534)
FIBRINOGEN PPP-MCNC: 238 MG/DL (ref 227–534)
GFR SERPLBLD CREATININE-BSD FMLA CKD-EPI: >90 ML/MIN/{1.73_M2}
GLUCOSE SERPL-MCNC: 120 MG/DL (ref 70–99)
GLUCOSE UR STRIP.AUTO-MCNC: NEGATIVE MG/DL
HCT VFR BLD AUTO: 23.1 % (ref 40.5–52.5)
HGB BLD-MCNC: 8 G/DL (ref 13.5–17.5)
HGB UR QL STRIP.AUTO: ABNORMAL
INR PPP: 1.09 (ref 0.85–1.15)
INR PPP: 1.15 (ref 0.85–1.15)
KETONES UR STRIP.AUTO-MCNC: NEGATIVE MG/DL
LDH SERPL L TO P-CCNC: 228 U/L (ref 100–190)
LEUKOCYTE ESTERASE UR QL STRIP.AUTO: NEGATIVE
LYMPHOCYTES # BLD: 0.4 K/UL (ref 1–5.1)
LYMPHOCYTES NFR BLD: 40 %
MAGNESIUM SERPL-MCNC: 2.25 MG/DL (ref 1.8–2.4)
MCH RBC QN AUTO: 35.6 PG (ref 26–34)
MCHC RBC AUTO-ENTMCNC: 34.7 G/DL (ref 31–36)
MCV RBC AUTO: 102.4 FL (ref 80–100)
METAMYELOCYTES NFR BLD MANUAL: 8 %
MONOCYTES # BLD: 0 K/UL (ref 0–1.3)
MONOCYTES NFR BLD: 2 %
MYELOCYTES NFR BLD MANUAL: 2 %
NEUTROPHILS # BLD: 0.6 K/UL (ref 1.7–7.7)
NEUTROPHILS NFR BLD: 44 %
NEUTS BAND NFR BLD MANUAL: 4 % (ref 0–7)
NITRITE UR QL STRIP.AUTO: NEGATIVE
PH UR STRIP.AUTO: 6 [PH] (ref 5–8)
PHOSPHATE SERPL-MCNC: 3.2 MG/DL (ref 2.5–4.9)
PLATELET # BLD AUTO: 61 K/UL (ref 135–450)
PMV BLD AUTO: 9.1 FL (ref 5–10.5)
POTASSIUM SERPL-SCNC: 3.8 MMOL/L (ref 3.5–5.1)
PROT SERPL-MCNC: 6 G/DL (ref 6.4–8.2)
PROT UR STRIP.AUTO-MCNC: NEGATIVE MG/DL
PROTHROMBIN TIME: 14.3 SEC (ref 11.9–14.9)
PROTHROMBIN TIME: 14.9 SEC (ref 11.9–14.9)
RBC # BLD AUTO: 2.26 M/UL (ref 4.2–5.9)
RBC #/AREA URNS HPF: NORMAL /HPF (ref 0–4)
SODIUM SERPL-SCNC: 139 MMOL/L (ref 136–145)
SP GR UR STRIP.AUTO: 1.01 (ref 1–1.03)
UA DIPSTICK W REFLEX MICRO PNL UR: YES
URATE SERPL-MCNC: 2.9 MG/DL (ref 3.5–7.2)
URN SPEC COLLECT METH UR: ABNORMAL
UROBILINOGEN UR STRIP-ACNC: 0.2 E.U./DL
WBC # BLD AUTO: 1 K/UL (ref 4–11)
WBC #/AREA URNS HPF: NORMAL /HPF (ref 0–5)

## 2025-01-28 PROCEDURE — 83615 LACTATE (LD) (LDH) ENZYME: CPT

## 2025-01-28 PROCEDURE — 81001 URINALYSIS AUTO W/SCOPE: CPT

## 2025-01-28 PROCEDURE — 2580000003 HC RX 258: Performed by: INTERNAL MEDICINE

## 2025-01-28 PROCEDURE — 2580000003 HC RX 258: Performed by: NURSE PRACTITIONER

## 2025-01-28 PROCEDURE — 6370000000 HC RX 637 (ALT 250 FOR IP)

## 2025-01-28 PROCEDURE — 85025 COMPLETE CBC W/AUTO DIFF WBC: CPT

## 2025-01-28 PROCEDURE — 85384 FIBRINOGEN ACTIVITY: CPT

## 2025-01-28 PROCEDURE — 97530 THERAPEUTIC ACTIVITIES: CPT

## 2025-01-28 PROCEDURE — 97166 OT EVAL MOD COMPLEX 45 MIN: CPT

## 2025-01-28 PROCEDURE — 80048 BASIC METABOLIC PNL TOTAL CA: CPT

## 2025-01-28 PROCEDURE — 85379 FIBRIN DEGRADATION QUANT: CPT

## 2025-01-28 PROCEDURE — 80076 HEPATIC FUNCTION PANEL: CPT

## 2025-01-28 PROCEDURE — 6360000002 HC RX W HCPCS: Performed by: INTERNAL MEDICINE

## 2025-01-28 PROCEDURE — 6370000000 HC RX 637 (ALT 250 FOR IP): Performed by: NURSE PRACTITIONER

## 2025-01-28 PROCEDURE — 97162 PT EVAL MOD COMPLEX 30 MIN: CPT

## 2025-01-28 PROCEDURE — 84550 ASSAY OF BLOOD/URIC ACID: CPT

## 2025-01-28 PROCEDURE — 97116 GAIT TRAINING THERAPY: CPT

## 2025-01-28 PROCEDURE — 85730 THROMBOPLASTIN TIME PARTIAL: CPT

## 2025-01-28 PROCEDURE — 97535 SELF CARE MNGMENT TRAINING: CPT

## 2025-01-28 PROCEDURE — 84100 ASSAY OF PHOSPHORUS: CPT

## 2025-01-28 PROCEDURE — 6370000000 HC RX 637 (ALT 250 FOR IP): Performed by: STUDENT IN AN ORGANIZED HEALTH CARE EDUCATION/TRAINING PROGRAM

## 2025-01-28 PROCEDURE — 2060000000 HC ICU INTERMEDIATE R&B

## 2025-01-28 PROCEDURE — 85610 PROTHROMBIN TIME: CPT

## 2025-01-28 PROCEDURE — 83735 ASSAY OF MAGNESIUM: CPT

## 2025-01-28 PROCEDURE — 2500000003 HC RX 250 WO HCPCS

## 2025-01-28 PROCEDURE — 36592 COLLECT BLOOD FROM PICC: CPT

## 2025-01-28 RX ORDER — QUETIAPINE FUMARATE 25 MG/1
100 TABLET, FILM COATED ORAL NIGHTLY
Status: DISCONTINUED | OUTPATIENT
Start: 2025-01-28 | End: 2025-02-13 | Stop reason: HOSPADM

## 2025-01-28 RX ADMIN — SODIUM CHLORIDE, PRESERVATIVE FREE 10 ML: 5 INJECTION INTRAVENOUS at 08:07

## 2025-01-28 RX ADMIN — DONEPEZIL HYDROCHLORIDE 10 MG: 10 TABLET ORAL at 07:45

## 2025-01-28 RX ADMIN — SODIUM CHLORIDE 15 ML: 900 IRRIGANT IRRIGATION at 06:29

## 2025-01-28 RX ADMIN — QUETIAPINE FUMARATE 100 MG: 25 TABLET ORAL at 20:14

## 2025-01-28 RX ADMIN — POTASSIUM CHLORIDE 20 MEQ: 400 INJECTION, SOLUTION INTRAVENOUS at 10:16

## 2025-01-28 RX ADMIN — HYDROCORTISONE: 1 CREAM TOPICAL at 07:51

## 2025-01-28 RX ADMIN — SODIUM CHLORIDE, PRESERVATIVE FREE 10 ML: 5 INJECTION INTRAVENOUS at 20:18

## 2025-01-28 RX ADMIN — TRETINOIN 50 MG: 10 CAPSULE ORAL at 15:42

## 2025-01-28 RX ADMIN — SODIUM CHLORIDE: 9 INJECTION, SOLUTION INTRAVENOUS at 18:21

## 2025-01-28 RX ADMIN — TROSPIUM CHLORIDE 20 MG: 20 TABLET, FILM COATED ORAL at 15:42

## 2025-01-28 RX ADMIN — TROSPIUM CHLORIDE 20 MG: 20 TABLET, FILM COATED ORAL at 06:14

## 2025-01-28 RX ADMIN — POTASSIUM CHLORIDE 20 MEQ: 400 INJECTION, SOLUTION INTRAVENOUS at 07:36

## 2025-01-28 RX ADMIN — FLUCONAZOLE 200 MG: 200 TABLET ORAL at 07:45

## 2025-01-28 RX ADMIN — POTASSIUM CHLORIDE 20 MEQ: 400 INJECTION, SOLUTION INTRAVENOUS at 11:21

## 2025-01-28 RX ADMIN — LEVOTHYROXINE SODIUM 175 MCG: 150 TABLET ORAL at 06:14

## 2025-01-28 RX ADMIN — TRETINOIN 50 MG: 10 CAPSULE ORAL at 07:46

## 2025-01-28 RX ADMIN — ARSENIC TRIOXIDE 13 MG: 1 INJECTION, SOLUTION INTRAVENOUS at 12:26

## 2025-01-28 RX ADMIN — SODIUM CHLORIDE 15 ML: 900 IRRIGANT IRRIGATION at 20:18

## 2025-01-28 RX ADMIN — LEVOFLOXACIN 500 MG: 500 TABLET, FILM COATED ORAL at 07:45

## 2025-01-28 RX ADMIN — HYDROCORTISONE: 1 CREAM TOPICAL at 20:15

## 2025-01-28 RX ADMIN — POTASSIUM CHLORIDE 20 MEQ: 400 INJECTION, SOLUTION INTRAVENOUS at 06:24

## 2025-01-28 RX ADMIN — VALACYCLOVIR HYDROCHLORIDE 500 MG: 500 TABLET, FILM COATED ORAL at 20:15

## 2025-01-28 RX ADMIN — VALACYCLOVIR HYDROCHLORIDE 500 MG: 500 TABLET, FILM COATED ORAL at 07:45

## 2025-01-28 RX ADMIN — CETIRIZINE HYDROCHLORIDE 10 MG: 10 TABLET, FILM COATED ORAL at 20:15

## 2025-01-28 RX ADMIN — TRAMADOL HYDROCHLORIDE 50 MG: 50 TABLET, COATED ORAL at 15:42

## 2025-01-28 ASSESSMENT — PAIN SCALES - WONG BAKER: WONGBAKER_NUMERICALRESPONSE: NO HURT

## 2025-01-28 ASSESSMENT — PAIN SCALES - GENERAL: PAINLEVEL_OUTOF10: 0

## 2025-01-28 NOTE — PROGRESS NOTES
[facility] Progress Note    2025     Karan Fortune    MRN: [id number]    : 1950    Referring MD: No referring provider defined for this encounter.      SUBJECTIVE:    -Tolerating chemotherapy well.   -Neurology evaluated pt confusion/delirium: recommend increasing Seroquel to 100 mg PO QHS        ECOG PS:  (2) Ambulatory and capable of self care, unable to carry out work activity, up and about > 50% or waking hours    KPS: 60% Requires occasional assistance, but is able to care for most of his personal needs    Isolation: None    Medications    Scheduled Meds:   hydrocortisone   Topical BID    arsenic trioxide (TRISENOX) 13 mg in sodium chloride 0.9 % 250 mL IVPB  13 mg IntraVENous Q24H    furosemide  40 mg IntraVENous Q12H    acetaminophen  650 mg Oral Once    trospium  20 mg Oral BID AC    QUEtiapine  50 mg Oral Nightly    cetirizine  10 mg Oral Nightly    donepezil  10 mg Oral Daily    sodium chloride flush  5-40 mL IntraVENous 2 times per day    Saline Mouthwash  15 mL Swish & Spit 4x Daily AC & HS    valACYclovir  500 mg Oral BID    fluconazole  200 mg Oral Daily    levoFLOXacin  500 mg Oral Daily    [Held by provider] allopurinol  300 mg Oral Daily    sodium chloride flush  5-40 mL IntraVENous 2 times per day    tretinoin  22.5 mg/m2 Oral Daily with breakfast    And    tretinoin  22.5 mg/m2 Oral Dinner    levothyroxine  175 mcg Oral Daily     Continuous Infusions:   sodium chloride      sodium chloride      sodium chloride      sodium chloride      sodium chloride 50 mL/hr at 25    sodium chloride       PRN Meds:.hydrALAZINE, sodium chloride, melatonin, magnesium sulfate, prochlorperazine **OR** prochlorperazine, potassium chloride, sodium chloride, traMADol, sodium chloride, sodium chloride flush, sodium chloride, ALTEplase (CATHFLO) 2 mg in sterile water 2 mL injection, sodium chloride flush, sodium chloride    ROS:  As noted above, otherwise remainder of 10-point ROS

## 2025-01-28 NOTE — CONS[place] [address]                              CONSULTATION      PATIENT NAME: RIKI FANG             : 1950  MED REC NO: [id number]                      ROOM: 3510  ACCOUNT NO: [id number]                       ADMIT DATE: 2025  PROVIDER: Luigi Matthew MD    NEUROLOGY CONSULTATION    CONSULT DATE: 2025    REFERRING PHYSICIAN:  Vesna Lundberg Dr.      IDENTIFYING INFORMATION:  The patient is a 74-year-old right-handed gentleman who was admitted on  for initiation of chemotherapy for acute leukemia.    REASON FOR CONSULTATION:  Assessment of confusion, delirium, and sleep dysfunction in a patient with baseline dementia.    HISTORY:  The patient is followed at [facility] and has a diagnosis of both dementia with Lewy bodies as well as Alzheimer disease.  A brain MRI scan from  showed bilateral parietal atrophy and mild white matter disease.  A DaTscan from  showed decreased uptake bilaterally, left greater than right of the stratum.  Lumbar puncture was performed in , which showed 2 RBC, 4 WBC, glucose of 58, protein 79, negative oligoclonal bands, normal IgG synthesis rate, Ab42 of 873, p-Tau of 187, and ATI of 0.52.  Review of records showed that he has been prescribed donepezil 20 mg nightly.  He was last seen at  Neurology Clinic on 2024.  He was scheduled for a followup visit on 2025.    He began to have cognitive symptoms in .  He officially retired from driving in 2024.    He is , lives at home with his wife.    He recently developed some petechiae as well as epistaxis, some blood test showed pancytopenia.  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  He was admitted for initiation of chemotherapy.    Per Hematology Oncology, he is tolerating chemotherapy

## 2025-01-28 NOTE — PROGRESS NOTES
Physical Therapy  Facility/Department: [address]  Physical Therapy Initial Assessment and Treatment    Name: Karan Fortune  : 1950  MRN: [id number]  Date of Service: 2025    Discharge Recommendations:  24 hour supervision or assist, Home with Home health PT   PT Equipment Recommendations  Equipment Needed:  (cont to assess)      Patient Diagnosis(es): The primary encounter diagnosis was Acute leukemia of unspecified cell type not having achieved remission (HCC). A diagnosis of Leukemia not having achieved remission, unspecified leukemia type (HCC) was also pertinent to this visit.  Past Medical History:  has a past medical history of Thyroid cancer (HCC).  Past Surgical History:  has a past surgical history that includes Total Thyroidectomy (); Hemorrhoid surgery (); Shoulder arthroscopy (); Colonoscopy (); Throat surgery; hernia repair; and Shoulder arthroscopy (12).    Assessment  Body Structures, Functions, Activity Limitations Requiring Skilled Therapeutic Intervention: Decreased functional mobility ;Decreased balance  Assessment: Pt with slightly decreased independent mobility from baseline.  Pt/wife report pt normally independent with mobility without AD.  Wife does state pt slightly unsteady at baseline.  Currently needing CGA for transfers and ambulation.  Needs cues to stay on task.  Rec cont skilled PT to maximize mobility and independence.  Rec home with 24 hr assist and home PT if not at baseline at d/c.  Will follow  Decision Making: Medium Complexity  Requires PT Follow-Up: Yes    Plan  Physical Therapy Plan  General Plan:  (2-5)  Current Treatment Recommendations: Balance training, Functional mobility training, Endurance training, Gait training, Safety education & training, Patient/Caregiver education & training  Safety Devices  Type of Devices: Nurse notified, Left in chair, Telesitter in use, Sitter present, Call light within

## 2025-01-28 NOTE — ONCOLOGY
Administration: Chemotherapy drug Arsenic independently verified with Lurdes Ferrer RN prior to administration.  Acknowledgement of informed consent for chemotherapy administration verified.  Original order, appropriateness of regimen, drug supplied, height, weight, BSA, dose calculations, expiration dates/times, drug appearance, and two patient identifiers were verified by both RNs.  Drug checked for vesicant/irritant status and for risk of hypersensitivity.  Most recent laboratory values and allergies, were reviewed.  Positive, brisk blood return via CVC was confirmed prior to administration. Chest x-ray for correct line placement reviewed. Kavya Mccurdy RN and Lurdes Ferrer RN verified correct rate of chemotherapy and maintenance IV fluids.  Patient was educated on chemotherapy regimen prior to administration including indication for treatment related to disease & side effects of chemotherapy drug.  Patient verbalizes understanding of all instructions.      Completion of Chemotherapy: Monitoring during infusion done per policy, see Flowsheets.  Blood return verified before, during, and after infusion per policy; no signs of extravasation.  Pt tolerated chemotherapy well and without incident.  Chemotherapy infusion end time on the MAR.

## 2025-01-28 NOTE — PLAN OF CARE
Problem: Safety - Adult  Goal: Free from fall injury  Outcome: Progressing  Free From Fall Injury:   Instruct family/caregiver on patient safety   Based on caregiver fall risk screen, instruct family/caregiver to ask for assistance with transferring infant if caregiver noted to have fall risk factors    Problem: ABCDS Injury Assessment  Goal: Absence of physical injury  Outcome: Progressing  Absence of Physical Injury: Implement safety measures based on patient assessment  Note: Orthostatic vital signs obtained at start of shift - see flowsheet for details.  Pt meets criteria for orthostasis.  Pt is a Med fall risk. See Dale Fall Score and ABCDS Injury Risk assessments.   + Screening for Orthostasis and/or + High Fall Risk per DALE/ABCDS: Explained fall risk precautions to pt and family and rationale behind their use to keep the patient safe. Pt bed is in low position, side rails up, call light and belongings are in reach. Fall wristband applied and present on pts wrist.  Bed alarm on.  Pt encouraged to call for assistance. Will continue with hourly rounds for PO intake, pain needs, toileting and repositioning as needed.     Problem: Neurosensory - Adult  Goal: Achieves stable or improved neurological status  Achieves stable or improved neurological status:   Assess for and report changes in neurological status   Initiate measures to prevent increased intracranial pressure   Maintain blood pressure and fluid volume within ordered parameters to optimize cerebral perfusion and minimize risk of hemorrhage   Monitor temperature, glucose, and sodium. Initiate appropriate interventions as ordered    Problem: Cardiovascular - Adult  Goal: Maintains optimal cardiac output and hemodynamic stability  Outcome: Progressing  Maintains optimal cardiac output and hemodynamic stability:   Monitor blood pressure and heart rate   Monitor urine output and notify Licensed Independent Practitioner for values outside of normal range

## 2025-01-28 NOTE — PROGRESS NOTES
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 A diagnosis of Leukemia not having achieved remission, unspecified leukemia type (HCC) was also pertinent to this visit.  Past Medical History:  has a past medical history of Thyroid cancer (HCC).  Past Surgical History:  has a past surgical history that includes Total Thyroidectomy (); Hemorrhoid surgery (); Shoulder arthroscopy (); Colonoscopy (); Throat surgery; hernia repair; and Shoulder arthroscopy (12).    Treatment Diagnosis: Impaired ADL and functional mobility      Assessment  Performance deficits / Impairments: Decreased functional mobility ;Decreased ADL status;Decreased endurance;Decreased safe awareness  Assessment: Pt with h/o dementia and is from home with wife who assists with all activities.  Pt is pleasant and very talkative.  Currently, req max assist for lower body dressing and CG for functional mobility.  Will follow for acute OT.  Treatment Diagnosis: Impaired ADL and functional mobility  Prognosis: Fair  Decision Making: Medium Complexity  REQUIRES OT FOLLOW-UP: Yes  Activity Tolerance  Activity Tolerance: Patient Tolerated treatment well     Plan  Occupational Therapy Plan  Times Per Week: 2-5  Current Treatment Recommendations: Strengthening, ROM, Balance training, Functional mobility training, Endurance training, Cognitive reorientation, Self-Care / ADL    Restrictions  Restrictions/Precautions  Activity Level: Up as Tolerated    Subjective  General  Chart Reviewed: Yes  Additional Pertinent Hx: Pt admitted

## 2025-01-28 NOTE — PLAN OF CARE
Problem: Safety - Adult  Goal: Free from fall injury  1/28/2025 1554 by Kavya Mccurdy RN  Outcome: Progressing     Problem: Cardiovascular - Adult  Goal: Maintains optimal cardiac output and hemodynamic stability  1/28/2025 1554 by Kavya Mccurdy RN  Outcome: Progressing     Problem: Respiratory - Adult  Goal: Achieves optimal ventilation and oxygenation  1/28/2025 1554 by Kavya Mccurdy RN  Outcome: Progressing

## 2025-01-29 LAB
ABO + RH BLD: NORMAL
ABO + RH BLD: NORMAL
ALBUMIN SERPL-MCNC: 3.4 G/DL (ref 3.4–5)
ALP SERPL-CCNC: 64 U/L (ref 40–129)
ALT SERPL-CCNC: 15 U/L (ref 10–40)
ANION GAP SERPL CALCULATED.3IONS-SCNC: 7 MMOL/L (ref 3–16)
ANISOCYTOSIS BLD QL SMEAR: ABNORMAL
APTT BLD: 33.2 SEC (ref 22.1–36.4)
APTT BLD: 33.8 SEC (ref 22.1–36.4)
APTT BLD: 36.1 SEC (ref 22.1–36.4)
AST SERPL-CCNC: 30 U/L (ref 15–37)
BASOPHILS # BLD: 0 K/UL (ref 0–0.2)
BASOPHILS NFR BLD: 0 %
BILIRUB DIRECT SERPL-MCNC: 0.2 MG/DL (ref 0–0.3)
BILIRUB INDIRECT SERPL-MCNC: 0.3 MG/DL (ref 0–1)
BILIRUB SERPL-MCNC: 0.5 MG/DL (ref 0–1)
BLD GP AB SCN SERPL QL: NORMAL
BLOOD BANK DISPENSE STATUS: NORMAL
BLOOD BANK DISPENSE STATUS: NORMAL
BLOOD BANK PRODUCT CODE: NORMAL
BLOOD BANK PRODUCT CODE: NORMAL
BPU ID: NORMAL
BPU ID: NORMAL
BUN SERPL-MCNC: 10 MG/DL (ref 7–20)
CALCIUM SERPL-MCNC: 8.2 MG/DL (ref 8.3–10.6)
CHLORIDE SERPL-SCNC: 105 MMOL/L (ref 99–110)
CO2 SERPL-SCNC: 22 MMOL/L (ref 21–32)
CREAT SERPL-MCNC: 0.7 MG/DL (ref 0.8–1.3)
D-DIMER QUANTITATIVE: 2.62 UG/ML FEU (ref 0–0.6)
D-DIMER QUANTITATIVE: 2.75 UG/ML FEU (ref 0–0.6)
D-DIMER QUANTITATIVE: 2.86 UG/ML FEU (ref 0–0.6)
DACRYOCYTES BLD QL SMEAR: ABNORMAL
DEPRECATED RDW RBC AUTO: 16.8 % (ref 12.4–15.4)
DESCRIPTION BLOOD BANK: NORMAL
DESCRIPTION BLOOD BANK: NORMAL
EOSINOPHIL # BLD: 0 K/UL (ref 0–0.6)
EOSINOPHIL NFR BLD: 0 %
FIBRINOGEN PPP-MCNC: 230 MG/DL (ref 227–534)
FIBRINOGEN PPP-MCNC: 247 MG/DL (ref 227–534)
FIBRINOGEN PPP-MCNC: 284 MG/DL (ref 227–534)
GFR SERPLBLD CREATININE-BSD FMLA CKD-EPI: >90 ML/MIN/{1.73_M2}
GLUCOSE SERPL-MCNC: 115 MG/DL (ref 70–99)
HCT VFR BLD AUTO: 19.8 % (ref 40.5–52.5)
HGB BLD-MCNC: 6.8 G/DL (ref 13.5–17.5)
INR PPP: 1.1 (ref 0.85–1.15)
INR PPP: 1.11 (ref 0.85–1.15)
INR PPP: 1.19 (ref 0.85–1.15)
LDH SERPL L TO P-CCNC: 200 U/L (ref 100–190)
LYMPHOCYTES # BLD: 0.6 K/UL (ref 1–5.1)
LYMPHOCYTES NFR BLD: 48 %
MACROCYTES BLD QL SMEAR: ABNORMAL
MAGNESIUM SERPL-MCNC: 2.06 MG/DL (ref 1.8–2.4)
MCH RBC QN AUTO: 35.5 PG (ref 26–34)
MCHC RBC AUTO-ENTMCNC: 34.2 G/DL (ref 31–36)
MCV RBC AUTO: 103.8 FL (ref 80–100)
METAMYELOCYTES NFR BLD MANUAL: 8 %
MONOCYTES # BLD: 0 K/UL (ref 0–1.3)
MONOCYTES NFR BLD: 0 %
NEUTROPHILS # BLD: 0.6 K/UL (ref 1.7–7.7)
NEUTROPHILS NFR BLD: 40 %
NEUTS BAND NFR BLD MANUAL: 2 % (ref 0–7)
NRBC BLD-RTO: 2 /100 WBC
PATH INTERP BLD-IMP: NO
PHOSPHATE SERPL-MCNC: 4 MG/DL (ref 2.5–4.9)
PLATELET # BLD AUTO: 41 K/UL (ref 135–450)
PLATELET BLD QL SMEAR: ABNORMAL
PMV BLD AUTO: 8.2 FL (ref 5–10.5)
POTASSIUM SERPL-SCNC: 4 MMOL/L (ref 3.5–5.1)
PROT SERPL-MCNC: 5.3 G/DL (ref 6.4–8.2)
PROTHROMBIN TIME: 14.4 SEC (ref 11.9–14.9)
PROTHROMBIN TIME: 14.5 SEC (ref 11.9–14.9)
PROTHROMBIN TIME: 15.3 SEC (ref 11.9–14.9)
RBC # BLD AUTO: 1.91 M/UL (ref 4.2–5.9)
SCHISTOCYTES BLD QL SMEAR: ABNORMAL
SLIDE REVIEW: ABNORMAL
SMUDGE CELLS BLD QL SMEAR: PRESENT
SODIUM SERPL-SCNC: 134 MMOL/L (ref 136–145)
TOXIC GRANULES BLD QL SMEAR: PRESENT
URATE SERPL-MCNC: 3 MG/DL (ref 3.5–7.2)
VARIANT LYMPHS NFR BLD MANUAL: 2 % (ref 0–6)
WBC # BLD AUTO: 1.2 K/UL (ref 4–11)

## 2025-01-29 PROCEDURE — 2500000003 HC RX 250 WO HCPCS

## 2025-01-29 PROCEDURE — 86850 RBC ANTIBODY SCREEN: CPT

## 2025-01-29 PROCEDURE — P9036 PLATELET PHERESIS IRRADIATED: HCPCS

## 2025-01-29 PROCEDURE — 2580000003 HC RX 258: Performed by: NURSE PRACTITIONER

## 2025-01-29 PROCEDURE — 6360000002 HC RX W HCPCS: Performed by: INTERNAL MEDICINE

## 2025-01-29 PROCEDURE — 6370000000 HC RX 637 (ALT 250 FOR IP): Performed by: STUDENT IN AN ORGANIZED HEALTH CARE EDUCATION/TRAINING PROGRAM

## 2025-01-29 PROCEDURE — 80048 BASIC METABOLIC PNL TOTAL CA: CPT

## 2025-01-29 PROCEDURE — 85379 FIBRIN DEGRADATION QUANT: CPT

## 2025-01-29 PROCEDURE — 83615 LACTATE (LD) (LDH) ENZYME: CPT

## 2025-01-29 PROCEDURE — 2060000000 HC ICU INTERMEDIATE R&B

## 2025-01-29 PROCEDURE — 86900 BLOOD TYPING SEROLOGIC ABO: CPT

## 2025-01-29 PROCEDURE — 84550 ASSAY OF BLOOD/URIC ACID: CPT

## 2025-01-29 PROCEDURE — 6370000000 HC RX 637 (ALT 250 FOR IP): Performed by: NURSE PRACTITIONER

## 2025-01-29 PROCEDURE — 97535 SELF CARE MNGMENT TRAINING: CPT

## 2025-01-29 PROCEDURE — P9040 RBC LEUKOREDUCED IRRADIATED: HCPCS

## 2025-01-29 PROCEDURE — 85730 THROMBOPLASTIN TIME PARTIAL: CPT

## 2025-01-29 PROCEDURE — 85384 FIBRINOGEN ACTIVITY: CPT

## 2025-01-29 PROCEDURE — 97530 THERAPEUTIC ACTIVITIES: CPT

## 2025-01-29 PROCEDURE — 36592 COLLECT BLOOD FROM PICC: CPT

## 2025-01-29 PROCEDURE — 36430 TRANSFUSION BLD/BLD COMPNT: CPT

## 2025-01-29 PROCEDURE — 83735 ASSAY OF MAGNESIUM: CPT

## 2025-01-29 PROCEDURE — 86923 COMPATIBILITY TEST ELECTRIC: CPT

## 2025-01-29 PROCEDURE — 80076 HEPATIC FUNCTION PANEL: CPT

## 2025-01-29 PROCEDURE — 86901 BLOOD TYPING SEROLOGIC RH(D): CPT

## 2025-01-29 PROCEDURE — 6370000000 HC RX 637 (ALT 250 FOR IP)

## 2025-01-29 PROCEDURE — 85610 PROTHROMBIN TIME: CPT

## 2025-01-29 PROCEDURE — 2580000003 HC RX 258: Performed by: INTERNAL MEDICINE

## 2025-01-29 PROCEDURE — 84100 ASSAY OF PHOSPHORUS: CPT

## 2025-01-29 RX ADMIN — TRAMADOL HYDROCHLORIDE 50 MG: 50 TABLET, COATED ORAL at 08:18

## 2025-01-29 RX ADMIN — POTASSIUM CHLORIDE 20 MEQ: 400 INJECTION, SOLUTION INTRAVENOUS at 11:21

## 2025-01-29 RX ADMIN — CETIRIZINE HYDROCHLORIDE 10 MG: 10 TABLET, FILM COATED ORAL at 21:03

## 2025-01-29 RX ADMIN — VALACYCLOVIR HYDROCHLORIDE 500 MG: 500 TABLET, FILM COATED ORAL at 08:13

## 2025-01-29 RX ADMIN — TROSPIUM CHLORIDE 20 MG: 20 TABLET, FILM COATED ORAL at 16:19

## 2025-01-29 RX ADMIN — POTASSIUM CHLORIDE 20 MEQ: 400 INJECTION, SOLUTION INTRAVENOUS at 09:15

## 2025-01-29 RX ADMIN — TRETINOIN 50 MG: 10 CAPSULE ORAL at 16:19

## 2025-01-29 RX ADMIN — POTASSIUM CHLORIDE 20 MEQ: 400 INJECTION, SOLUTION INTRAVENOUS at 08:07

## 2025-01-29 RX ADMIN — SODIUM CHLORIDE: 9 INJECTION, SOLUTION INTRAVENOUS at 21:20

## 2025-01-29 RX ADMIN — DONEPEZIL HYDROCHLORIDE 10 MG: 10 TABLET ORAL at 08:14

## 2025-01-29 RX ADMIN — FUROSEMIDE 40 MG: 10 INJECTION, SOLUTION INTRAMUSCULAR; INTRAVENOUS at 18:10

## 2025-01-29 RX ADMIN — SODIUM CHLORIDE, PRESERVATIVE FREE 10 ML: 5 INJECTION INTRAVENOUS at 08:27

## 2025-01-29 RX ADMIN — TROSPIUM CHLORIDE 20 MG: 20 TABLET, FILM COATED ORAL at 06:05

## 2025-01-29 RX ADMIN — SODIUM CHLORIDE, PRESERVATIVE FREE 10 ML: 5 INJECTION INTRAVENOUS at 21:11

## 2025-01-29 RX ADMIN — SODIUM CHLORIDE, PRESERVATIVE FREE 20 ML: 5 INJECTION INTRAVENOUS at 08:13

## 2025-01-29 RX ADMIN — QUETIAPINE FUMARATE 100 MG: 25 TABLET ORAL at 21:03

## 2025-01-29 RX ADMIN — POTASSIUM CHLORIDE 20 MEQ: 400 INJECTION, SOLUTION INTRAVENOUS at 05:57

## 2025-01-29 RX ADMIN — SODIUM CHLORIDE, PRESERVATIVE FREE 10 ML: 5 INJECTION INTRAVENOUS at 22:49

## 2025-01-29 RX ADMIN — LEVOFLOXACIN 500 MG: 500 TABLET, FILM COATED ORAL at 08:13

## 2025-01-29 RX ADMIN — ARSENIC TRIOXIDE 13 MG: 1 INJECTION, SOLUTION INTRAVENOUS at 12:20

## 2025-01-29 RX ADMIN — VALACYCLOVIR HYDROCHLORIDE 500 MG: 500 TABLET, FILM COATED ORAL at 21:03

## 2025-01-29 RX ADMIN — FLUCONAZOLE 200 MG: 200 TABLET ORAL at 08:13

## 2025-01-29 RX ADMIN — LEVOTHYROXINE SODIUM 175 MCG: 150 TABLET ORAL at 06:05

## 2025-01-29 RX ADMIN — TRETINOIN 50 MG: 10 CAPSULE ORAL at 08:14

## 2025-01-29 RX ADMIN — HYDROCORTISONE: 1 CREAM TOPICAL at 08:15

## 2025-01-29 ASSESSMENT — PAIN DESCRIPTION - FREQUENCY: FREQUENCY: CONTINUOUS

## 2025-01-29 ASSESSMENT — PAIN - FUNCTIONAL ASSESSMENT: PAIN_FUNCTIONAL_ASSESSMENT: ACTIVITIES ARE NOT PREVENTED

## 2025-01-29 ASSESSMENT — PAIN SCALES - WONG BAKER
WONGBAKER_NUMERICALRESPONSE: HURTS A LITTLE BIT
WONGBAKER_NUMERICALRESPONSE: HURTS EVEN MORE
WONGBAKER_NUMERICALRESPONSE: HURTS A LITTLE BIT

## 2025-01-29 ASSESSMENT — PAIN DESCRIPTION - PAIN TYPE: TYPE: CHRONIC PAIN

## 2025-01-29 ASSESSMENT — PAIN DESCRIPTION - LOCATION: LOCATION: SHOULDER

## 2025-01-29 ASSESSMENT — PAIN DESCRIPTION - ONSET: ONSET: ON-GOING

## 2025-01-29 ASSESSMENT — PAIN DESCRIPTION - DESCRIPTORS: DESCRIPTORS: ACHING;DISCOMFORT

## 2025-01-29 ASSESSMENT — PAIN DESCRIPTION - ORIENTATION: ORIENTATION: LEFT

## 2025-01-29 NOTE — PROGRESS NOTES
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 A diagnosis of Leukemia not having achieved remission, unspecified leukemia type (HCC) was also pertinent to this visit.  Past Medical History:  has a past medical history of Thyroid cancer (HCC).  Past Surgical History:  has a past surgical history that includes Total Thyroidectomy (); Hemorrhoid surgery (); Shoulder arthroscopy (); Colonoscopy (); Throat surgery; hernia repair; and Shoulder arthroscopy (12).    Treatment Diagnosis: Impaired ADL and functional mobility      Assessment  Performance deficits / Impairments: Decreased functional mobility ;Decreased ADL status;Decreased endurance;Decreased safe awareness  Assessment: Pt is pleasnt and cooperative, but req cues with all activities.  Cont to req max assist for lower body dressing and CG for functional mobility.  Cont OT tx per plan of care.  Treatment Diagnosis: Impaired ADL and functional mobility  Prognosis: Fair  REQUIRES OT FOLLOW-UP: Yes  Activity Tolerance  Activity Tolerance: Patient Tolerated treatment well     Plan  Occupational Therapy Plan  Times Per Week: 2-5  Current Treatment Recommendations: Strengthening, ROM, Balance training, Functional mobility training, Endurance training, Cognitive reorientation, Self-Care / ADL    Restrictions  Restrictions/Precautions  Activity Level: Up as Tolerated    Subjective  General  Chart Reviewed: Yes  Additional Pertinent Hx: Pt admitted 25 for further work-up, treatment and supportive care for new diagnosis of APL..  Family / Caregiver

## 2025-01-29 NOTE — PROGRESS NOTES
[facility] Progress Note    2025     Karan Fortune    MRN: [id number]    : 1950    Referring MD: No referring provider defined for this encounter.      SUBJECTIVE:    -Tolerating chemotherapy well.   - Slept well last night.       ECOG PS:  (2) Ambulatory and capable of self care, unable to carry out work activity, up and about > 50% or waking hours    KPS: 60% Requires occasional assistance, but is able to care for most of his personal needs    Isolation: None    Medications    Scheduled Meds:   QUEtiapine  100 mg Oral Nightly    hydrocortisone   Topical BID    arsenic trioxide (TRISENOX) 13 mg in sodium chloride 0.9 % 250 mL IVPB  13 mg IntraVENous Q24H    furosemide  40 mg IntraVENous Q12H    trospium  20 mg Oral BID AC    cetirizine  10 mg Oral Nightly    donepezil  10 mg Oral Daily    sodium chloride flush  5-40 mL IntraVENous 2 times per day    Saline Mouthwash  15 mL Swish & Spit 4x Daily AC & HS    valACYclovir  500 mg Oral BID    fluconazole  200 mg Oral Daily    levoFLOXacin  500 mg Oral Daily    sodium chloride flush  5-40 mL IntraVENous 2 times per day    tretinoin  22.5 mg/m2 Oral Daily with breakfast    And    tretinoin  22.5 mg/m2 Oral Dinner    levothyroxine  175 mcg Oral Daily     Continuous Infusions:   sodium chloride      sodium chloride      sodium chloride      sodium chloride      sodium chloride 50 mL/hr at 25 1821    sodium chloride       PRN Meds:.hydrALAZINE, sodium chloride, melatonin, magnesium sulfate, prochlorperazine **OR** prochlorperazine, potassium chloride, sodium chloride, traMADol, sodium chloride, sodium chloride flush, sodium chloride, ALTEplase (CATHFLO) 2 mg in sterile water 2 mL injection, sodium chloride flush, sodium chloride    ROS:  As noted above, otherwise remainder of 10-point ROS negative    Physical Exam:     I&O:    Intake/Output Summary (Last 24 hours) at 2025 0854  Last data filed at 2025 0810  Gross per 24 hour   Intake 2715.45 ml

## 2025-01-29 NOTE — PLAN OF CARE
Problem: Safety - Adult  Goal: Free from fall injury  Outcome: Progressing  Free From Fall Injury:   Instruct family/caregiver on patient safety   Based on caregiver fall risk screen, instruct family/caregiver to ask for assistance with transferring infant if caregiver noted to have fall risk factors    Problem: ABCDS Injury Assessment  Goal: Absence of physical injury  Outcome: Progressing  Absence of Physical Injury: Implement safety measures based on patient assessment  Note: Orthostatic vital signs obtained at start of shift - see flowsheet for details.  Pt meets criteria for orthostasis.  Pt is a Med fall risk. See Dale Fall Score and ABCDS Injury Risk assessments.   + Screening for Orthostasis and/or + High Fall Risk per DALE/ABCDS: Explained fall risk precautions to pt and family and rationale behind their use to keep the patient safe. Pt bed is in low position, side rails up, call light and belongings are in reach. Fall wristband applied and present on pts wrist.  Bed alarm on.  Pt encouraged to call for assistance. Will continue with hourly rounds for PO intake, pain needs, toileting and repositioning as needed.       Problem: Neurosensory - Adult  Goal: Achieves stable or improved neurological status  Outcome: Progressing  Achieves stable or improved neurological status:   Assess for and report changes in neurological status   Initiate measures to prevent increased intracranial pressure   Maintain blood pressure and fluid volume within ordered parameters to optimize cerebral perfusion and minimize risk of hemorrhage   Monitor temperature, glucose, and sodium. Initiate appropriate interventions as ordered      Problem: Respiratory - Adult  Goal: Achieves optimal ventilation and oxygenation  Outcome: Progressing  Achieves optimal ventilation and oxygenation:   Assess for changes in respiratory status   Position to facilitate oxygenation and minimize respiratory effort   Initiate smoking cessation protocol  measures as appropriate   Nutrition consult to assist patient with adequate nutrition and appropriate food choices   Maintain NPO status until nausea and vomiting are resolved   Administer ordered antiemetic medications as needed   Advance diet as tolerated, if ordered  Note: No nausea and vomiting noted during this shift.       Problem: Genitourinary - Adult  Goal: Absence of urinary retention  Outcome: Progressing  Absence of urinary retention:   Assess patient’s ability to void and empty bladder   Place urinary catheter per Licensed Independent Practitioner order if needed   Discuss catheterization for long term situations as appropriate   Monitor intake/output and perform bladder scan as needed   Discuss with Licensed Independent Practitioner  medications to alleviate retention as needed       Problem: Infection - Adult  Goal: Absence of infection at discharge  Outcome: Progressing  Absence of infection at discharge:   Assess and monitor for signs and symptoms of infection   Monitor all insertion sites i.e., indwelling lines, tubes and drains   [place] appropriate cooling/warming therapies per order   Instruct and encourage patient and family to use good hand hygiene technique   Monitor lab/diagnostic results   Monitor endotracheal (as able) and nasal secretions for changes in amount and color   Administer medications as ordered   Identify and instruct in appropriate isolation precautions for identified infection/condition      Problem: Hematologic - Adult  Goal: Maintains hematologic stability  Outcome: Progressing  Maintains hematologic stability:   Assess for signs and symptoms of bleeding or hemorrhage   Administer blood products/factors as ordered   Monitor labs for bleeding or clotting disorders  Note: Patient's hemoglobin this AM:   Recent Labs     01/28/25  2252   HGB 6.8*     Patient's platelet count this AM:   Recent Labs     01/28/25  2252   PLT 41*    Thrombocytopenia Precautions in place.  Patient

## 2025-01-30 LAB
ALBUMIN SERPL-MCNC: 3.7 G/DL (ref 3.4–5)
ALP SERPL-CCNC: 74 U/L (ref 40–129)
ALT SERPL-CCNC: 18 U/L (ref 10–40)
ANION GAP SERPL CALCULATED.3IONS-SCNC: 10 MMOL/L (ref 3–16)
APTT BLD: 28.8 SEC (ref 22.1–36.4)
APTT BLD: 30.8 SEC (ref 22.1–36.4)
AST SERPL-CCNC: 33 U/L (ref 15–37)
BASOPHILS # BLD: 0 K/UL (ref 0–0.2)
BASOPHILS NFR BLD: 0.6 %
BILIRUB DIRECT SERPL-MCNC: 0.2 MG/DL (ref 0–0.3)
BILIRUB INDIRECT SERPL-MCNC: 0.4 MG/DL (ref 0–1)
BILIRUB SERPL-MCNC: 0.6 MG/DL (ref 0–1)
BUN SERPL-MCNC: 9 MG/DL (ref 7–20)
CALCIUM SERPL-MCNC: 8.5 MG/DL (ref 8.3–10.6)
CHLORIDE SERPL-SCNC: 105 MMOL/L (ref 99–110)
CO2 SERPL-SCNC: 23 MMOL/L (ref 21–32)
CREAT SERPL-MCNC: 0.7 MG/DL (ref 0.8–1.3)
D-DIMER QUANTITATIVE: 2.56 UG/ML FEU (ref 0–0.6)
D-DIMER QUANTITATIVE: 2.69 UG/ML FEU (ref 0–0.6)
DEPRECATED RDW RBC AUTO: 18.9 % (ref 12.4–15.4)
EKG ATRIAL RATE: 90 BPM
EKG DIAGNOSIS: NORMAL
EKG P AXIS: 57 DEGREES
EKG P-R INTERVAL: 148 MS
EKG Q-T INTERVAL: 386 MS
EKG QRS DURATION: 80 MS
EKG QTC CALCULATION (BAZETT): 472 MS
EKG R AXIS: 3 DEGREES
EKG T AXIS: 31 DEGREES
EKG VENTRICULAR RATE: 90 BPM
EOSINOPHIL # BLD: 0 K/UL (ref 0–0.6)
EOSINOPHIL NFR BLD: 2 %
FIBRINOGEN PPP-MCNC: 245 MG/DL (ref 227–534)
FIBRINOGEN PPP-MCNC: 260 MG/DL (ref 227–534)
GFR SERPLBLD CREATININE-BSD FMLA CKD-EPI: >90 ML/MIN/{1.73_M2}
GLUCOSE SERPL-MCNC: 110 MG/DL (ref 70–99)
HCT VFR BLD AUTO: 24 % (ref 40.5–52.5)
HGB BLD-MCNC: 8.4 G/DL (ref 13.5–17.5)
INR PPP: 1.13 (ref 0.85–1.15)
INR PPP: 1.16 (ref 0.85–1.15)
LDH SERPL L TO P-CCNC: 230 U/L (ref 100–190)
LYMPHOCYTES # BLD: 0.5 K/UL (ref 1–5.1)
LYMPHOCYTES NFR BLD: 36.6 %
MAGNESIUM SERPL-MCNC: 1.98 MG/DL (ref 1.8–2.4)
MCH RBC QN AUTO: 34.3 PG (ref 26–34)
MCHC RBC AUTO-ENTMCNC: 34.9 G/DL (ref 31–36)
MCV RBC AUTO: 98.3 FL (ref 80–100)
MONOCYTES # BLD: 0.6 K/UL (ref 0–1.3)
MONOCYTES NFR BLD: 40.7 %
NEUTROPHILS # BLD: 0.3 K/UL (ref 1.7–7.7)
NEUTROPHILS NFR BLD: 20.1 %
PHOSPHATE SERPL-MCNC: 4.2 MG/DL (ref 2.5–4.9)
PLATELET # BLD AUTO: 73 K/UL (ref 135–450)
PMV BLD AUTO: 8.3 FL (ref 5–10.5)
POTASSIUM SERPL-SCNC: 3.7 MMOL/L (ref 3.5–5.1)
PROT SERPL-MCNC: 5.8 G/DL (ref 6.4–8.2)
PROTHROMBIN TIME: 14.7 SEC (ref 11.9–14.9)
PROTHROMBIN TIME: 15 SEC (ref 11.9–14.9)
RBC # BLD AUTO: 2.44 M/UL (ref 4.2–5.9)
SODIUM SERPL-SCNC: 138 MMOL/L (ref 136–145)
URATE SERPL-MCNC: 3.5 MG/DL (ref 3.5–7.2)
WBC # BLD AUTO: 1.4 K/UL (ref 4–11)

## 2025-01-30 PROCEDURE — 6370000000 HC RX 637 (ALT 250 FOR IP): Performed by: NURSE PRACTITIONER

## 2025-01-30 PROCEDURE — 6370000000 HC RX 637 (ALT 250 FOR IP)

## 2025-01-30 PROCEDURE — 85384 FIBRINOGEN ACTIVITY: CPT

## 2025-01-30 PROCEDURE — 80048 BASIC METABOLIC PNL TOTAL CA: CPT

## 2025-01-30 PROCEDURE — 2580000003 HC RX 258

## 2025-01-30 PROCEDURE — 2500000003 HC RX 250 WO HCPCS

## 2025-01-30 PROCEDURE — 83615 LACTATE (LD) (LDH) ENZYME: CPT

## 2025-01-30 PROCEDURE — 93010 ELECTROCARDIOGRAM REPORT: CPT | Performed by: INTERNAL MEDICINE

## 2025-01-30 PROCEDURE — 2580000003 HC RX 258: Performed by: NURSE PRACTITIONER

## 2025-01-30 PROCEDURE — 83735 ASSAY OF MAGNESIUM: CPT

## 2025-01-30 PROCEDURE — 85610 PROTHROMBIN TIME: CPT

## 2025-01-30 PROCEDURE — 85730 THROMBOPLASTIN TIME PARTIAL: CPT

## 2025-01-30 PROCEDURE — 80076 HEPATIC FUNCTION PANEL: CPT

## 2025-01-30 PROCEDURE — 85379 FIBRIN DEGRADATION QUANT: CPT

## 2025-01-30 PROCEDURE — 6360000002 HC RX W HCPCS: Performed by: INTERNAL MEDICINE

## 2025-01-30 PROCEDURE — 6370000000 HC RX 637 (ALT 250 FOR IP): Performed by: STUDENT IN AN ORGANIZED HEALTH CARE EDUCATION/TRAINING PROGRAM

## 2025-01-30 PROCEDURE — 85025 COMPLETE CBC W/AUTO DIFF WBC: CPT

## 2025-01-30 PROCEDURE — 2060000000 HC ICU INTERMEDIATE R&B

## 2025-01-30 PROCEDURE — 6360000002 HC RX W HCPCS: Performed by: NURSE PRACTITIONER

## 2025-01-30 PROCEDURE — 84100 ASSAY OF PHOSPHORUS: CPT

## 2025-01-30 PROCEDURE — 2580000003 HC RX 258: Performed by: INTERNAL MEDICINE

## 2025-01-30 PROCEDURE — 93005 ELECTROCARDIOGRAM TRACING: CPT | Performed by: INTERNAL MEDICINE

## 2025-01-30 PROCEDURE — 84550 ASSAY OF BLOOD/URIC ACID: CPT

## 2025-01-30 PROCEDURE — 97116 GAIT TRAINING THERAPY: CPT

## 2025-01-30 RX ADMIN — Medication 3 MG: at 20:22

## 2025-01-30 RX ADMIN — SODIUM CHLORIDE, PRESERVATIVE FREE 10 ML: 5 INJECTION INTRAVENOUS at 08:17

## 2025-01-30 RX ADMIN — SODIUM CHLORIDE: 9 INJECTION, SOLUTION INTRAVENOUS at 05:59

## 2025-01-30 RX ADMIN — TRETINOIN 50 MG: 10 CAPSULE ORAL at 08:18

## 2025-01-30 RX ADMIN — SODIUM CHLORIDE, PRESERVATIVE FREE 10 ML: 5 INJECTION INTRAVENOUS at 20:23

## 2025-01-30 RX ADMIN — QUETIAPINE FUMARATE 100 MG: 25 TABLET ORAL at 20:23

## 2025-01-30 RX ADMIN — SODIUM CHLORIDE, PRESERVATIVE FREE 10 ML: 5 INJECTION INTRAVENOUS at 08:18

## 2025-01-30 RX ADMIN — DONEPEZIL HYDROCHLORIDE 10 MG: 10 TABLET ORAL at 08:16

## 2025-01-30 RX ADMIN — POTASSIUM CHLORIDE 20 MEQ: 400 INJECTION, SOLUTION INTRAVENOUS at 10:41

## 2025-01-30 RX ADMIN — HYDROCORTISONE: 1 CREAM TOPICAL at 10:39

## 2025-01-30 RX ADMIN — SODIUM CHLORIDE: 9 INJECTION, SOLUTION INTRAVENOUS at 17:22

## 2025-01-30 RX ADMIN — POTASSIUM CHLORIDE 20 MEQ: 400 INJECTION, SOLUTION INTRAVENOUS at 07:26

## 2025-01-30 RX ADMIN — POTASSIUM CHLORIDE 20 MEQ: 400 INJECTION, SOLUTION INTRAVENOUS at 06:02

## 2025-01-30 RX ADMIN — TRETINOIN 50 MG: 10 CAPSULE ORAL at 17:19

## 2025-01-30 RX ADMIN — LEVOTHYROXINE SODIUM 175 MCG: 150 TABLET ORAL at 06:36

## 2025-01-30 RX ADMIN — ARSENIC TRIOXIDE 13 MG: 1 INJECTION, SOLUTION INTRAVENOUS at 12:13

## 2025-01-30 RX ADMIN — VALACYCLOVIR HYDROCHLORIDE 500 MG: 500 TABLET, FILM COATED ORAL at 08:15

## 2025-01-30 RX ADMIN — CETIRIZINE HYDROCHLORIDE 10 MG: 10 TABLET, FILM COATED ORAL at 20:22

## 2025-01-30 RX ADMIN — TROSPIUM CHLORIDE 20 MG: 20 TABLET, FILM COATED ORAL at 06:36

## 2025-01-30 RX ADMIN — VALACYCLOVIR HYDROCHLORIDE 500 MG: 500 TABLET, FILM COATED ORAL at 20:22

## 2025-01-30 RX ADMIN — POTASSIUM CHLORIDE 20 MEQ: 400 INJECTION, SOLUTION INTRAVENOUS at 09:39

## 2025-01-30 RX ADMIN — FLUCONAZOLE 200 MG: 200 TABLET ORAL at 08:16

## 2025-01-30 RX ADMIN — MAGNESIUM SULFATE HEPTAHYDRATE 4000 MG: 40 INJECTION, SOLUTION INTRAVENOUS at 06:08

## 2025-01-30 RX ADMIN — TROSPIUM CHLORIDE 20 MG: 20 TABLET, FILM COATED ORAL at 15:31

## 2025-01-30 RX ADMIN — LEVOFLOXACIN 500 MG: 500 TABLET, FILM COATED ORAL at 08:15

## 2025-01-30 NOTE — PROGRESS NOTES
acute leukemia. He was referred to [facility] hematology department by his PCP for thrombocytopenia and petechiae. He underwent a BMBx on 1/17/25. Dr. DeL a Cruz called FISH results to Dr. Anne Lundberg, which reportedly show APL. He is admitted for further work-up, treatment and supportive care.     CXR: neg.    PMH: thyroid cancer, followed at , in remission, Lewy body dementia, hypersomnolence, allergic rhinitis and BPH.  Family/Caregiver Present: Yes (wife)  Subjective  Subjective: Pt found sitting in chair.  Agreeable to PT.  No c/o pain.  Talkative, easily distracted         Social/Functional History  Social/Functional History  Lives With: Spouse  Type of Home: Apartment (3rd floor apartment)  Home Layout: One level  Home Access: Level entry (long hallway to get to apartment)  Bathroom Shower/Tub: Walk-in shower  Bathroom Toilet: Standard (nothing next to toilet)  Bathroom Equipment: Grab bars in shower  Home Equipment: None  Has the patient had two or more falls in the past year or any fall with injury in the past year?:  (Fell in September - missed step in parking garage.)  Prior Level of Assist for ADLs: Needs assistance (able to shower independently, assist for dressing)  Prior Level of Assist for Homemaking:  (wife performs)  Prior Level of Assist for Ambulation: Independent community ambulator, with or without device  Prior Level of Assist for Transfers: Independent  Active : No  Leisure & Hobbies: watches tv, spending time with cats  Additional Comments: Wife with pt all the time.  2 sons and grandkids in town.  Vision/Hearing  Vision  Vision: Impaired  Vision Exceptions: Wears glasses for reading  Hearing  Hearing: Within functional limits    Cognition   Orientation  Orientation Level: Oriented to person;Oriented to place;Disoriented to time;Disoriented to situation    Objective                                    Transfers  Sit to Stand: Contact guard assistance  Stand to Sit: Contact guard

## 2025-01-30 NOTE — PROGRESS NOTES
Administration: Chemotherapy drug Arsenic independently verified with Savita Arechiga RN prior to administration.  Acknowledgement of informed consent for chemotherapy administration verified.  Original order, appropriateness of regimen, drug supplied, height, weight, BSA, dose calculations, expiration dates/times, drug appearance, and two patient identifiers were verified by both RNs.  Drug checked for vesicant/irritant status and for risk of hypersensitivity.  Most recent laboratory values and allergies, were reviewed.  Positive, brisk blood return via CVC was confirmed prior to administration. Chest x-ray for correct line placement reviewed. Irina Barrios RN and Savita Arechiga RN verified correct rate of chemotherapy and maintenance IV fluids.  Patient was educated on chemotherapy regimen prior to administration including indication for treatment related to disease & side effects of chemotherapy drug.  Patient verbalizes understanding of all instructions.    Completion of Chemotherapy: Monitoring during infusion done per policy, see Flowsheets.  Blood return verified before, during, and after infusion per policy; no signs of extravasation.  Pt tolerated chemotherapy well and without incident.  Chemotherapy infusion end time on the MAR.  Will continue to monitor.

## 2025-01-30 NOTE — PLAN OF CARE
Problem: Safety - Adult  Goal: Free from fall injury  Outcome: Progressing     Problem: ABCDS Injury Assessment  Goal: Absence of physical injury  Outcome: Progressing     Problem: Neurosensory - Adult  Goal: Achieves stable or improved neurological status  Outcome: Progressing

## 2025-01-30 NOTE — PROGRESS NOTES
Patient seen and assessed for standard line care needs.  CVC site remains free of visible signs and symptoms of infection.  No drainage, edema, erythema, pain, itching, or warmth noted at and around the insertion site.  Line care performed by Violeta Clark RN.  The need for continued use of the CVC is due to ongoing therapy.  Patient verbalized understanding of the line care education provided by line care RN.  Staff RNs will continue to monitor and assess the CVC site throughout the patient's hospital stay.  Sterile dressing changes will continue to be changed per policy.

## 2025-01-30 NOTE — PROGRESS NOTES
[facility] Progress Note    2025     Karan Fortune    MRN: [id number]    : 1950    Referring MD: No referring provider defined for this encounter.      SUBJECTIVE:    -Tolerating chemotherapy well.   - Slept well last night.       ECOG PS:  (2) Ambulatory and capable of self care, unable to carry out work activity, up and about > 50% or waking hours    KPS: 60% Requires occasional assistance, but is able to care for most of his personal needs    Isolation: None    Medications    Scheduled Meds:   QUEtiapine  100 mg Oral Nightly    hydrocortisone   Topical BID    arsenic trioxide (TRISENOX) 13 mg in sodium chloride 0.9 % 250 mL IVPB  13 mg IntraVENous Q24H    furosemide  40 mg IntraVENous Q12H    trospium  20 mg Oral BID AC    cetirizine  10 mg Oral Nightly    donepezil  10 mg Oral Daily    sodium chloride flush  5-40 mL IntraVENous 2 times per day    Saline Mouthwash  15 mL Swish & Spit 4x Daily AC & HS    valACYclovir  500 mg Oral BID    fluconazole  200 mg Oral Daily    levoFLOXacin  500 mg Oral Daily    sodium chloride flush  5-40 mL IntraVENous 2 times per day    tretinoin  22.5 mg/m2 Oral Daily with breakfast    And    tretinoin  22.5 mg/m2 Oral Dinner    levothyroxine  175 mcg Oral Daily     Continuous Infusions:   sodium chloride      sodium chloride      sodium chloride      sodium chloride      sodium chloride 50 mL/hr at 25 2120    sodium chloride 5 mL/hr at 25 0559     PRN Meds:.hydrALAZINE, sodium chloride, melatonin, magnesium sulfate, prochlorperazine **OR** prochlorperazine, potassium chloride, sodium chloride, traMADol, sodium chloride, sodium chloride flush, sodium chloride, ALTEplase (CATHFLO) 2 mg in sterile water 2 mL injection, sodium chloride flush, sodium chloride    ROS:  As noted above, otherwise remainder of 10-point ROS negative    Physical Exam:     I&O:    Intake/Output Summary (Last 24 hours) at 2025 0830  Last data filed at 2025 0813  Gross per 24

## 2025-01-30 NOTE — PROGRESS NOTES
Comprehensive Nutrition Assessment    RECOMMENDATIONS:  PO Diet: Regular; low microbial  Nutrition Supplement: Pt family supplying Muscle Milk  Nutrition Education: Education/Counseling initiated (low microbial diet)     NUTRITION ASSESSMENT:   Nutritional summary & status: LOS. New dx APL. Started on arsenic and ATRA. Briefly reviewed low microbial diet. PO intake is variable, likely inadequate. Pt's family reporting that he consumed 2 bites of dinner last night and minimal breakfast this AM. Pt reports adequate appetite, dislikes food at [facility]. Denies n/v/c/d. Overall is a poor historian, but reports weight stablility PTA. Appears well nourished despite sarcopenia. Recommended a protein source at each meal/snack and eating on a schedule/small frequent meals. Pt likes Muscle Milk, family bringing this from home, encouraged to consume inbetween meals. Discussed catabolic disease/need for adequate energy intake. Will follow up.     Admission // PMH: Acute promyelocyticm leukemia // hypothyroidism, Thyroid cancer s/p thyroidectomy, Lewy body dementia, Sensorineural hearing loss bilaterally    MALNUTRITION ASSESSMENT  Context of Malnutrition: Acute Illness (on chronic)   Malnutrition Status: Mild malnutrition  Findings of the 6 clinical characteristics of malnutrition (Minimum of 2 out of 6 clinical characteristics is required to make the diagnosis of moderate or severe Protein Calorie Malnutrition based on AND/ASPEN Guidelines):  Energy Intake:  75% or less of estimated energy requirements for 7 or more days  Weight Loss:  No weight loss     Body Fat Loss:  No body fat loss     Muscle Mass Loss:  No muscle mass loss        NUTRITION DIAGNOSIS   Increased nutrient needs related to increase demand for energy/nutrients as evidenced by APL undergoing ctx, ATRA and arsenic    Nutrition Monitoring and Evaluation:   Food/Nutrient Intake Outcomes:  Progression of Nutrition, Food and Nutrient Intake  Physical Signs/Symptoms

## 2025-01-30 NOTE — PLAN OF CARE
Problem: Safety - Adult  Goal: Free from fall injury  1/30/2025 1316 by Irina Barrios, RN  Outcome: Progressing  Flowsheets (Taken 1/30/2025 1316)  Free From Fall Injury: Instruct family/caregiver on patient safety  Note: Patient in bed with bed alarm on, instructed to call for assistance, verbalizes understanding. Fall precautions in place.       Problem: ABCDS Injury Assessment  Goal: Absence of physical injury  1/30/2025 1316 by Irina Barrios RN  Outcome: Progressing  Flowsheets (Taken 1/30/2025 1316)  Absence of Physical Injury: Implement safety measures based on patient assessment     Problem: Skin/Tissue Integrity - Adult  Goal: Skin integrity remains intact  Outcome: Progressing  Flowsheets (Taken 1/30/2025 1316)  Skin Integrity Remains Intact:   Monitor for areas of redness and/or skin breakdown   Assess vascular access sites hourly     Problem: Gastrointestinal - Adult  Goal: Minimal or absence of nausea and vomiting  Outcome: Progressing  Flowsheets (Taken 1/30/2025 1316)  Minimal or absence of nausea and vomiting:   Administer IV fluids as ordered to ensure adequate hydration   Maintain NPO status until nausea and vomiting are resolved   Nasogastric tube to low intermittent suction as ordered   Administer ordered antiemetic medications as needed   Provide nonpharmacologic comfort measures as appropriate   Advance diet as tolerated, if ordered   Nutrition consult to assist patient with adequate nutrition and appropriate food choices     Problem: Genitourinary - Adult  Goal: Absence of urinary retention  Outcome: Progressing  Flowsheets (Taken 1/30/2025 1316)  Absence of urinary retention:   Assess patient’s ability to void and empty bladder   Monitor intake/output and perform bladder scan as needed   Place urinary catheter per Licensed Independent Practitioner order if needed   Discuss with Licensed Independent Practitioner  medications to alleviate retention as needed   Discuss catheterization for long

## 2025-01-31 LAB
ALBUMIN SERPL-MCNC: 3.6 G/DL (ref 3.4–5)
ALP SERPL-CCNC: 74 U/L (ref 40–129)
ALT SERPL-CCNC: 17 U/L (ref 10–40)
ANION GAP SERPL CALCULATED.3IONS-SCNC: 9 MMOL/L (ref 3–16)
ANISOCYTOSIS BLD QL SMEAR: ABNORMAL
APTT BLD: 35.6 SEC (ref 22.1–36.4)
APTT BLD: 36.3 SEC (ref 22.1–36.4)
AST SERPL-CCNC: 29 U/L (ref 15–37)
BASOPHILS # BLD: 0 K/UL (ref 0–0.2)
BASOPHILS NFR BLD: 0 %
BILIRUB DIRECT SERPL-MCNC: 0.2 MG/DL (ref 0–0.3)
BILIRUB INDIRECT SERPL-MCNC: 0.2 MG/DL (ref 0–1)
BILIRUB SERPL-MCNC: 0.4 MG/DL (ref 0–1)
BUN SERPL-MCNC: 10 MG/DL (ref 7–20)
CALCIUM SERPL-MCNC: 8.2 MG/DL (ref 8.3–10.6)
CHLORIDE SERPL-SCNC: 107 MMOL/L (ref 99–110)
CO2 SERPL-SCNC: 22 MMOL/L (ref 21–32)
CREAT SERPL-MCNC: 0.7 MG/DL (ref 0.8–1.3)
D-DIMER QUANTITATIVE: 2.77 UG/ML FEU (ref 0–0.6)
D-DIMER QUANTITATIVE: 3.08 UG/ML FEU (ref 0–0.6)
DEPRECATED RDW RBC AUTO: 19 % (ref 12.4–15.4)
EOSINOPHIL # BLD: 0 K/UL (ref 0–0.6)
EOSINOPHIL NFR BLD: 0 %
FIBRINOGEN PPP-MCNC: 243 MG/DL (ref 227–534)
FIBRINOGEN PPP-MCNC: 263 MG/DL (ref 227–534)
GFR SERPLBLD CREATININE-BSD FMLA CKD-EPI: >90 ML/MIN/{1.73_M2}
GLUCOSE SERPL-MCNC: 132 MG/DL (ref 70–99)
HCT VFR BLD AUTO: 23.2 % (ref 40.5–52.5)
HGB BLD-MCNC: 8 G/DL (ref 13.5–17.5)
INR PPP: 1.1 (ref 0.85–1.15)
INR PPP: 1.22 (ref 0.85–1.15)
LDH SERPL L TO P-CCNC: 223 U/L (ref 100–190)
LYMPHOCYTES # BLD: 0.4 K/UL (ref 1–5.1)
LYMPHOCYTES NFR BLD: 27 %
MAGNESIUM SERPL-MCNC: 2.28 MG/DL (ref 1.8–2.4)
MCH RBC QN AUTO: 34.5 PG (ref 26–34)
MCHC RBC AUTO-ENTMCNC: 34.6 G/DL (ref 31–36)
MCV RBC AUTO: 99.6 FL (ref 80–100)
METAMYELOCYTES NFR BLD MANUAL: 7 %
MONOCYTES # BLD: 0 K/UL (ref 0–1.3)
MONOCYTES NFR BLD: 2 %
MYELOCYTES NFR BLD MANUAL: 4 %
NEUTROPHILS # BLD: 1.1 K/UL (ref 1.7–7.7)
NEUTROPHILS NFR BLD: 56 %
NEUTS BAND NFR BLD MANUAL: 4 % (ref 0–7)
NRBC BLD-RTO: 1 /100 WBC
PATH INTERP BLD-IMP: NO
PHOSPHATE SERPL-MCNC: 4.2 MG/DL (ref 2.5–4.9)
PLATELET # BLD AUTO: 62 K/UL (ref 135–450)
PMV BLD AUTO: 9.1 FL (ref 5–10.5)
POLYCHROMASIA BLD QL SMEAR: ABNORMAL
POTASSIUM SERPL-SCNC: 3.9 MMOL/L (ref 3.5–5.1)
PROT SERPL-MCNC: 5.6 G/DL (ref 6.4–8.2)
PROTHROMBIN TIME: 14.4 SEC (ref 11.9–14.9)
PROTHROMBIN TIME: 15.6 SEC (ref 11.9–14.9)
RBC # BLD AUTO: 2.33 M/UL (ref 4.2–5.9)
SODIUM SERPL-SCNC: 138 MMOL/L (ref 136–145)
URATE SERPL-MCNC: 3.5 MG/DL (ref 3.5–7.2)
WBC # BLD AUTO: 1.6 K/UL (ref 4–11)

## 2025-01-31 PROCEDURE — 83735 ASSAY OF MAGNESIUM: CPT

## 2025-01-31 PROCEDURE — 2580000003 HC RX 258: Performed by: INTERNAL MEDICINE

## 2025-01-31 PROCEDURE — 2500000003 HC RX 250 WO HCPCS

## 2025-01-31 PROCEDURE — 85610 PROTHROMBIN TIME: CPT

## 2025-01-31 PROCEDURE — 6370000000 HC RX 637 (ALT 250 FOR IP): Performed by: NURSE PRACTITIONER

## 2025-01-31 PROCEDURE — 83615 LACTATE (LD) (LDH) ENZYME: CPT

## 2025-01-31 PROCEDURE — 2580000003 HC RX 258

## 2025-01-31 PROCEDURE — 6360000002 HC RX W HCPCS: Performed by: INTERNAL MEDICINE

## 2025-01-31 PROCEDURE — 97116 GAIT TRAINING THERAPY: CPT

## 2025-01-31 PROCEDURE — 6360000002 HC RX W HCPCS

## 2025-01-31 PROCEDURE — 85379 FIBRIN DEGRADATION QUANT: CPT

## 2025-01-31 PROCEDURE — 36592 COLLECT BLOOD FROM PICC: CPT

## 2025-01-31 PROCEDURE — 85730 THROMBOPLASTIN TIME PARTIAL: CPT

## 2025-01-31 PROCEDURE — 80076 HEPATIC FUNCTION PANEL: CPT

## 2025-01-31 PROCEDURE — 84100 ASSAY OF PHOSPHORUS: CPT

## 2025-01-31 PROCEDURE — 84550 ASSAY OF BLOOD/URIC ACID: CPT

## 2025-01-31 PROCEDURE — 6370000000 HC RX 637 (ALT 250 FOR IP)

## 2025-01-31 PROCEDURE — 80048 BASIC METABOLIC PNL TOTAL CA: CPT

## 2025-01-31 PROCEDURE — 85384 FIBRINOGEN ACTIVITY: CPT

## 2025-01-31 PROCEDURE — 85025 COMPLETE CBC W/AUTO DIFF WBC: CPT

## 2025-01-31 PROCEDURE — 2060000000 HC ICU INTERMEDIATE R&B

## 2025-01-31 PROCEDURE — 6370000000 HC RX 637 (ALT 250 FOR IP): Performed by: STUDENT IN AN ORGANIZED HEALTH CARE EDUCATION/TRAINING PROGRAM

## 2025-01-31 RX ADMIN — VALACYCLOVIR HYDROCHLORIDE 500 MG: 500 TABLET, FILM COATED ORAL at 08:10

## 2025-01-31 RX ADMIN — QUETIAPINE FUMARATE 100 MG: 25 TABLET ORAL at 20:37

## 2025-01-31 RX ADMIN — POTASSIUM CHLORIDE: 2 INJECTION, SOLUTION, CONCENTRATE INTRAVENOUS at 14:55

## 2025-01-31 RX ADMIN — VALACYCLOVIR HYDROCHLORIDE 500 MG: 500 TABLET, FILM COATED ORAL at 20:37

## 2025-01-31 RX ADMIN — POTASSIUM CHLORIDE 20 MEQ: 400 INJECTION, SOLUTION INTRAVENOUS at 08:05

## 2025-01-31 RX ADMIN — TRETINOIN 50 MG: 10 CAPSULE ORAL at 08:12

## 2025-01-31 RX ADMIN — DONEPEZIL HYDROCHLORIDE 10 MG: 10 TABLET ORAL at 08:10

## 2025-01-31 RX ADMIN — POTASSIUM CHLORIDE 20 MEQ: 400 INJECTION, SOLUTION INTRAVENOUS at 09:14

## 2025-01-31 RX ADMIN — TROSPIUM CHLORIDE 20 MG: 20 TABLET, FILM COATED ORAL at 16:03

## 2025-01-31 RX ADMIN — TRETINOIN 50 MG: 10 CAPSULE ORAL at 17:34

## 2025-01-31 RX ADMIN — POTASSIUM CHLORIDE 20 MEQ: 400 INJECTION, SOLUTION INTRAVENOUS at 05:49

## 2025-01-31 RX ADMIN — TROSPIUM CHLORIDE 20 MG: 20 TABLET, FILM COATED ORAL at 05:51

## 2025-01-31 RX ADMIN — HYDROCORTISONE: 1 CREAM TOPICAL at 08:11

## 2025-01-31 RX ADMIN — LEVOFLOXACIN 500 MG: 500 TABLET, FILM COATED ORAL at 08:10

## 2025-01-31 RX ADMIN — CETIRIZINE HYDROCHLORIDE 10 MG: 10 TABLET, FILM COATED ORAL at 20:37

## 2025-01-31 RX ADMIN — FLUCONAZOLE 200 MG: 200 TABLET ORAL at 08:10

## 2025-01-31 RX ADMIN — SODIUM CHLORIDE, PRESERVATIVE FREE 10 ML: 5 INJECTION INTRAVENOUS at 10:02

## 2025-01-31 RX ADMIN — POTASSIUM CHLORIDE 20 MEQ: 400 INJECTION, SOLUTION INTRAVENOUS at 10:29

## 2025-01-31 RX ADMIN — ARSENIC TRIOXIDE 13 MG: 1 INJECTION, SOLUTION INTRAVENOUS at 12:16

## 2025-01-31 RX ADMIN — LEVOTHYROXINE SODIUM 175 MCG: 150 TABLET ORAL at 05:51

## 2025-01-31 RX ADMIN — HYDROCORTISONE: 1 CREAM TOPICAL at 20:37

## 2025-01-31 NOTE — PROGRESS NOTES
[facility] Progress Note    2025     Karan Fortune    MRN: [id number]    : 1950    Referring MD: No referring provider defined for this encounter.      SUBJECTIVE:    -Tolerating chemotherapy well.   -EKG yesterday--> QTc 472 ms    - No complaints offered.    ECOG PS:  (2) Ambulatory and capable of self care, unable to carry out work activity, up and about > 50% or waking hours    KPS: 60% Requires occasional assistance, but is able to care for most of his personal needs    Isolation: None    Medications    Scheduled Meds:   QUEtiapine  100 mg Oral Nightly    hydrocortisone   Topical BID    arsenic trioxide (TRISENOX) 13 mg in sodium chloride 0.9 % 250 mL IVPB  13 mg IntraVENous Q24H    trospium  20 mg Oral BID AC    cetirizine  10 mg Oral Nightly    donepezil  10 mg Oral Daily    sodium chloride flush  5-40 mL IntraVENous 2 times per day    Saline Mouthwash  15 mL Swish & Spit 4x Daily AC & HS    valACYclovir  500 mg Oral BID    fluconazole  200 mg Oral Daily    levoFLOXacin  500 mg Oral Daily    sodium chloride flush  5-40 mL IntraVENous 2 times per day    tretinoin  22.5 mg/m2 Oral Daily with breakfast    And    tretinoin  22.5 mg/m2 Oral Dinner    levothyroxine  175 mcg Oral Daily     Continuous Infusions:   sodium chloride      sodium chloride      sodium chloride      sodium chloride      sodium chloride 50 mL/hr at 25 1722    sodium chloride 5 mL/hr at 25 0559     PRN Meds:.hydrALAZINE, sodium chloride, melatonin, magnesium sulfate, prochlorperazine **OR** prochlorperazine, potassium chloride, sodium chloride, traMADol, sodium chloride, sodium chloride flush, sodium chloride, ALTEplase (CATHFLO) 2 mg in sterile water 2 mL injection, sodium chloride flush, sodium chloride    ROS:  As noted above, otherwise remainder of 10-point ROS negative    Physical Exam:     I&O:    Intake/Output Summary (Last 24 hours) at 2025 0901  Last data filed at 2025 0555  Gross per 24 hour    Post ablative hypothyroidism   -Continue levothyroxine 175 mcg     10. ENT   Septorhinoplasty in 2023  Allergic rhinitis   -Xyzal not on formulary; started zyrtec     11.    Overactive Bladder  -Continue Trospium          - DVT Prophylaxis: Platelets <50,000 cells/dL - prophylactic lovenox on hold and mechanical prophylaxis with bilateral SCDs while in bed in place.  Contraindications to pharmacologic prophylaxis: Thrombocytopenia  Contraindications to mechanical prophylaxis: None    - Disposition: Unknown at this time    The patient was seen and examined by LUISITO Cloud - CNP    Rowdy Decker MD  [facility]  [phone]

## 2025-01-31 NOTE — PROGRESS NOTES
Physical Therapy  Facility/Department: [address]  Physical Therapy Treatment     Name: Karan Fortune  : 1950  MRN: [id number]  Date of Service: 2025    Discharge Recommendations:  24 hour supervision or assist, Home with Home health PT   PT Equipment Recommendations  Equipment Needed:  (cont to assess)      Patient Diagnosis(es): The primary encounter diagnosis was Acute leukemia of unspecified cell type not having achieved remission (HCC). A diagnosis of Leukemia not having achieved remission, unspecified leukemia type (HCC) was also pertinent to this visit.  Past Medical History:  has a past medical history of Thyroid cancer (HCC).  Past Surgical History:  has a past surgical history that includes Total Thyroidectomy (); Hemorrhoid surgery (); Shoulder arthroscopy (); Colonoscopy (); Throat surgery; hernia repair; and Shoulder arthroscopy (12).    Assessment  Body Structures, Functions, Activity Limitations Requiring Skilled Therapeutic Intervention: Decreased functional mobility ;Decreased balance  Assessment: Increased gt/activity tolerance this date.  Continues to be slightly unsteady.  Needing CGA for transfers/gt without AD.  Cues for safety.  Rec cont skilled PT to maximize mobility and independence.  Rec home with 24 hr assist and home PT if not at baseline at d/c.       Plan  Physical Therapy Plan  General Plan:  (2-5)  Current Treatment Recommendations: Balance training, Functional mobility training, Endurance training, Gait training, Safety education & training, Patient/Caregiver education & training  Safety Devices  Type of Devices: Left in chair, Call light within reach, Nurse notified, Sitter present, Telesitter in use (wife present)    Restrictions  Restrictions/Precautions  Activity Level: Up as Tolerated     Subjective  General  Chart Reviewed: Yes  Additional Pertinent Hx: Pt is a 74 y.o. male adm  with APL.  Pt presented to the emergency  department, at the behest of an oncologist in the [facility] system, to be admitted to the [facility] for initiation of therapy for new diagnosis of acute leukemia. He was referred to [facility] hematology department by his PCP for thrombocytopenia and petechiae. He underwent a BMBx on 1/17/25. Dr. De La Cruz called FISH results to Dr. Anne Lundberg, which reportedly show APL. He is admitted for further work-up, treatment and supportive care.     CXR: neg.    PMH: thyroid cancer, followed at , in remission, Lewy body dementia, hypersomnolence, allergic rhinitis and BPH.  Family/Caregiver Present: Yes (wife)  Subjective  Subjective: Pt found standing in bathroom with sitter.  Agreeable to PT.  No c/o pain.  Talkative and easily distracted         Social/Functional History  Social/Functional History  Lives With: Spouse  Type of Home: Apartment (3rd floor apartment)  Home Layout: One level  Home Access: Level entry (long hallway to get to apartment)  Bathroom Shower/Tub: Walk-in shower  Bathroom Toilet: Standard (nothing next to toilet)  Bathroom Equipment: Grab bars in shower  Home Equipment: None  Has the patient had two or more falls in the past year or any fall with injury in the past year?:  (Fell in September - missed step in parking garage.)  Prior Level of Assist for ADLs: Needs assistance (able to shower independently, assist for dressing)  Prior Level of Assist for Homemaking:  (wife performs)  Prior Level of Assist for Ambulation: Independent community ambulator, with or without device  Prior Level of Assist for Transfers: Independent  Active : No  Leisure & Hobbies: watches tv, spending time with cats  Additional Comments: Wife with pt all the time.  2 sons and grandkids in town.  Vision/Hearing  Vision  Vision: Impaired  Vision Exceptions: Wears glasses for reading  Hearing  Hearing: Within functional limits    Cognition   Orientation  Orientation Level: Oriented to person;Oriented to

## 2025-01-31 NOTE — CARE COORDINATION
Spoke with wife while ambulating in the halls. She denied needs for SW. Pt will return home when able with no needs.     SW will follow    PARVIN Aguirre   for [facility] ([facility])  [place] [employer]: [phone]

## 2025-01-31 NOTE — PLAN OF CARE
Problem: Safety - Adult  Goal: Free from fall injury  Outcome: Progressing  Flowsheets (Taken 1/31/2025 1036)  Free From Fall Injury:   Instruct family/caregiver on patient safety   Based on caregiver fall risk screen, instruct family/caregiver to ask for assistance with transferring infant if caregiver noted to have fall risk factors  Note: Patient in bed with bed alarm on, instructed to call for assistance, verbalizes understanding. Fall precautions in place.       Problem: ABCDS Injury Assessment  Goal: Absence of physical injury  Outcome: Progressing  Flowsheets (Taken 1/31/2025 1036)  Absence of Physical Injury: Implement safety measures based on patient assessment     Problem: Neurosensory - Adult  Goal: Achieves stable or improved neurological status  Outcome: Progressing  Flowsheets (Taken 1/31/2025 1036)  Achieves stable or improved neurological status:   Assess for and report changes in neurological status   Initiate measures to prevent increased intracranial pressure   Maintain blood pressure and fluid volume within ordered parameters to optimize cerebral perfusion and minimize risk of hemorrhage   Monitor temperature, glucose, and sodium. Initiate appropriate interventions as ordered     Problem: Skin/Tissue Integrity - Adult  Goal: Skin integrity remains intact  Outcome: Progressing  Flowsheets (Taken 1/31/2025 1036)  Skin Integrity Remains Intact: Monitor for areas of redness and/or skin breakdown     Problem: Infection - Adult  Goal: Absence of infection during hospitalization  Outcome: Progressing  Flowsheets (Taken 1/31/2025 1036)  Absence of infection during hospitalization:   Assess and monitor for signs and symptoms of infection   Monitor lab/diagnostic results   Monitor all insertion sites i.e., indwelling lines, tubes and drains   Administer medications as ordered   Instruct and encourage patient and family to use good hand hygiene technique   Identify and instruct in appropriate isolation

## 2025-02-01 LAB
ALBUMIN SERPL-MCNC: 3.5 G/DL (ref 3.4–5)
ALP SERPL-CCNC: 75 U/L (ref 40–129)
ALT SERPL-CCNC: 16 U/L (ref 10–40)
ANION GAP SERPL CALCULATED.3IONS-SCNC: 8 MMOL/L (ref 3–16)
APTT BLD: 33 SEC (ref 22.1–36.4)
APTT BLD: 35.7 SEC (ref 22.1–36.4)
AST SERPL-CCNC: 28 U/L (ref 15–37)
BASOPHILS # BLD: 0 K/UL (ref 0–0.2)
BASOPHILS NFR BLD: 0 %
BILIRUB DIRECT SERPL-MCNC: 0.2 MG/DL (ref 0–0.3)
BILIRUB INDIRECT SERPL-MCNC: 0.2 MG/DL (ref 0–1)
BILIRUB SERPL-MCNC: 0.4 MG/DL (ref 0–1)
BUN SERPL-MCNC: 8 MG/DL (ref 7–20)
CALCIUM SERPL-MCNC: 8.4 MG/DL (ref 8.3–10.6)
CHLORIDE SERPL-SCNC: 110 MMOL/L (ref 99–110)
CO2 SERPL-SCNC: 22 MMOL/L (ref 21–32)
CREAT SERPL-MCNC: 0.7 MG/DL (ref 0.8–1.3)
D-DIMER QUANTITATIVE: 2.88 UG/ML FEU (ref 0–0.6)
D-DIMER QUANTITATIVE: 2.91 UG/ML FEU (ref 0–0.6)
DACRYOCYTES BLD QL SMEAR: ABNORMAL
DEPRECATED RDW RBC AUTO: 18.3 % (ref 12.4–15.4)
EOSINOPHIL # BLD: 0 K/UL (ref 0–0.6)
EOSINOPHIL NFR BLD: 1 %
FIBRINOGEN PPP-MCNC: 182 MG/DL (ref 227–534)
FIBRINOGEN PPP-MCNC: 200 MG/DL (ref 227–534)
FIBRINOGEN PPP-MCNC: ABNORMAL MG/DL (ref 227–534)
GFR SERPLBLD CREATININE-BSD FMLA CKD-EPI: >90 ML/MIN/{1.73_M2}
GLUCOSE SERPL-MCNC: 108 MG/DL (ref 70–99)
HCT VFR BLD AUTO: 23.6 % (ref 40.5–52.5)
HGB BLD-MCNC: 8.1 G/DL (ref 13.5–17.5)
INR PPP: 1.17 (ref 0.85–1.15)
INR PPP: 1.26 (ref 0.85–1.15)
LDH SERPL L TO P-CCNC: 228 U/L (ref 100–190)
LYMPHOCYTES # BLD: 0.7 K/UL (ref 1–5.1)
LYMPHOCYTES NFR BLD: 33 %
MAGNESIUM SERPL-MCNC: 2.09 MG/DL (ref 1.8–2.4)
MCH RBC QN AUTO: 34.5 PG (ref 26–34)
MCHC RBC AUTO-ENTMCNC: 34.3 G/DL (ref 31–36)
MCV RBC AUTO: 100.7 FL (ref 80–100)
MICROCYTES BLD QL SMEAR: ABNORMAL
MONOCYTES # BLD: 0.8 K/UL (ref 0–1.3)
MONOCYTES NFR BLD: 36 %
NEUTROPHILS # BLD: 0.6 K/UL (ref 1.7–7.7)
NEUTROPHILS NFR BLD: 30 %
OVALOCYTES BLD QL SMEAR: ABNORMAL
PHOSPHATE SERPL-MCNC: 4.1 MG/DL (ref 2.5–4.9)
PLATELET # BLD AUTO: 63 K/UL (ref 135–450)
PLATELET BLD QL SMEAR: ABNORMAL
PMV BLD AUTO: 10 FL (ref 5–10.5)
POTASSIUM SERPL-SCNC: 3.9 MMOL/L (ref 3.5–5.1)
PROT SERPL-MCNC: 5.5 G/DL (ref 6.4–8.2)
PROTHROMBIN TIME: 15.1 SEC (ref 11.9–14.9)
PROTHROMBIN TIME: 16 SEC (ref 11.9–14.9)
RBC # BLD AUTO: 2.35 M/UL (ref 4.2–5.9)
SCHISTOCYTES BLD QL SMEAR: ABNORMAL
SLIDE REVIEW: ABNORMAL
SODIUM SERPL-SCNC: 140 MMOL/L (ref 136–145)
URATE SERPL-MCNC: 3.2 MG/DL (ref 3.5–7.2)
WBC # BLD AUTO: 2.1 K/UL (ref 4–11)

## 2025-02-01 PROCEDURE — 85730 THROMBOPLASTIN TIME PARTIAL: CPT

## 2025-02-01 PROCEDURE — 6360000002 HC RX W HCPCS: Performed by: INTERNAL MEDICINE

## 2025-02-01 PROCEDURE — 6360000002 HC RX W HCPCS

## 2025-02-01 PROCEDURE — 6370000000 HC RX 637 (ALT 250 FOR IP): Performed by: NURSE PRACTITIONER

## 2025-02-01 PROCEDURE — 94761 N-INVAS EAR/PLS OXIMETRY MLT: CPT

## 2025-02-01 PROCEDURE — 2500000003 HC RX 250 WO HCPCS

## 2025-02-01 PROCEDURE — 85384 FIBRINOGEN ACTIVITY: CPT

## 2025-02-01 PROCEDURE — 83615 LACTATE (LD) (LDH) ENZYME: CPT

## 2025-02-01 PROCEDURE — 80048 BASIC METABOLIC PNL TOTAL CA: CPT

## 2025-02-01 PROCEDURE — 2580000003 HC RX 258: Performed by: INTERNAL MEDICINE

## 2025-02-01 PROCEDURE — 83735 ASSAY OF MAGNESIUM: CPT

## 2025-02-01 PROCEDURE — 80076 HEPATIC FUNCTION PANEL: CPT

## 2025-02-01 PROCEDURE — 6370000000 HC RX 637 (ALT 250 FOR IP): Performed by: STUDENT IN AN ORGANIZED HEALTH CARE EDUCATION/TRAINING PROGRAM

## 2025-02-01 PROCEDURE — 6370000000 HC RX 637 (ALT 250 FOR IP)

## 2025-02-01 PROCEDURE — 85025 COMPLETE CBC W/AUTO DIFF WBC: CPT

## 2025-02-01 PROCEDURE — 36592 COLLECT BLOOD FROM PICC: CPT

## 2025-02-01 PROCEDURE — 85379 FIBRIN DEGRADATION QUANT: CPT

## 2025-02-01 PROCEDURE — 85610 PROTHROMBIN TIME: CPT

## 2025-02-01 PROCEDURE — 84100 ASSAY OF PHOSPHORUS: CPT

## 2025-02-01 PROCEDURE — 2060000000 HC ICU INTERMEDIATE R&B

## 2025-02-01 PROCEDURE — 2580000003 HC RX 258

## 2025-02-01 PROCEDURE — 84550 ASSAY OF BLOOD/URIC ACID: CPT

## 2025-02-01 RX ORDER — SODIUM CHLORIDE AND POTASSIUM CHLORIDE 150; 900 MG/100ML; MG/100ML
INJECTION, SOLUTION INTRAVENOUS CONTINUOUS
Status: DISCONTINUED | OUTPATIENT
Start: 2025-02-01 | End: 2025-02-01

## 2025-02-01 RX ADMIN — HYDROCORTISONE: 1 CREAM TOPICAL at 09:35

## 2025-02-01 RX ADMIN — DONEPEZIL HYDROCHLORIDE 10 MG: 10 TABLET ORAL at 09:30

## 2025-02-01 RX ADMIN — FLUCONAZOLE 200 MG: 200 TABLET ORAL at 09:29

## 2025-02-01 RX ADMIN — QUETIAPINE FUMARATE 100 MG: 25 TABLET ORAL at 21:07

## 2025-02-01 RX ADMIN — VALACYCLOVIR HYDROCHLORIDE 500 MG: 500 TABLET, FILM COATED ORAL at 21:07

## 2025-02-01 RX ADMIN — POTASSIUM CHLORIDE: 2 INJECTION, SOLUTION, CONCENTRATE INTRAVENOUS at 10:55

## 2025-02-01 RX ADMIN — POTASSIUM CHLORIDE 20 MEQ: 400 INJECTION, SOLUTION INTRAVENOUS at 14:02

## 2025-02-01 RX ADMIN — LEVOFLOXACIN 500 MG: 500 TABLET, FILM COATED ORAL at 09:29

## 2025-02-01 RX ADMIN — HYDROCORTISONE: 1 CREAM TOPICAL at 21:07

## 2025-02-01 RX ADMIN — POTASSIUM CHLORIDE 20 MEQ: 400 INJECTION, SOLUTION INTRAVENOUS at 10:25

## 2025-02-01 RX ADMIN — TRETINOIN 50 MG: 10 CAPSULE ORAL at 16:22

## 2025-02-01 RX ADMIN — TROSPIUM CHLORIDE 20 MG: 20 TABLET, FILM COATED ORAL at 16:22

## 2025-02-01 RX ADMIN — SODIUM CHLORIDE, PRESERVATIVE FREE 10 ML: 5 INJECTION INTRAVENOUS at 09:33

## 2025-02-01 RX ADMIN — TROSPIUM CHLORIDE 20 MG: 20 TABLET, FILM COATED ORAL at 07:37

## 2025-02-01 RX ADMIN — TRETINOIN 50 MG: 10 CAPSULE ORAL at 09:30

## 2025-02-01 RX ADMIN — CETIRIZINE HYDROCHLORIDE 10 MG: 10 TABLET, FILM COATED ORAL at 21:07

## 2025-02-01 RX ADMIN — VALACYCLOVIR HYDROCHLORIDE 500 MG: 500 TABLET, FILM COATED ORAL at 09:29

## 2025-02-01 RX ADMIN — SODIUM CHLORIDE, PRESERVATIVE FREE 10 ML: 5 INJECTION INTRAVENOUS at 09:29

## 2025-02-01 RX ADMIN — Medication 3 MG: at 21:07

## 2025-02-01 RX ADMIN — POTASSIUM CHLORIDE 20 MEQ: 400 INJECTION, SOLUTION INTRAVENOUS at 11:58

## 2025-02-01 RX ADMIN — LEVOTHYROXINE SODIUM 175 MCG: 150 TABLET ORAL at 07:37

## 2025-02-01 RX ADMIN — POTASSIUM CHLORIDE 20 MEQ: 400 INJECTION, SOLUTION INTRAVENOUS at 09:23

## 2025-02-01 RX ADMIN — ARSENIC TRIOXIDE 13 MG: 1 INJECTION, SOLUTION INTRAVENOUS at 12:09

## 2025-02-01 ASSESSMENT — PAIN SCALES - GENERAL
PAINLEVEL_OUTOF10: 0

## 2025-02-01 NOTE — PROGRESS NOTES
[facility] Progress Note    2025     Karan Fortune    MRN: [id number]    : 1950    Referring MD: No referring provider defined for this encounter.    Interval History:  -Tolerating chemotherapy well.   - EKG yesterday--> QTc 472 ms      SUBJECTIVE:      Daughter at bedside with the patient.  Patient was pleasantly confused.  At times he was cogent.  Denied any complaints.    ECOG PS:  (2) Ambulatory and capable of self care, unable to carry out work activity, up and about > 50% or waking hours    KPS: 60% Requires occasional assistance, but is able to care for most of his personal needs    Isolation: None    Medications    Scheduled Meds:   QUEtiapine  100 mg Oral Nightly    hydrocortisone   Topical BID    arsenic trioxide (TRISENOX) 13 mg in sodium chloride 0.9 % 250 mL IVPB  13 mg IntraVENous Q24H    trospium  20 mg Oral BID AC    cetirizine  10 mg Oral Nightly    donepezil  10 mg Oral Daily    sodium chloride flush  5-40 mL IntraVENous 2 times per day    Saline Mouthwash  15 mL Swish & Spit 4x Daily AC & HS    valACYclovir  500 mg Oral BID    fluconazole  200 mg Oral Daily    levoFLOXacin  500 mg Oral Daily    sodium chloride flush  5-40 mL IntraVENous 2 times per day    tretinoin  22.5 mg/m2 Oral Daily with breakfast    And    tretinoin  22.5 mg/m2 Oral Dinner    levothyroxine  175 mcg Oral Daily     Continuous Infusions:   0.9% NaCl with KCl 20 mEq      sodium chloride      sodium chloride      sodium chloride      sodium chloride      sodium chloride 5 mL/hr at 25 0559     PRN Meds:.hydrALAZINE, sodium chloride, melatonin, magnesium sulfate, prochlorperazine **OR** prochlorperazine, potassium chloride, sodium chloride, traMADol, sodium chloride, sodium chloride flush, sodium chloride, ALTEplase (CATHFLO) 2 mg in sterile water 2 mL injection, sodium chloride flush, sodium chloride    ROS:  As noted above, otherwise remainder of 10-point ROS negative    Physical Exam:     I&O:    Intake/Output      TREATMENT:            -ATRA (started 1/22/25)  -Arsenic (started 1/22/25)      ASSESSMENT AND PLAN:            1.  Acute Promyelocytic Leukemia (APL)      BMBX 1/17/24 ([facility]):markedly increased immature granulocytes, predominantly at the promyelocytes stage, with an immunophenotype suggestive of acute promyelocytic leukemia (APL).  FISH study for AML panel detected PML/CODIE translocation in 121 / 200 cells,   consistent with APL with PML::RATA fusion.  On the bone marrow aspirate smears, the leukemic cells/abnormal granulocytes account for approximately 75% of total cells.       PLAN:    -ATRA  (started 1/21/25) DAY 11  -Arsenic (started 1/22/25) DAY 10     2. ID:  No s/s of infection  - Cont Valtrex, Levaquin and fluconazole ppx        3. Heme:  Pancytopenia r/t APL  - Transfuse for Hgb < 7 and Platelets < 50K (epistaxis and high risk to bleed)   - No transfusion today  Risk for DIC:  - Transfuse Cryoprecipitate if Fibrinogen is <150     4. Metabolic:    - Allopurinol 300 mg PO QD  - NS with 40 KCL at 50 mL/h   - Replace K+ & Mg per PRN orders.     5. GI / Nutrition:    - low microbial diet   - Dietician to follow     6. Cardiac:   -ECHO 9/5/24: LVEF 70%, normal systolic function. Mild mitral regurgitation   -ECHO 1/21/2025:  EF 55-60%  -EKG 1/26/25:  bpm QTc 461ms       PLAN:  -Serial EKGs      7. Pulm:   - Encourage IS & ambulation     8. Neuro:  History of Lewy body dementia diagnosed with positive Lizy scan   -7/21/23 lumbar puncture: AD biomarkers in CSF  -Difficulty with word finding, problems with gait, lacks patience   -Follows with Rafael Jason at    -Continue Aricept  -Continue Seroquel 100 mg PO QHS   Hypersomnolence   -Hold Adderall 20 mg PO QD   Muscle rigidity/ spasms   -OK for baclofen    Confusion/ Delirium   -Neurology consulted 1/27/25:  -Delirium can be associated with Valtrex and psychosis can occasionally be associated with Levaquin.   -Increased nocturnal dose

## 2025-02-01 NOTE — PLAN OF CARE
Problem: Safety - Adult  Goal: Free from fall injury  Outcome: Progressing     Problem: ABCDS Injury Assessment  Goal: Absence of physical injury  Outcome: Progressing     Problem: Respiratory - Adult  Goal: Achieves optimal ventilation and oxygenation  Outcome: Progressing     Problem: Cardiovascular - Adult  Goal: Maintains optimal cardiac output and hemodynamic stability  Outcome: Progressing

## 2025-02-01 NOTE — PLAN OF CARE
Problem: Safety - Adult  Goal: Free from fall injury  Outcome: Progressing  Flowsheets (Taken 2/1/2025 0058)  Free From Fall Injury:   Instruct family/caregiver on patient safety   Based on caregiver fall risk screen, instruct family/caregiver to ask for assistance with transferring infant if caregiver noted to have fall risk factors     Problem: Neurosensory - Adult  Goal: Achieves stable or improved neurological status  Outcome: Progressing  Flowsheets (Taken 2/1/2025 0058)  Achieves stable or improved neurological status: Assess for and report changes in neurological status     Problem: Skin/Tissue Integrity - Adult  Goal: Skin integrity remains intact  Outcome: Progressing  Flowsheets (Taken 2/1/2025 0058)  Skin Integrity Remains Intact: Monitor for areas of redness and/or skin breakdown     Problem: Gastrointestinal - Adult  Goal: Minimal or absence of nausea and vomiting  Outcome: Progressing

## 2025-02-02 LAB
ALBUMIN SERPL-MCNC: 3.7 G/DL (ref 3.4–5)
ALP SERPL-CCNC: 80 U/L (ref 40–129)
ALT SERPL-CCNC: 17 U/L (ref 10–40)
ANION GAP SERPL CALCULATED.3IONS-SCNC: 11 MMOL/L (ref 3–16)
APTT BLD: 34.5 SEC (ref 22.1–36.4)
APTT BLD: 35.9 SEC (ref 22.1–36.4)
AST SERPL-CCNC: 33 U/L (ref 15–37)
BASO STIPL BLD QL SMEAR: ABNORMAL
BASOPHILS # BLD: 0 K/UL (ref 0–0.2)
BASOPHILS NFR BLD: 0 %
BILIRUB DIRECT SERPL-MCNC: 0.2 MG/DL (ref 0–0.3)
BILIRUB INDIRECT SERPL-MCNC: 0.2 MG/DL (ref 0–1)
BILIRUB SERPL-MCNC: 0.4 MG/DL (ref 0–1)
BILIRUB UR QL STRIP.AUTO: NEGATIVE
BLASTS NFR BLD MANUAL: 2 %
BUN SERPL-MCNC: 9 MG/DL (ref 7–20)
CALCIUM SERPL-MCNC: 8.6 MG/DL (ref 8.3–10.6)
CHLORIDE SERPL-SCNC: 107 MMOL/L (ref 99–110)
CLARITY UR: CLEAR
CO2 SERPL-SCNC: 20 MMOL/L (ref 21–32)
COLOR UR: YELLOW
CREAT SERPL-MCNC: 0.7 MG/DL (ref 0.8–1.3)
D-DIMER QUANTITATIVE: 2.91 UG/ML FEU (ref 0–0.6)
D-DIMER QUANTITATIVE: 2.99 UG/ML FEU (ref 0–0.6)
DEPRECATED RDW RBC AUTO: 18.3 % (ref 12.4–15.4)
EOSINOPHIL # BLD: 0 K/UL (ref 0–0.6)
EOSINOPHIL NFR BLD: 0 %
EPI CELLS #/AREA URNS HPF: NORMAL /HPF (ref 0–5)
FIBRINOGEN PPP-MCNC: 242 MG/DL (ref 227–534)
FIBRINOGEN PPP-MCNC: 262 MG/DL (ref 227–534)
GFR SERPLBLD CREATININE-BSD FMLA CKD-EPI: >90 ML/MIN/{1.73_M2}
GLUCOSE SERPL-MCNC: 125 MG/DL (ref 70–99)
GLUCOSE UR STRIP.AUTO-MCNC: NEGATIVE MG/DL
HCT VFR BLD AUTO: 24.4 % (ref 40.5–52.5)
HGB BLD-MCNC: 8.2 G/DL (ref 13.5–17.5)
HGB UR QL STRIP.AUTO: ABNORMAL
INR PPP: 1.19 (ref 0.85–1.15)
INR PPP: 1.26 (ref 0.85–1.15)
KETONES UR STRIP.AUTO-MCNC: NEGATIVE MG/DL
LDH SERPL L TO P-CCNC: 257 U/L (ref 100–190)
LEUKOCYTE ESTERASE UR QL STRIP.AUTO: NEGATIVE
LYMPHOCYTES # BLD: 1 K/UL (ref 1–5.1)
LYMPHOCYTES NFR BLD: 36 %
MACROCYTES BLD QL SMEAR: ABNORMAL
MAGNESIUM SERPL-MCNC: 1.99 MG/DL (ref 1.8–2.4)
MCH RBC QN AUTO: 34.4 PG (ref 26–34)
MCHC RBC AUTO-ENTMCNC: 33.7 G/DL (ref 31–36)
MCV RBC AUTO: 101.9 FL (ref 80–100)
METAMYELOCYTES NFR BLD MANUAL: 8 %
MONOCYTES # BLD: 0 K/UL (ref 0–1.3)
MONOCYTES NFR BLD: 0 %
NEUTROPHILS # BLD: 1.7 K/UL (ref 1.7–7.7)
NEUTROPHILS NFR BLD: 51 %
NEUTS BAND NFR BLD MANUAL: 3 % (ref 0–7)
NITRITE UR QL STRIP.AUTO: NEGATIVE
NRBC BLD-RTO: 3 /100 WBC
PATH INTERP BLD-IMP: NO
PH UR STRIP.AUTO: 6 [PH] (ref 5–8)
PHOSPHATE SERPL-MCNC: 4.2 MG/DL (ref 2.5–4.9)
PLATELET # BLD AUTO: 55 K/UL (ref 135–450)
PLATELET BLD QL SMEAR: ABNORMAL
PMV BLD AUTO: 8.1 FL (ref 5–10.5)
POLYCHROMASIA BLD QL SMEAR: ABNORMAL
POTASSIUM SERPL-SCNC: 4 MMOL/L (ref 3.5–5.1)
PROT SERPL-MCNC: 5.9 G/DL (ref 6.4–8.2)
PROT UR STRIP.AUTO-MCNC: NEGATIVE MG/DL
PROTHROMBIN TIME: 15.3 SEC (ref 11.9–14.9)
PROTHROMBIN TIME: 15.9 SEC (ref 11.9–14.9)
RBC # BLD AUTO: 2.39 M/UL (ref 4.2–5.9)
RBC #/AREA URNS HPF: NORMAL /HPF (ref 0–4)
SLIDE REVIEW: ABNORMAL
SMUDGE CELLS BLD QL SMEAR: PRESENT
SODIUM SERPL-SCNC: 138 MMOL/L (ref 136–145)
SP GR UR STRIP.AUTO: >=1.03 (ref 1–1.03)
UA DIPSTICK W REFLEX MICRO PNL UR: YES
URATE SERPL-MCNC: 3.3 MG/DL (ref 3.5–7.2)
URN SPEC COLLECT METH UR: ABNORMAL
UROBILINOGEN UR STRIP-ACNC: 0.2 E.U./DL
WBC # BLD AUTO: 2.8 K/UL (ref 4–11)
WBC #/AREA URNS HPF: NORMAL /HPF (ref 0–5)

## 2025-02-02 PROCEDURE — 84100 ASSAY OF PHOSPHORUS: CPT

## 2025-02-02 PROCEDURE — 6360000002 HC RX W HCPCS: Performed by: INTERNAL MEDICINE

## 2025-02-02 PROCEDURE — 80048 BASIC METABOLIC PNL TOTAL CA: CPT

## 2025-02-02 PROCEDURE — 87086 URINE CULTURE/COLONY COUNT: CPT

## 2025-02-02 PROCEDURE — 36592 COLLECT BLOOD FROM PICC: CPT

## 2025-02-02 PROCEDURE — 85730 THROMBOPLASTIN TIME PARTIAL: CPT

## 2025-02-02 PROCEDURE — 6370000000 HC RX 637 (ALT 250 FOR IP): Performed by: STUDENT IN AN ORGANIZED HEALTH CARE EDUCATION/TRAINING PROGRAM

## 2025-02-02 PROCEDURE — 2500000003 HC RX 250 WO HCPCS

## 2025-02-02 PROCEDURE — 83735 ASSAY OF MAGNESIUM: CPT

## 2025-02-02 PROCEDURE — 2580000003 HC RX 258

## 2025-02-02 PROCEDURE — 81001 URINALYSIS AUTO W/SCOPE: CPT

## 2025-02-02 PROCEDURE — 85384 FIBRINOGEN ACTIVITY: CPT

## 2025-02-02 PROCEDURE — 84550 ASSAY OF BLOOD/URIC ACID: CPT

## 2025-02-02 PROCEDURE — 85610 PROTHROMBIN TIME: CPT

## 2025-02-02 PROCEDURE — 6370000000 HC RX 637 (ALT 250 FOR IP): Performed by: NURSE PRACTITIONER

## 2025-02-02 PROCEDURE — 83615 LACTATE (LD) (LDH) ENZYME: CPT

## 2025-02-02 PROCEDURE — 85025 COMPLETE CBC W/AUTO DIFF WBC: CPT

## 2025-02-02 PROCEDURE — 6360000002 HC RX W HCPCS

## 2025-02-02 PROCEDURE — 87186 SC STD MICRODIL/AGAR DIL: CPT

## 2025-02-02 PROCEDURE — 80076 HEPATIC FUNCTION PANEL: CPT

## 2025-02-02 PROCEDURE — 6360000002 HC RX W HCPCS: Performed by: NURSE PRACTITIONER

## 2025-02-02 PROCEDURE — 6370000000 HC RX 637 (ALT 250 FOR IP)

## 2025-02-02 PROCEDURE — 2580000003 HC RX 258: Performed by: INTERNAL MEDICINE

## 2025-02-02 PROCEDURE — 87077 CULTURE AEROBIC IDENTIFY: CPT

## 2025-02-02 PROCEDURE — 85379 FIBRIN DEGRADATION QUANT: CPT

## 2025-02-02 PROCEDURE — 2060000000 HC ICU INTERMEDIATE R&B

## 2025-02-02 RX ADMIN — TROSPIUM CHLORIDE 20 MG: 20 TABLET, FILM COATED ORAL at 06:48

## 2025-02-02 RX ADMIN — POTASSIUM CHLORIDE 20 MEQ: 400 INJECTION, SOLUTION INTRAVENOUS at 12:02

## 2025-02-02 RX ADMIN — DONEPEZIL HYDROCHLORIDE 10 MG: 10 TABLET ORAL at 09:15

## 2025-02-02 RX ADMIN — POTASSIUM CHLORIDE 20 MEQ: 400 INJECTION, SOLUTION INTRAVENOUS at 14:23

## 2025-02-02 RX ADMIN — LEVOFLOXACIN 500 MG: 500 TABLET, FILM COATED ORAL at 09:14

## 2025-02-02 RX ADMIN — POTASSIUM CHLORIDE: 2 INJECTION, SOLUTION, CONCENTRATE INTRAVENOUS at 10:46

## 2025-02-02 RX ADMIN — HYDROCORTISONE: 1 CREAM TOPICAL at 09:17

## 2025-02-02 RX ADMIN — CETIRIZINE HYDROCHLORIDE 10 MG: 10 TABLET, FILM COATED ORAL at 20:07

## 2025-02-02 RX ADMIN — Medication 3 MG: at 20:07

## 2025-02-02 RX ADMIN — POTASSIUM CHLORIDE 20 MEQ: 400 INJECTION, SOLUTION INTRAVENOUS at 10:48

## 2025-02-02 RX ADMIN — QUETIAPINE FUMARATE 100 MG: 25 TABLET ORAL at 20:07

## 2025-02-02 RX ADMIN — ARSENIC TRIOXIDE 13 MG: 1 INJECTION, SOLUTION INTRAVENOUS at 11:55

## 2025-02-02 RX ADMIN — FLUCONAZOLE 200 MG: 200 TABLET ORAL at 09:14

## 2025-02-02 RX ADMIN — TRETINOIN 50 MG: 10 CAPSULE ORAL at 09:15

## 2025-02-02 RX ADMIN — POTASSIUM CHLORIDE: 2 INJECTION, SOLUTION, CONCENTRATE INTRAVENOUS at 20:28

## 2025-02-02 RX ADMIN — POTASSIUM CHLORIDE 20 MEQ: 400 INJECTION, SOLUTION INTRAVENOUS at 08:35

## 2025-02-02 RX ADMIN — MAGNESIUM SULFATE HEPTAHYDRATE 4000 MG: 40 INJECTION, SOLUTION INTRAVENOUS at 04:17

## 2025-02-02 RX ADMIN — LEVOTHYROXINE SODIUM 175 MCG: 150 TABLET ORAL at 06:48

## 2025-02-02 RX ADMIN — VALACYCLOVIR HYDROCHLORIDE 500 MG: 500 TABLET, FILM COATED ORAL at 09:14

## 2025-02-02 RX ADMIN — TROSPIUM CHLORIDE 20 MG: 20 TABLET, FILM COATED ORAL at 16:26

## 2025-02-02 RX ADMIN — VALACYCLOVIR HYDROCHLORIDE 500 MG: 500 TABLET, FILM COATED ORAL at 20:07

## 2025-02-02 RX ADMIN — POTASSIUM CHLORIDE: 2 INJECTION, SOLUTION, CONCENTRATE INTRAVENOUS at 08:34

## 2025-02-02 RX ADMIN — SODIUM CHLORIDE, PRESERVATIVE FREE 10 ML: 5 INJECTION INTRAVENOUS at 09:17

## 2025-02-02 RX ADMIN — TRETINOIN 50 MG: 10 CAPSULE ORAL at 16:26

## 2025-02-02 NOTE — ONCOLOGY
Administration: Chemotherapy drug Arsenic independently verified with Lillie Ortega RN prior to administration.  Acknowledgement of informed consent for chemotherapy administration verified.  Original order, appropriateness of regimen, drug supplied, height, weight, BSA, dose calculations, expiration dates/times, drug appearance, and two patient identifiers were verified by both RNs.  Drug checked for vesicant/irritant status and for risk of hypersensitivity.  Most recent laboratory values and allergies, were reviewed.  Positive, brisk blood return via CVC was confirmed prior to administration. Chest x-ray for correct line placement reviewed. Kavya Mccurdy RN and Lillie Ortega RN verified correct rate of chemotherapy and maintenance IV fluids.  Patient was educated on chemotherapy regimen prior to administration including indication for treatment related to disease & side effects of chemotherapy drug.  Patient verbalizes understanding of all instructions.      Completion of Chemotherapy: Monitoring during infusion done per policy, see Flowsheets.  Blood return verified before, during, and after infusion per policy; no signs of extravasation.  Pt tolerated chemotherapy well and without incident.  Chemotherapy infusion end time on the MAR.

## 2025-02-02 NOTE — PLAN OF CARE
Problem: Safety - Adult  Goal: Free from fall injury  2/2/2025 1545 by Kavya Mccurdy, RN  Outcome: Progressing     Problem: ABCDS Injury Assessment  Goal: Absence of physical injury  2/2/2025 1545 by Kavya Mccurdy, RN  Outcome: Progressing     Problem: Respiratory - Adult  Goal: Achieves optimal ventilation and oxygenation  Outcome: Progressing

## 2025-02-02 NOTE — PROGRESS NOTES
[facility] Progress Note    2025     Karan Fortune    MRN: [id number]    : 1950    Referring MD: No referring provider defined for this encounter.    Interval History:  -Tolerating chemotherapy well.      SUBJECTIVE:    The patient reports frequent urination.  He could not tell me if it was worse than it has been for him in the past or if there has been any change over time.  He denies any pain.  He feels well.  He was up brushing his teeth.  The PCA who is with him was unfamiliar with how he was at baseline so could not give us any further insight    ECOG PS:  (2) Ambulatory and capable of self care, unable to carry out work activity, up and about > 50% or waking hours    KPS: 60% Requires occasional assistance, but is able to care for most of his personal needs    Isolation: None    Medications    Scheduled Meds:   QUEtiapine  100 mg Oral Nightly    hydrocortisone   Topical BID    arsenic trioxide (TRISENOX) 13 mg in sodium chloride 0.9 % 250 mL IVPB  13 mg IntraVENous Q24H    trospium  20 mg Oral BID AC    cetirizine  10 mg Oral Nightly    donepezil  10 mg Oral Daily    sodium chloride flush  5-40 mL IntraVENous 2 times per day    Saline Mouthwash  15 mL Swish & Spit 4x Daily AC & HS    valACYclovir  500 mg Oral BID    fluconazole  200 mg Oral Daily    levoFLOXacin  500 mg Oral Daily    sodium chloride flush  5-40 mL IntraVENous 2 times per day    tretinoin  22.5 mg/m2 Oral Daily with breakfast    And    tretinoin  22.5 mg/m2 Oral Dinner    levothyroxine  175 mcg Oral Daily     Continuous Infusions:   potassium chloride 40 mEq in sodium chloride 0.9 % 1,000 mL infusion 50 mL/hr at 25 1055    sodium chloride      sodium chloride      sodium chloride      sodium chloride      sodium chloride 5 mL/hr at 25 0559     PRN Meds:.hydrALAZINE, sodium chloride, melatonin, magnesium sulfate, prochlorperazine **OR** prochlorperazine, potassium chloride, sodium chloride, traMADol, sodium chloride,  Adderall 20 mg PO QD   Muscle rigidity/ spasms   -OK for baclofen    Confusion/ Delirium   -Neurology consulted 1/27/25:  -Delirium can be associated with Valtrex and psychosis can occasionally be associated with Levaquin.   -Increased nocturnal dose of Seroquel to 100mg (1/28/25)    9. Endocrine   History of Thyroid Cancer s/p thyroidectomy   Post ablative hypothyroidism   -Continue levothyroxine 175 mcg     10. ENT   - Septorhinoplasty in 2023  Allergic rhinitis   -Xyzal not on formulary; started zyrtec     11.    Overactive Bladder  -Continue Trospium        - DVT Prophylaxis: Platelets <50,000 cells/dL - prophylactic lovenox on hold and mechanical prophylaxis with bilateral SCDs while in bed in place.  Contraindications to pharmacologic prophylaxis: Thrombocytopenia  Contraindications to mechanical prophylaxis: None    - Disposition: Unknown at this time    The patient was seen and examined by Dr. Waterhouse.      Indy Nolan, APRN - CNP    Nursing reports that the patient was slightly more confused but this may be a function of his baseline dementia.  Will check UA CNS.  Will hold off on antibiotics.  Patient clinically appears well and some of this disorientation may simply be a function of hospitalization    David M. Waterhouse, M.D., MPH    [facility] ([facility] [place] [address]  Office [phone]  Cell [phone]  [email or web address]     \"Participating in a clinical trial is the first step to fighting cancer; not the last.\"

## 2025-02-02 NOTE — PLAN OF CARE
Problem: Safety - Adult  Goal: Free from fall injury  2/2/2025 0147 by Jenae Youngblood RN  Outcome: Progressing  Flowsheets (Taken 2/2/2025 0146)  Free From Fall Injury:   Instruct family/caregiver on patient safety   Based on caregiver fall risk screen, instruct family/caregiver to ask for assistance with transferring infant if caregiver noted to have fall risk factors     Problem: ABCDS Injury Assessment  Goal: Absence of physical injury  2/2/2025 0147 by Jenae Youngblood, RN  Outcome: Progressing  Flowsheets (Taken 2/2/2025 0146)  Absence of Physical Injury: Implement safety measures based on patient assessment     Problem: Musculoskeletal - Adult  Goal: Return mobility to safest level of function  Outcome: Progressing  Flowsheets (Taken 2/2/2025 0147)  Return Mobility to Safest Level of Function:   Assess patient stability and activity tolerance for standing, transferring and ambulating with or without assistive devices   Instruct patient/family in ordered activity level   Assist with transfers and ambulation using safe patient handling equipment as needed     Problem: Gastrointestinal - Adult  Goal: Minimal or absence of nausea and vomiting  Outcome: Progressing     Problem: Infection - Adult  Goal: Absence of infection at discharge  Outcome: Progressing  Flowsheets (Taken 2/2/2025 0147)  Absence of infection at discharge:   Assess and monitor for signs and symptoms of infection   Monitor lab/diagnostic results   Monitor all insertion sites i.e., indwelling lines, tubes and drains

## 2025-02-03 ENCOUNTER — APPOINTMENT (OUTPATIENT)
Dept: GENERAL RADIOLOGY | Age: 75
DRG: 835 | End: 2025-02-03
Payer: MEDICARE

## 2025-02-03 LAB
ALBUMIN SERPL-MCNC: 3.9 G/DL (ref 3.4–5)
ALP SERPL-CCNC: 84 U/L (ref 40–129)
ALT SERPL-CCNC: 18 U/L (ref 10–40)
ANION GAP SERPL CALCULATED.3IONS-SCNC: 11 MMOL/L (ref 3–16)
APTT BLD: 34.3 SEC (ref 22.1–36.4)
APTT BLD: 36.3 SEC (ref 22.1–36.4)
AST SERPL-CCNC: 33 U/L (ref 15–37)
BASOPHILS # BLD: 0 K/UL (ref 0–0.2)
BASOPHILS NFR BLD: 1 %
BILIRUB DIRECT SERPL-MCNC: 0.2 MG/DL (ref 0–0.3)
BILIRUB INDIRECT SERPL-MCNC: 0.3 MG/DL (ref 0–1)
BILIRUB SERPL-MCNC: 0.5 MG/DL (ref 0–1)
BLASTS NFR BLD MANUAL: 1 %
BUN SERPL-MCNC: 7 MG/DL (ref 7–20)
CALCIUM SERPL-MCNC: 8.5 MG/DL (ref 8.3–10.6)
CHLORIDE SERPL-SCNC: 107 MMOL/L (ref 99–110)
CO2 SERPL-SCNC: 20 MMOL/L (ref 21–32)
CREAT SERPL-MCNC: 0.7 MG/DL (ref 0.8–1.3)
D-DIMER QUANTITATIVE: 2.94 UG/ML FEU (ref 0–0.6)
D-DIMER QUANTITATIVE: 2.96 UG/ML FEU (ref 0–0.6)
DEPRECATED RDW RBC AUTO: 18.4 % (ref 12.4–15.4)
EKG ATRIAL RATE: 97 BPM
EKG DIAGNOSIS: NORMAL
EKG P AXIS: 48 DEGREES
EKG P-R INTERVAL: 154 MS
EKG Q-T INTERVAL: 368 MS
EKG QRS DURATION: 68 MS
EKG QTC CALCULATION (BAZETT): 467 MS
EKG R AXIS: 5 DEGREES
EKG T AXIS: 23 DEGREES
EKG VENTRICULAR RATE: 97 BPM
EOSINOPHIL # BLD: 0 K/UL (ref 0–0.6)
EOSINOPHIL NFR BLD: 0 %
FIBRINOGEN PPP-MCNC: 244 MG/DL (ref 227–534)
FIBRINOGEN PPP-MCNC: 251 MG/DL (ref 227–534)
GFR SERPLBLD CREATININE-BSD FMLA CKD-EPI: >90 ML/MIN/{1.73_M2}
GLUCOSE SERPL-MCNC: 110 MG/DL (ref 70–99)
HCT VFR BLD AUTO: 23.9 % (ref 40.5–52.5)
HGB BLD-MCNC: 8.3 G/DL (ref 13.5–17.5)
INR PPP: 1.2 (ref 0.85–1.15)
INR PPP: 1.21 (ref 0.85–1.15)
LDH SERPL L TO P-CCNC: 245 U/L (ref 100–190)
LYMPHOCYTES # BLD: 1.2 K/UL (ref 1–5.1)
LYMPHOCYTES NFR BLD: 40 %
MACROCYTES BLD QL SMEAR: ABNORMAL
MAGNESIUM SERPL-MCNC: 2.39 MG/DL (ref 1.8–2.4)
MCH RBC QN AUTO: 34.9 PG (ref 26–34)
MCHC RBC AUTO-ENTMCNC: 34.7 G/DL (ref 31–36)
MCV RBC AUTO: 100.7 FL (ref 80–100)
METAMYELOCYTES NFR BLD MANUAL: 3 %
MONOCYTES # BLD: 0.2 K/UL (ref 0–1.3)
MONOCYTES NFR BLD: 7 %
NEUTROPHILS # BLD: 1.5 K/UL (ref 1.7–7.7)
NEUTROPHILS NFR BLD: 45 %
NEUTS BAND NFR BLD MANUAL: 3 % (ref 0–7)
NRBC BLD-RTO: 4 /100 WBC
PATH INTERP BLD-IMP: YES
PELGER HUET CELLS BLD QL SMEAR: PRESENT
PHOSPHATE SERPL-MCNC: 4.2 MG/DL (ref 2.5–4.9)
PLATELET # BLD AUTO: 61 K/UL (ref 135–450)
PLATELET BLD QL SMEAR: ABNORMAL
PMV BLD AUTO: 9.6 FL (ref 5–10.5)
POLYCHROMASIA BLD QL SMEAR: ABNORMAL
POTASSIUM SERPL-SCNC: 4.3 MMOL/L (ref 3.5–5.1)
PROT SERPL-MCNC: 6.1 G/DL (ref 6.4–8.2)
PROTHROMBIN TIME: 15.4 SEC (ref 11.9–14.9)
PROTHROMBIN TIME: 15.5 SEC (ref 11.9–14.9)
RBC # BLD AUTO: 2.37 M/UL (ref 4.2–5.9)
SLIDE REVIEW: ABNORMAL
SMUDGE CELLS BLD QL SMEAR: PRESENT
SODIUM SERPL-SCNC: 138 MMOL/L (ref 136–145)
TOXIC GRANULES BLD QL SMEAR: PRESENT
URATE SERPL-MCNC: 3.6 MG/DL (ref 3.5–7.2)
WBC # BLD AUTO: 3 K/UL (ref 4–11)

## 2025-02-03 PROCEDURE — 6370000000 HC RX 637 (ALT 250 FOR IP)

## 2025-02-03 PROCEDURE — 2580000003 HC RX 258

## 2025-02-03 PROCEDURE — 85384 FIBRINOGEN ACTIVITY: CPT

## 2025-02-03 PROCEDURE — 36592 COLLECT BLOOD FROM PICC: CPT

## 2025-02-03 PROCEDURE — 71045 X-RAY EXAM CHEST 1 VIEW: CPT

## 2025-02-03 PROCEDURE — 85610 PROTHROMBIN TIME: CPT

## 2025-02-03 PROCEDURE — 6360000002 HC RX W HCPCS: Performed by: INTERNAL MEDICINE

## 2025-02-03 PROCEDURE — 93005 ELECTROCARDIOGRAM TRACING: CPT | Performed by: INTERNAL MEDICINE

## 2025-02-03 PROCEDURE — 80048 BASIC METABOLIC PNL TOTAL CA: CPT

## 2025-02-03 PROCEDURE — 84100 ASSAY OF PHOSPHORUS: CPT

## 2025-02-03 PROCEDURE — 2060000000 HC ICU INTERMEDIATE R&B

## 2025-02-03 PROCEDURE — 2500000003 HC RX 250 WO HCPCS

## 2025-02-03 PROCEDURE — 85379 FIBRIN DEGRADATION QUANT: CPT

## 2025-02-03 PROCEDURE — 80076 HEPATIC FUNCTION PANEL: CPT

## 2025-02-03 PROCEDURE — 2580000003 HC RX 258: Performed by: INTERNAL MEDICINE

## 2025-02-03 PROCEDURE — 84550 ASSAY OF BLOOD/URIC ACID: CPT

## 2025-02-03 PROCEDURE — 93010 ELECTROCARDIOGRAM REPORT: CPT | Performed by: INTERNAL MEDICINE

## 2025-02-03 PROCEDURE — 83735 ASSAY OF MAGNESIUM: CPT

## 2025-02-03 PROCEDURE — 6360000002 HC RX W HCPCS

## 2025-02-03 PROCEDURE — 97116 GAIT TRAINING THERAPY: CPT

## 2025-02-03 PROCEDURE — 85025 COMPLETE CBC W/AUTO DIFF WBC: CPT

## 2025-02-03 PROCEDURE — 85730 THROMBOPLASTIN TIME PARTIAL: CPT

## 2025-02-03 PROCEDURE — 6370000000 HC RX 637 (ALT 250 FOR IP): Performed by: STUDENT IN AN ORGANIZED HEALTH CARE EDUCATION/TRAINING PROGRAM

## 2025-02-03 PROCEDURE — 83615 LACTATE (LD) (LDH) ENZYME: CPT

## 2025-02-03 PROCEDURE — 6370000000 HC RX 637 (ALT 250 FOR IP): Performed by: NURSE PRACTITIONER

## 2025-02-03 RX ORDER — BENZONATATE 100 MG/1
100 CAPSULE ORAL 3 TIMES DAILY PRN
Status: DISCONTINUED | OUTPATIENT
Start: 2025-02-03 | End: 2025-02-13 | Stop reason: HOSPADM

## 2025-02-03 RX ADMIN — DONEPEZIL HYDROCHLORIDE 10 MG: 10 TABLET ORAL at 09:24

## 2025-02-03 RX ADMIN — TRETINOIN 50 MG: 10 CAPSULE ORAL at 17:27

## 2025-02-03 RX ADMIN — SODIUM CHLORIDE, PRESERVATIVE FREE 10 ML: 5 INJECTION INTRAVENOUS at 09:24

## 2025-02-03 RX ADMIN — HYDROCORTISONE: 1 CREAM TOPICAL at 09:24

## 2025-02-03 RX ADMIN — FLUCONAZOLE 200 MG: 200 TABLET ORAL at 09:12

## 2025-02-03 RX ADMIN — CETIRIZINE HYDROCHLORIDE 10 MG: 10 TABLET, FILM COATED ORAL at 20:22

## 2025-02-03 RX ADMIN — BENZONATATE 100 MG: 100 CAPSULE ORAL at 20:21

## 2025-02-03 RX ADMIN — LEVOTHYROXINE SODIUM 175 MCG: 150 TABLET ORAL at 07:50

## 2025-02-03 RX ADMIN — TRETINOIN 50 MG: 10 CAPSULE ORAL at 09:12

## 2025-02-03 RX ADMIN — VALACYCLOVIR HYDROCHLORIDE 500 MG: 500 TABLET, FILM COATED ORAL at 20:22

## 2025-02-03 RX ADMIN — LEVOFLOXACIN 500 MG: 500 TABLET, FILM COATED ORAL at 09:12

## 2025-02-03 RX ADMIN — TROSPIUM CHLORIDE 20 MG: 20 TABLET, FILM COATED ORAL at 17:27

## 2025-02-03 RX ADMIN — SODIUM CHLORIDE, PRESERVATIVE FREE 10 ML: 5 INJECTION INTRAVENOUS at 09:25

## 2025-02-03 RX ADMIN — POTASSIUM CHLORIDE: 2 INJECTION, SOLUTION, CONCENTRATE INTRAVENOUS at 14:57

## 2025-02-03 RX ADMIN — QUETIAPINE FUMARATE 100 MG: 25 TABLET ORAL at 20:21

## 2025-02-03 RX ADMIN — Medication 6 MG: at 20:28

## 2025-02-03 RX ADMIN — VALACYCLOVIR HYDROCHLORIDE 500 MG: 500 TABLET, FILM COATED ORAL at 09:12

## 2025-02-03 RX ADMIN — TROSPIUM CHLORIDE 20 MG: 20 TABLET, FILM COATED ORAL at 07:50

## 2025-02-03 RX ADMIN — ARSENIC TRIOXIDE 13 MG: 1 INJECTION, SOLUTION INTRAVENOUS at 12:22

## 2025-02-03 RX ADMIN — HYDROCORTISONE: 1 CREAM TOPICAL at 20:21

## 2025-02-03 ASSESSMENT — PAIN SCALES - GENERAL
PAINLEVEL_OUTOF10: 0

## 2025-02-03 NOTE — PROGRESS NOTES
Physical Therapy  Facility/Department: [address]  Physical Therapy Treatment    Name: Karan Fortune  : 1950  MRN: [id number]  Date of Service: 2/3/2025    Discharge Recommendations:  24 hour supervision or assist, Home with Home health PT   PT Equipment Recommendations  Equipment Needed: No (cont to assess)      Patient Diagnosis(es): The primary encounter diagnosis was Acute leukemia of unspecified cell type not having achieved remission (HCC). A diagnosis of Leukemia not having achieved remission, unspecified leukemia type (HCC) was also pertinent to this visit.  Past Medical History:  has a past medical history of Thyroid cancer (HCC).  Past Surgical History:  has a past surgical history that includes Total Thyroidectomy (); Hemorrhoid surgery (); Shoulder arthroscopy (); Colonoscopy (); Throat surgery; hernia repair; and Shoulder arthroscopy (12).    Assessment  Body Structures, Functions, Activity Limitations Requiring Skilled Therapeutic Intervention: Decreased functional mobility ;Decreased balance  Assessment: Pt SBA for transfers and CGA for amb without AD. Cues for safety. Pt would benefit from further skilled PT to maximize safety and independence with functional mobility. Will continue to follow.  Treatment Diagnosis: Decreased functional mobility  Activity Tolerance  Activity Tolerance: Patient tolerated treatment well    Plan  Physical Therapy Plan  General Plan:  (2-5)  Current Treatment Recommendations: Balance training, Functional mobility training, Endurance training, Gait training, Safety education & training, Patient/Caregiver education & training  Safety Devices  Type of Devices: Left in chair, Call light within reach, Nurse notified, Sitter present, Telesitter in use (wife present)    Restrictions  Restrictions/Precautions  Activity Level: Up as Tolerated     Subjective  General  Chart Reviewed: Yes  Additional Pertinent Hx: Pt is a 74 y.o. male  adm 1/21 with APL.  Pt presented to the emergency department, at the behest of an oncologist in the [facility] system, to be admitted to the [facility] for initiation of therapy for new diagnosis of acute leukemia. He was referred to The [facility] hematology department by his PCP for thrombocytopenia and petechiae. He underwent a BMBx on 1/17/25. Dr. De La Cruz called FISH results to Dr. Anne Lundberg, which reportedly show APL. He is admitted for further work-up, treatment and supportive care.     CXR: neg.    PMH: thyroid cancer, followed at , in remission, Lewy body dementia, hypersomnolence, allergic rhinitis and BPH.  Family/Caregiver Present: Yes (wife)  Diagnosis: APL  Follows Commands: Within Functional Limits  Subjective  Subjective: Pt found sitting up chair upon arrival, denying pain and agreeable to therapy.         Social/Functional History  Social/Functional History  Lives With: Spouse  Type of Home: Apartment (3rd floor apartment)  Home Layout: One level  Home Access: Level entry (long hallway to get to apartment)  Bathroom Shower/Tub: Walk-in shower  Bathroom Toilet: Standard (nothing next to toilet)  Bathroom Equipment: Grab bars in shower  Home Equipment: None  Has the patient had two or more falls in the past year or any fall with injury in the past year?:  (Fell in September - missed step in parking garage.)  Prior Level of Assist for ADLs: Needs assistance (able to shower independently, assist for dressing)  Prior Level of Assist for Homemaking:  (wife performs)  Prior Level of Assist for Ambulation: Independent community ambulator, with or without device  Prior Level of Assist for Transfers: Independent  Active : No  Leisure & Hobbies: watches tv, spending time with cats  Additional Comments: Wife with pt all the time.  2 sons and grandkids in town.    Cognition   Orientation  Overall Orientation Status: Impaired  Orientation Level: Oriented to person;Oriented to

## 2025-02-03 NOTE — CARE COORDINATION
Spoke with pt and wife at bedside. No needs identified for SW.     Pt will return home with his wife when discharged with no anticipated SW needs     SW will follow    Geraldine DE LA O, PARVIN   for [facility] ([facility])  [place] [employer]: [phone]

## 2025-02-03 NOTE — PROGRESS NOTES
[facility] Progress Note    2/3/2025     Karan Fortune    MRN: [id number]    : 1950    Referring MD: No referring provider defined for this encounter.    Interval History:  -Tolerating chemotherapy well.   -Circulating 1% peripheral blasts on diff   -No new complaints     ECOG PS:  (2) Ambulatory and capable of self care, unable to carry out work activity, up and about > 50% or waking hours    KPS: 60% Requires occasional assistance, but is able to care for most of his personal needs    Isolation: None    Medications    Scheduled Meds:   QUEtiapine  100 mg Oral Nightly    hydrocortisone   Topical BID    arsenic trioxide (TRISENOX) 13 mg in sodium chloride 0.9 % 250 mL IVPB  13 mg IntraVENous Q24H    trospium  20 mg Oral BID AC    cetirizine  10 mg Oral Nightly    donepezil  10 mg Oral Daily    sodium chloride flush  5-40 mL IntraVENous 2 times per day    Saline Mouthwash  15 mL Swish & Spit 4x Daily AC & HS    valACYclovir  500 mg Oral BID    fluconazole  200 mg Oral Daily    levoFLOXacin  500 mg Oral Daily    sodium chloride flush  5-40 mL IntraVENous 2 times per day    tretinoin  22.5 mg/m2 Oral Daily with breakfast    And    tretinoin  22.5 mg/m2 Oral Dinner    levothyroxine  175 mcg Oral Daily     Continuous Infusions:   potassium chloride 40 mEq, magnesium sulfate 1,000 mg in sodium chloride 0.9 % 1,000 mL infusion 50 mL/hr at 25    sodium chloride      sodium chloride      sodium chloride      sodium chloride      sodium chloride 5 mL/hr at 25 0559     PRN Meds:.hydrALAZINE, sodium chloride, melatonin, magnesium sulfate, prochlorperazine **OR** prochlorperazine, potassium chloride, sodium chloride, traMADol, sodium chloride, sodium chloride flush, sodium chloride, ALTEplase (CATHFLO) 2 mg in sterile water 2 mL injection, sodium chloride flush, sodium chloride    ROS:  As noted above, otherwise remainder of 10-point ROS negative    Physical Exam:     I&O:    Intake/Output Summary (Last  of Seroquel to 100mg (1/28/25)    9. Endocrine   History of Thyroid Cancer s/p thyroidectomy   Post ablative hypothyroidism   -Continue levothyroxine 175 mcg     10. ENT   - Septorhinoplasty in 2023  Allergic rhinitis   -Xyzal not on formulary; started zyrtec     11.    Overactive Bladder  -Continue Trospium        - DVT Prophylaxis: Platelets <50,000 cells/dL - prophylactic lovenox on hold and mechanical prophylaxis with bilateral SCDs while in bed in place.  Contraindications to pharmacologic prophylaxis: Thrombocytopenia  Contraindications to mechanical prophylaxis: None    - Disposition: Unknown at this time    The patient was seen and examined by Dr. Brianne Alvarado, APRN - CNP    Rowdy Decker MD  [facility]  [phone]

## 2025-02-03 NOTE — PLAN OF CARE
Problem: Safety - Adult  Goal: Free from fall injury  2/3/2025 0058 by Jenae Youngblood RN  Outcome: Progressing  Flowsheets (Taken 2/3/2025 0058)  Free From Fall Injury:   Instruct family/caregiver on patient safety   Based on caregiver fall risk screen, instruct family/caregiver to ask for assistance with transferring infant if caregiver noted to have fall risk factors     Problem: ABCDS Injury Assessment  Goal: Absence of physical injury  2/3/2025 0058 by Jenae Youngblood RN  Outcome: Progressing  Flowsheets (Taken 2/3/2025 0058)  Absence of Physical Injury: Implement safety measures based on patient assessment     Problem: Respiratory - Adult  Goal: Achieves optimal ventilation and oxygenation  2/3/2025 0058 by Jenae Youngblood RN  Outcome: Progressing  Flowsheets (Taken 2/3/2025 0058)  Achieves optimal ventilation and oxygenation:   Assess for changes in respiratory status   Assess for changes in mentation and behavior     Problem: Skin/Tissue Integrity - Adult  Goal: Skin integrity remains intact  Outcome: Progressing  Flowsheets (Taken 2/3/2025 0058)  Skin Integrity Remains Intact: Monitor for areas of redness and/or skin breakdown     Problem: Infection - Adult  Goal: Absence of infection during hospitalization  Outcome: Progressing  Flowsheets (Taken 2/3/2025 0058)  Absence of infection during hospitalization:   Assess and monitor for signs and symptoms of infection   Monitor lab/diagnostic results   Monitor all insertion sites i.e., indwelling lines, tubes and drains     Problem: Gastrointestinal - Adult  Goal: Minimal or absence of nausea and vomiting  Outcome: Progressing

## 2025-02-03 NOTE — PROGRESS NOTES
Central line dressing changed using sterile technique following hospital policy. She Awad RN, observed with procedure to ensure proper technique.

## 2025-02-03 NOTE — PROGRESS NOTES
Physician Progress Note      PATIENT:               RIKI FANG  [facility] #:                  [id number]  :                       1950  ADMIT DATE:       2025 3:40 PM  DISCH DATE:  RESPONDING  PROVIDER #:        DARREN MORRISON          QUERY TEXT:    Pt admitted with leukemia and has malnutrition documented  by Registered   Dietician. Please further specify type of malnutrition with documentation in   the medical record.    The medical record reflects the following:  Risk Factors: 75 yo w/ leukemia getting chemo, dementia  Clinical Indicators: Per RD : Mild malnutrition,  75% or less of estimated   energy requirements for 7 or more days  Treatment: Low microbial diet, muscle milk, RD monitoring    ASPEN Criteria:    https://aspenjournals.onlinelibrary.castañeda.com/doi/full/10.1177/678712977707814  5  Options provided:  -- Mild Malnutrition  -- Other - I will add my own diagnosis  -- Disagree - Not applicable / Not valid  -- Disagree - Clinically unable to determine / Unknown  -- Refer to Clinical Documentation Reviewer    PROVIDER RESPONSE TEXT:    This patient has mild malnutrition.    Query created by: Mery Hairston on 2025 4:57 AM      Electronically signed by:  DARREN MORRISON 2/3/2025 8:28 AM

## 2025-02-03 NOTE — ONCOLOGY
Administration: Chemotherapy drug arsenic independently verified with Lindsay Phan RN prior to administration.  Acknowledgement of informed consent for chemotherapy administration verified.  Original order, appropriateness of regimen, drug supplied, height, weight, BSA, dose calculations, expiration dates/times, drug appearance, and two patient identifiers were verified by both RNs.  Drug checked for vesicant/irritant status and for risk of hypersensitivity.  Most recent laboratory values and allergies, were reviewed.  Positive, brisk blood return via CVC was confirmed prior to administration. Chest x-ray for correct line placement reviewed. Wendy Sims RN and Lindsay Phan RN verified correct rate of chemotherapy and maintenance IV fluids.  Patient was educated on chemotherapy regimen prior to administration including indication for treatment related to disease & side effects of chemotherapy drug.  Patient verbalizes understanding of all instructions.    Completion of Chemotherapy: Monitoring during infusion done per policy, see Flowsheets.  Blood return verified before, during, and after infusion per policy; no signs of extravasation.  Patient tolerated chemotherapy well and without incident.  Chemotherapy infusion end time on the MAR.

## 2025-02-03 NOTE — PLAN OF CARE
Problem: Safety - Adult  Goal: Free from fall injury  2/3/2025 1004 by Wendy Sims RN  Outcome: Progressing  Flowsheets (Taken 2/3/2025 0058 by Jenae Youngblood RN)  Free From Fall Injury:   Instruct family/caregiver on patient safety   Based on caregiver fall risk screen, instruct family/caregiver to ask for assistance with transferring infant if caregiver noted to have fall risk factors  Note: + High Fall Risk per DALE/ABCDS: Explained fall risk precautions to patient and family and rationale behind their use to keep the patient safe. Patient's bed is in low position, side rails up x2, call light and belongings are in reach. Chair wheels locked. Chair Alarm on. Sitter at bedside due to confusion. Encouraged patient to call out for assistance as needed.    2/3/2025 0058 by Jenae Youngblood RN  Outcome: Progressing  Flowsheets (Taken 2/3/2025 0058)  Free From Fall Injury:   Instruct family/caregiver on patient safety   Based on caregiver fall risk screen, instruct family/caregiver to ask for assistance with transferring infant if caregiver noted to have fall risk factors     Problem: ABCDS Injury Assessment  Goal: Absence of physical injury  2/3/2025 1004 by Wendy Sims RN  Outcome: Progressing  Flowsheets (Taken 2/3/2025 0058 by Jenae Youngblood, RN)  Absence of Physical Injury: Implement safety measures based on patient assessment  2/3/2025 0058 by Jenae Youngblood RN  Outcome: Progressing  Flowsheets (Taken 2/3/2025 0058)  Absence of Physical Injury: Implement safety measures based on patient assessment     Problem: Neurosensory - Adult  Goal: Achieves stable or improved neurological status  Outcome: Progressing  Flowsheets (Taken 2/1/2025 0058 by Jenae Youngblood, RN)  Achieves stable or improved neurological status: Assess for and report changes in neurological status     Problem: Respiratory - Adult  Goal: Achieves optimal ventilation and oxygenation  2/3/2025 1004 by Wendy Sims RN  Outcome:  patient had a bowel movement this AM.     Problem: Genitourinary - Adult  Goal: Absence of urinary retention  Outcome: Progressing  Flowsheets (Taken 1/30/2025 1316 by Irina Barrios RN)  Absence of urinary retention:   Assess patient’s ability to void and empty bladder   Monitor intake/output and perform bladder scan as needed   Place urinary catheter per Licensed Independent Practitioner order if needed   Discuss with Licensed Independent Practitioner  medications to alleviate retention as needed   Discuss catheterization for long term situations as appropriate     Problem: Infection - Adult  Goal: Absence of infection at discharge  Outcome: Progressing  Flowsheets (Taken 2/2/2025 0147 by Jenae Youngblood, RN)  Absence of infection at discharge:   Assess and monitor for signs and symptoms of infection   Monitor lab/diagnostic results   Monitor all insertion sites i.e., indwelling lines, tubes and drains  Goal: Absence of infection during hospitalization  2/3/2025 1004 by Wendy Sims, RN  Outcome: Progressing  Flowsheets (Taken 2/3/2025 0058 by Jenae Youngblood RN)  Absence of infection during hospitalization:   Assess and monitor for signs and symptoms of infection   Monitor lab/diagnostic results   Monitor all insertion sites i.e., indwelling lines, tubes and drains  2/3/2025 0058 by Jenae Youngblood RN  Outcome: Progressing  Flowsheets (Taken 2/3/2025 0058)  Absence of infection during hospitalization:   Assess and monitor for signs and symptoms of infection   Monitor lab/diagnostic results   Monitor all insertion sites i.e., indwelling lines, tubes and drains  Goal: Absence of fever/infection during anticipated neutropenic period  Outcome: Progressing  Flowsheets (Taken 2/3/2025 1004)  Absence of fever/infection during anticipated neutropenic period:   Monitor white blood cell count   Implement neutropenic guidelines     Problem: Metabolic/Fluid and Electrolytes - Adult  Goal: Electrolytes maintained within

## 2025-02-04 PROBLEM — E44.0 MODERATE MALNUTRITION: Status: ACTIVE | Noted: 2025-02-04

## 2025-02-04 LAB
ALBUMIN SERPL-MCNC: 3.5 G/DL (ref 3.4–5)
ALP SERPL-CCNC: 78 U/L (ref 40–129)
ALT SERPL-CCNC: 14 U/L (ref 10–40)
ANION GAP SERPL CALCULATED.3IONS-SCNC: 12 MMOL/L (ref 3–16)
ANISOCYTOSIS BLD QL SMEAR: ABNORMAL
APTT BLD: 36.1 SEC (ref 22.1–36.4)
APTT BLD: 38.4 SEC (ref 22.1–36.4)
AST SERPL-CCNC: 27 U/L (ref 15–37)
BACTERIA UR CULT: ABNORMAL
BASOPHILS # BLD: 0 K/UL (ref 0–0.2)
BASOPHILS NFR BLD: 0 %
BILIRUB DIRECT SERPL-MCNC: 0.2 MG/DL (ref 0–0.3)
BILIRUB INDIRECT SERPL-MCNC: 0.2 MG/DL (ref 0–1)
BILIRUB SERPL-MCNC: 0.4 MG/DL (ref 0–1)
BUN SERPL-MCNC: 8 MG/DL (ref 7–20)
CALCIUM SERPL-MCNC: 8.3 MG/DL (ref 8.3–10.6)
CHLORIDE SERPL-SCNC: 108 MMOL/L (ref 99–110)
CO2 SERPL-SCNC: 21 MMOL/L (ref 21–32)
CREAT SERPL-MCNC: 0.6 MG/DL (ref 0.8–1.3)
D-DIMER QUANTITATIVE: 3.15 UG/ML FEU (ref 0–0.6)
D-DIMER QUANTITATIVE: 3.43 UG/ML FEU (ref 0–0.6)
DEPRECATED RDW RBC AUTO: 18.5 % (ref 12.4–15.4)
EOSINOPHIL # BLD: 0 K/UL (ref 0–0.6)
EOSINOPHIL NFR BLD: 1 %
FIBRINOGEN PPP-MCNC: 218 MG/DL (ref 227–534)
FIBRINOGEN PPP-MCNC: 274 MG/DL (ref 227–534)
GFR SERPLBLD CREATININE-BSD FMLA CKD-EPI: >90 ML/MIN/{1.73_M2}
GLUCOSE SERPL-MCNC: 112 MG/DL (ref 70–99)
HCT VFR BLD AUTO: 23.2 % (ref 40.5–52.5)
HGB BLD-MCNC: 7.9 G/DL (ref 13.5–17.5)
INR PPP: 1.15 (ref 0.85–1.15)
INR PPP: 1.26 (ref 0.85–1.15)
LDH SERPL L TO P-CCNC: 220 U/L (ref 100–190)
LYMPHOCYTES # BLD: 0.9 K/UL (ref 1–5.1)
LYMPHOCYTES NFR BLD: 26 %
MAGNESIUM SERPL-MCNC: 2.12 MG/DL (ref 1.8–2.4)
MCH RBC QN AUTO: 34.4 PG (ref 26–34)
MCHC RBC AUTO-ENTMCNC: 34.1 G/DL (ref 31–36)
MCV RBC AUTO: 101 FL (ref 80–100)
METAMYELOCYTES NFR BLD MANUAL: 4 %
MONOCYTES # BLD: 0.1 K/UL (ref 0–1.3)
MONOCYTES NFR BLD: 2 %
MYELOCYTES NFR BLD MANUAL: 1 %
NEUTROPHILS # BLD: 2.6 K/UL (ref 1.7–7.7)
NEUTROPHILS NFR BLD: 51 %
NEUTS BAND NFR BLD MANUAL: 15 % (ref 0–7)
ORGANISM: ABNORMAL
PHOSPHATE SERPL-MCNC: 4 MG/DL (ref 2.5–4.9)
PLATELET # BLD AUTO: 62 K/UL (ref 135–450)
PMV BLD AUTO: 9.5 FL (ref 5–10.5)
POTASSIUM SERPL-SCNC: 4.3 MMOL/L (ref 3.5–5.1)
PROT SERPL-MCNC: 5.4 G/DL (ref 6.4–8.2)
PROTHROMBIN TIME: 14.9 SEC (ref 11.9–14.9)
PROTHROMBIN TIME: 16 SEC (ref 11.9–14.9)
RBC # BLD AUTO: 2.29 M/UL (ref 4.2–5.9)
SODIUM SERPL-SCNC: 141 MMOL/L (ref 136–145)
URATE SERPL-MCNC: 3.9 MG/DL (ref 3.5–7.2)
WBC # BLD AUTO: 3.6 K/UL (ref 4–11)

## 2025-02-04 PROCEDURE — 6370000000 HC RX 637 (ALT 250 FOR IP): Performed by: STUDENT IN AN ORGANIZED HEALTH CARE EDUCATION/TRAINING PROGRAM

## 2025-02-04 PROCEDURE — 83735 ASSAY OF MAGNESIUM: CPT

## 2025-02-04 PROCEDURE — 84550 ASSAY OF BLOOD/URIC ACID: CPT

## 2025-02-04 PROCEDURE — 80076 HEPATIC FUNCTION PANEL: CPT

## 2025-02-04 PROCEDURE — 85379 FIBRIN DEGRADATION QUANT: CPT

## 2025-02-04 PROCEDURE — 85384 FIBRINOGEN ACTIVITY: CPT

## 2025-02-04 PROCEDURE — 85610 PROTHROMBIN TIME: CPT

## 2025-02-04 PROCEDURE — 6370000000 HC RX 637 (ALT 250 FOR IP): Performed by: NURSE PRACTITIONER

## 2025-02-04 PROCEDURE — 83615 LACTATE (LD) (LDH) ENZYME: CPT

## 2025-02-04 PROCEDURE — 80048 BASIC METABOLIC PNL TOTAL CA: CPT

## 2025-02-04 PROCEDURE — 2580000003 HC RX 258

## 2025-02-04 PROCEDURE — 36592 COLLECT BLOOD FROM PICC: CPT

## 2025-02-04 PROCEDURE — 2580000003 HC RX 258: Performed by: INTERNAL MEDICINE

## 2025-02-04 PROCEDURE — 2060000000 HC ICU INTERMEDIATE R&B

## 2025-02-04 PROCEDURE — 85730 THROMBOPLASTIN TIME PARTIAL: CPT

## 2025-02-04 PROCEDURE — 6360000002 HC RX W HCPCS: Performed by: INTERNAL MEDICINE

## 2025-02-04 PROCEDURE — 6370000000 HC RX 637 (ALT 250 FOR IP)

## 2025-02-04 PROCEDURE — 85025 COMPLETE CBC W/AUTO DIFF WBC: CPT

## 2025-02-04 PROCEDURE — 6360000002 HC RX W HCPCS

## 2025-02-04 PROCEDURE — 84100 ASSAY OF PHOSPHORUS: CPT

## 2025-02-04 PROCEDURE — 2500000003 HC RX 250 WO HCPCS

## 2025-02-04 RX ORDER — MIRABEGRON 25 MG/1
50 TABLET, FILM COATED, EXTENDED RELEASE ORAL DAILY
Status: DISCONTINUED | OUTPATIENT
Start: 2025-02-05 | End: 2025-02-05

## 2025-02-04 RX ORDER — FESOTERODINE FUMARATE 8 MG/1
8 TABLET, FILM COATED, EXTENDED RELEASE ORAL DAILY
Status: DISCONTINUED | OUTPATIENT
Start: 2025-02-04 | End: 2025-02-13 | Stop reason: HOSPADM

## 2025-02-04 RX ADMIN — HYDROCORTISONE: 1 CREAM TOPICAL at 20:37

## 2025-02-04 RX ADMIN — HYDROCORTISONE: 1 CREAM TOPICAL at 08:50

## 2025-02-04 RX ADMIN — VALACYCLOVIR HYDROCHLORIDE 500 MG: 500 TABLET, FILM COATED ORAL at 08:48

## 2025-02-04 RX ADMIN — LEVOFLOXACIN 500 MG: 500 TABLET, FILM COATED ORAL at 08:48

## 2025-02-04 RX ADMIN — CETIRIZINE HYDROCHLORIDE 10 MG: 10 TABLET, FILM COATED ORAL at 20:37

## 2025-02-04 RX ADMIN — BENZONATATE 100 MG: 100 CAPSULE ORAL at 20:55

## 2025-02-04 RX ADMIN — FLUCONAZOLE 200 MG: 200 TABLET ORAL at 08:48

## 2025-02-04 RX ADMIN — BENZONATATE 100 MG: 100 CAPSULE ORAL at 12:12

## 2025-02-04 RX ADMIN — QUETIAPINE FUMARATE 100 MG: 25 TABLET ORAL at 20:37

## 2025-02-04 RX ADMIN — DONEPEZIL HYDROCHLORIDE 10 MG: 10 TABLET ORAL at 08:48

## 2025-02-04 RX ADMIN — LEVOTHYROXINE SODIUM 175 MCG: 150 TABLET ORAL at 06:44

## 2025-02-04 RX ADMIN — ARSENIC TRIOXIDE 13 MG: 1 INJECTION, SOLUTION INTRAVENOUS at 11:54

## 2025-02-04 RX ADMIN — TRETINOIN 50 MG: 10 CAPSULE ORAL at 17:02

## 2025-02-04 RX ADMIN — VALACYCLOVIR HYDROCHLORIDE 500 MG: 500 TABLET, FILM COATED ORAL at 20:37

## 2025-02-04 RX ADMIN — Medication 6 MG: at 20:37

## 2025-02-04 RX ADMIN — POTASSIUM CHLORIDE: 2 INJECTION, SOLUTION, CONCENTRATE INTRAVENOUS at 08:57

## 2025-02-04 RX ADMIN — SODIUM CHLORIDE, PRESERVATIVE FREE 10 ML: 5 INJECTION INTRAVENOUS at 08:48

## 2025-02-04 RX ADMIN — TRETINOIN 50 MG: 10 CAPSULE ORAL at 08:48

## 2025-02-04 RX ADMIN — TROSPIUM CHLORIDE 20 MG: 20 TABLET, FILM COATED ORAL at 06:44

## 2025-02-04 NOTE — ONCOLOGY
Administration: Chemotherapy drug Aresenic independently verified with Savita Arechiga RN prior to administration.  Acknowledgement of informed consent for chemotherapy administration verified.  Original order, appropriateness of regimen, drug supplied, height, weight, BSA, dose calculations, expiration dates/times, drug appearance, and two patient identifiers were verified by both RNs.  Drug checked for vesicant/irritant status and for risk of hypersensitivity.  Most recent laboratory values and allergies, were reviewed.  Positive, brisk blood return via CVC was confirmed prior to administration. Chest x-ray for correct line placement reviewed. Olinda Farrar RN and Savita Arechiga RN verified correct rate of chemotherapy and maintenance IV fluids.  Patient was educated on chemotherapy regimen prior to administration including indication for treatment related to disease & side effects of chemotherapy drug.  Patient verbalizes understanding of all instructions.    Completion of Chemotherapy: Monitoring during infusion done per policy, see Flowsheets.  Blood return verified before, during, and after infusion per policy; no signs of extravasation.  Pt tolerating chemotherapy well and without incident.  Chemotherapy infusion end time on the MAR.  Will continue to monitor.

## 2025-02-04 NOTE — PLAN OF CARE
Problem: Safety - Adult  Goal: Free from fall injury  2/4/2025 1203 by Olinda Farrar, RN  Outcome: Progressing  Flowsheets (Taken 2/4/2025 1203)  Free From Fall Injury: Instruct family/caregiver on patient safety  Note: Pt does not meet criteria for orthostasis.  Pt is a High fall risk. See Dale Fall Score and ABCDS Injury Risk assessments.   + Screening for Orthostasis and/or + High Fall Risk per DALE/ABCDS: Explained fall risk precautions to pt and family and rationale behind their use to keep the patient safe. Pt bed is in low position, side rails up, call light and belongings are in reach. Fall wristband applied and present on pts wrist.  Chair Alarm on.  Pt encouraged to call for assistance. Will continue with hourly rounds for PO intake, pain needs, toileting and repositioning as needed.      Problem: Musculoskeletal - Adult  Goal: Return mobility to safest level of function  2/4/2025 1203 by Olinda Farrar, RN  Outcome: Progressing  Flowsheets (Taken 2/4/2025 1203)  Return Mobility to Safest Level of Function:   Assess patient stability and activity tolerance for standing, transferring and ambulating with or without assistive devices   Assist with transfers and ambulation using safe patient handling equipment as needed   Obtain physical therapy/occupational therapy consults as needed     Problem: Gastrointestinal - Adult  Goal: Minimal or absence of nausea and vomiting  2/4/2025 1203 by Olinda Farrar, RN  Outcome: Progressing  Flowsheets (Taken 2/4/2025 1203)  Minimal or absence of nausea and vomiting:   Administer IV fluids as ordered to ensure adequate hydration   Administer ordered antiemetic medications as needed   Nutrition consult to assist patient with adequate nutrition and appropriate food choices     Problem: Metabolic/Fluid and Electrolytes - Adult  Goal: Electrolytes maintained within normal limits  2/4/2025 1203 by Olinda Farrar, RN  Outcome: Progressing  Flowsheets (Taken 2/4/2025

## 2025-02-04 NOTE — PROGRESS NOTES
Comprehensive Nutrition Assessment    RECOMMENDATIONS:  PO Diet: Regular; low microbial  Nutrition Supplement: Family supplying Muscle Milk from home  Nutrition Education: Education/Counseling initiated (low microbial diet)     NUTRITION ASSESSMENT:   Nutritional summary & status: Follow up. PO intake consistently poor. Consumed 25% of 1 snack yesterday. Poor appetite/apathy towards food + dislikes food at [facility]. Encouraged family to bring in food from home, pt's wife agreeable. Bringing Muscle Milk from home, drinking 1/day. Created eating schedule, provided recommendations of snacks to trial. Encouraged pt to eat protein first at mealtime. Pt opting for fruit and cereal. Significant wt loss of 4%/9# since admit. Now meets criteria for moderate malnutrition. Pt expected to remain inpatient for ~1 month. Nutrition support via TF is indicated, however, likely inappropriate in current situation. MD aware. Could add appetite stimulant if current interventions do not increase PO intake.     Admission // PMH: Acute promyelocyticm leukemia // hypothyroidism, Thyroid cancer s/p thyroidectomy, Lewy body dementia, Sensorineural hearing loss bilaterally    MALNUTRITION ASSESSMENT  Context of Malnutrition: Acute Illness   Malnutrition Status: Moderate malnutrition  Findings of the 6 clinical characteristics of malnutrition (Minimum of 2 out of 6 clinical characteristics is required to make the diagnosis of moderate or severe Protein Calorie Malnutrition based on AND/ASPEN Guidelines):  Energy Intake:  50% or less of estimated energy requirements for 5 or more days  Weight Loss:  1% to 2% over 1 week (4% in 14 days/ 2% in 1 week)     Body Fat Loss:  No body fat loss     Muscle Mass Loss:  No muscle mass loss      NUTRITION DIAGNOSIS   Moderate malnutrition, in context of acute illness or injury related to decreased appetite, catabolic illness as evidenced by criteria as evidenced in malnutrition assessment    Nutrition

## 2025-02-04 NOTE — PROGRESS NOTES
Physical Therapy  Refusal    PT attempted to see patient for therapy, pt politely declining stating he is too tired at this time. Pt reports he has been up already today, sitter in room confirms patient has been ambulating in hallway. PT will follow up per POC.    Toya Hughes PT, DPT, NCS, CSRS

## 2025-02-05 LAB
ALBUMIN SERPL-MCNC: 3.5 G/DL (ref 3.4–5)
ALP SERPL-CCNC: 81 U/L (ref 40–129)
ALT SERPL-CCNC: 13 U/L (ref 10–40)
ANION GAP SERPL CALCULATED.3IONS-SCNC: 11 MMOL/L (ref 3–16)
ANISOCYTOSIS BLD QL SMEAR: ABNORMAL
APTT BLD: 36.1 SEC (ref 22.1–36.4)
AST SERPL-CCNC: 30 U/L (ref 15–37)
BASOPHILS # BLD: 0 K/UL (ref 0–0.2)
BASOPHILS NFR BLD: 0 %
BILIRUB DIRECT SERPL-MCNC: 0.2 MG/DL (ref 0–0.3)
BILIRUB INDIRECT SERPL-MCNC: 0.2 MG/DL (ref 0–1)
BILIRUB SERPL-MCNC: 0.4 MG/DL (ref 0–1)
BUN SERPL-MCNC: 8 MG/DL (ref 7–20)
C DIFF TOX A+B STL QL IA: NORMAL
CALCIUM SERPL-MCNC: 8.2 MG/DL (ref 8.3–10.6)
CHLORIDE SERPL-SCNC: 108 MMOL/L (ref 99–110)
CO2 SERPL-SCNC: 20 MMOL/L (ref 21–32)
CREAT SERPL-MCNC: 0.7 MG/DL (ref 0.8–1.3)
D-DIMER QUANTITATIVE: 2.95 UG/ML FEU (ref 0–0.6)
DACRYOCYTES BLD QL SMEAR: ABNORMAL
DEPRECATED RDW RBC AUTO: 18.7 % (ref 12.4–15.4)
EOSINOPHIL # BLD: 0 K/UL (ref 0–0.6)
EOSINOPHIL NFR BLD: 1 %
FERRITIN SERPL IA-MCNC: 697 NG/ML (ref 30–400)
FIBRINOGEN PPP-MCNC: 239 MG/DL (ref 227–534)
GFR SERPLBLD CREATININE-BSD FMLA CKD-EPI: >90 ML/MIN/{1.73_M2}
GLUCOSE SERPL-MCNC: 117 MG/DL (ref 70–99)
HCT VFR BLD AUTO: 22.8 % (ref 40.5–52.5)
HCT VFR BLD AUTO: 23.9 % (ref 40.5–52.5)
HGB BLD-MCNC: 7.8 G/DL (ref 13.5–17.5)
IMMATURE RETIC FRACT: 0.67 (ref 0.21–0.37)
INR PPP: 1.25 (ref 0.85–1.15)
IRON SATN MFR SERPL: 35 % (ref 20–50)
IRON SERPL-MCNC: 61 UG/DL (ref 59–158)
LDH SERPL L TO P-CCNC: 223 U/L (ref 100–190)
LYMPHOCYTES # BLD: 0.9 K/UL (ref 1–5.1)
LYMPHOCYTES NFR BLD: 22 %
MACROCYTES BLD QL SMEAR: ABNORMAL
MAGNESIUM SERPL-MCNC: 2.13 MG/DL (ref 1.8–2.4)
MCH RBC QN AUTO: 34.9 PG (ref 26–34)
MCHC RBC AUTO-ENTMCNC: 34.2 G/DL (ref 31–36)
MCV RBC AUTO: 102 FL (ref 80–100)
METAMYELOCYTES NFR BLD MANUAL: 6 %
MICROCYTES BLD QL SMEAR: ABNORMAL
MONOCYTES # BLD: 0.1 K/UL (ref 0–1.3)
MONOCYTES NFR BLD: 3 %
MYELOCYTES NFR BLD MANUAL: 1 %
NEUTROPHILS # BLD: 2.9 K/UL (ref 1.7–7.7)
NEUTROPHILS NFR BLD: 49 %
NEUTS BAND NFR BLD MANUAL: 18 % (ref 0–7)
NRBC BLD-RTO: 3 /100 WBC
PHOSPHATE SERPL-MCNC: 4.3 MG/DL (ref 2.5–4.9)
PLATELET # BLD AUTO: 70 K/UL (ref 135–450)
PLATELET BLD QL SMEAR: ABNORMAL
PMV BLD AUTO: 9.8 FL (ref 5–10.5)
POLYCHROMASIA BLD QL SMEAR: ABNORMAL
POTASSIUM SERPL-SCNC: 4.3 MMOL/L (ref 3.5–5.1)
PROT SERPL-MCNC: 5.5 G/DL (ref 6.4–8.2)
PROTHROMBIN TIME: 15.9 SEC (ref 11.9–14.9)
RBC # BLD AUTO: 2.23 M/UL (ref 4.2–5.9)
RETICS # AUTO: 0.06 M/UL
RETICS/RBC NFR AUTO: 2.45 % (ref 0.5–2.18)
SLIDE REVIEW: ABNORMAL
SODIUM SERPL-SCNC: 139 MMOL/L (ref 136–145)
TIBC SERPL-MCNC: 172 UG/DL (ref 260–445)
URATE SERPL-MCNC: 3.9 MG/DL (ref 3.5–7.2)
WBC # BLD AUTO: 3.9 K/UL (ref 4–11)

## 2025-02-05 PROCEDURE — 84100 ASSAY OF PHOSPHORUS: CPT

## 2025-02-05 PROCEDURE — 2060000000 HC ICU INTERMEDIATE R&B

## 2025-02-05 PROCEDURE — 2580000003 HC RX 258: Performed by: INTERNAL MEDICINE

## 2025-02-05 PROCEDURE — 85025 COMPLETE CBC W/AUTO DIFF WBC: CPT

## 2025-02-05 PROCEDURE — 84550 ASSAY OF BLOOD/URIC ACID: CPT

## 2025-02-05 PROCEDURE — 97116 GAIT TRAINING THERAPY: CPT

## 2025-02-05 PROCEDURE — 82746 ASSAY OF FOLIC ACID SERUM: CPT

## 2025-02-05 PROCEDURE — 83550 IRON BINDING TEST: CPT

## 2025-02-05 PROCEDURE — 85384 FIBRINOGEN ACTIVITY: CPT

## 2025-02-05 PROCEDURE — 97530 THERAPEUTIC ACTIVITIES: CPT

## 2025-02-05 PROCEDURE — 85379 FIBRIN DEGRADATION QUANT: CPT

## 2025-02-05 PROCEDURE — 36592 COLLECT BLOOD FROM PICC: CPT

## 2025-02-05 PROCEDURE — 6370000000 HC RX 637 (ALT 250 FOR IP)

## 2025-02-05 PROCEDURE — 87449 NOS EACH ORGANISM AG IA: CPT

## 2025-02-05 PROCEDURE — 80076 HEPATIC FUNCTION PANEL: CPT

## 2025-02-05 PROCEDURE — 83615 LACTATE (LD) (LDH) ENZYME: CPT

## 2025-02-05 PROCEDURE — 6370000000 HC RX 637 (ALT 250 FOR IP): Performed by: NURSE PRACTITIONER

## 2025-02-05 PROCEDURE — 2580000003 HC RX 258

## 2025-02-05 PROCEDURE — 85045 AUTOMATED RETICULOCYTE COUNT: CPT

## 2025-02-05 PROCEDURE — 83735 ASSAY OF MAGNESIUM: CPT

## 2025-02-05 PROCEDURE — 82728 ASSAY OF FERRITIN: CPT

## 2025-02-05 PROCEDURE — 6360000002 HC RX W HCPCS

## 2025-02-05 PROCEDURE — 82607 VITAMIN B-12: CPT

## 2025-02-05 PROCEDURE — 97535 SELF CARE MNGMENT TRAINING: CPT

## 2025-02-05 PROCEDURE — 6370000000 HC RX 637 (ALT 250 FOR IP): Performed by: STUDENT IN AN ORGANIZED HEALTH CARE EDUCATION/TRAINING PROGRAM

## 2025-02-05 PROCEDURE — 85610 PROTHROMBIN TIME: CPT

## 2025-02-05 PROCEDURE — 87324 CLOSTRIDIUM AG IA: CPT

## 2025-02-05 PROCEDURE — 83540 ASSAY OF IRON: CPT

## 2025-02-05 PROCEDURE — 85730 THROMBOPLASTIN TIME PARTIAL: CPT

## 2025-02-05 PROCEDURE — 6360000002 HC RX W HCPCS: Performed by: INTERNAL MEDICINE

## 2025-02-05 PROCEDURE — 2500000003 HC RX 250 WO HCPCS

## 2025-02-05 PROCEDURE — 80048 BASIC METABOLIC PNL TOTAL CA: CPT

## 2025-02-05 RX ORDER — LOPERAMIDE HYDROCHLORIDE 2 MG/1
2 CAPSULE ORAL PRN
Status: DISCONTINUED | OUTPATIENT
Start: 2025-02-05 | End: 2025-02-13 | Stop reason: HOSPADM

## 2025-02-05 RX ORDER — MIRABEGRON 50 MG/1
50 TABLET, FILM COATED, EXTENDED RELEASE ORAL DAILY
Status: DISCONTINUED | OUTPATIENT
Start: 2025-02-05 | End: 2025-02-13 | Stop reason: HOSPADM

## 2025-02-05 RX ORDER — LOPERAMIDE HYDROCHLORIDE 2 MG/1
4 CAPSULE ORAL ONCE
Status: COMPLETED | OUTPATIENT
Start: 2025-02-05 | End: 2025-02-05

## 2025-02-05 RX ADMIN — BENZONATATE 100 MG: 100 CAPSULE ORAL at 20:03

## 2025-02-05 RX ADMIN — FESOTERODINE FUMARATE 8 MG: 8 TABLET, FILM COATED, EXTENDED RELEASE ORAL at 09:13

## 2025-02-05 RX ADMIN — Medication 6 MG: at 20:02

## 2025-02-05 RX ADMIN — TRETINOIN 50 MG: 10 CAPSULE ORAL at 17:09

## 2025-02-05 RX ADMIN — QUETIAPINE FUMARATE 100 MG: 25 TABLET ORAL at 20:03

## 2025-02-05 RX ADMIN — TRETINOIN 50 MG: 10 CAPSULE ORAL at 08:50

## 2025-02-05 RX ADMIN — LOPERAMIDE HYDROCHLORIDE 4 MG: 2 CAPSULE ORAL at 12:23

## 2025-02-05 RX ADMIN — FLUCONAZOLE 200 MG: 200 TABLET ORAL at 08:44

## 2025-02-05 RX ADMIN — BENZONATATE 100 MG: 100 CAPSULE ORAL at 07:15

## 2025-02-05 RX ADMIN — HYDROCORTISONE: 1 CREAM TOPICAL at 20:12

## 2025-02-05 RX ADMIN — LEVOFLOXACIN 500 MG: 500 TABLET, FILM COATED ORAL at 08:44

## 2025-02-05 RX ADMIN — SODIUM CHLORIDE, PRESERVATIVE FREE 10 ML: 5 INJECTION INTRAVENOUS at 20:12

## 2025-02-05 RX ADMIN — DONEPEZIL HYDROCHLORIDE 10 MG: 10 TABLET ORAL at 08:44

## 2025-02-05 RX ADMIN — POTASSIUM CHLORIDE: 2 INJECTION, SOLUTION, CONCENTRATE INTRAVENOUS at 06:05

## 2025-02-05 RX ADMIN — CETIRIZINE HYDROCHLORIDE 10 MG: 10 TABLET, FILM COATED ORAL at 20:03

## 2025-02-05 RX ADMIN — VALACYCLOVIR HYDROCHLORIDE 500 MG: 500 TABLET, FILM COATED ORAL at 08:44

## 2025-02-05 RX ADMIN — VALACYCLOVIR HYDROCHLORIDE 500 MG: 500 TABLET, FILM COATED ORAL at 20:03

## 2025-02-05 RX ADMIN — SODIUM CHLORIDE, PRESERVATIVE FREE 10 ML: 5 INJECTION INTRAVENOUS at 08:45

## 2025-02-05 RX ADMIN — ARSENIC TRIOXIDE 13 MG: 1 INJECTION, SOLUTION INTRAVENOUS at 12:19

## 2025-02-05 RX ADMIN — LEVOTHYROXINE SODIUM 175 MCG: 150 TABLET ORAL at 07:15

## 2025-02-05 RX ADMIN — HYDROCORTISONE: 1 CREAM TOPICAL at 08:45

## 2025-02-05 RX ADMIN — MIRABEGRON 50 MG: 50 TABLET, FILM COATED, EXTENDED RELEASE ORAL at 09:13

## 2025-02-05 NOTE — PROGRESS NOTES
Physical Therapy  Facility/Department: [address]  Physical Therapy Daily Treatment    Name: Karan Fortune  : 1950  MRN: [id number]  Date of Service: 2025    Discharge Recommendations:  24 hour supervision or assist, Home with Home health PT   PT Equipment Recommendations  Equipment Needed: No (cont to assess)      Patient Diagnosis(es): The primary encounter diagnosis was Acute leukemia of unspecified cell type not having achieved remission (HCC). A diagnosis of Leukemia not having achieved remission, unspecified leukemia type (HCC) was also pertinent to this visit.  Past Medical History:  has a past medical history of Thyroid cancer (HCC).  Past Surgical History:  has a past surgical history that includes Total Thyroidectomy (); Hemorrhoid surgery (); Shoulder arthroscopy (); Colonoscopy (); Throat surgery; hernia repair; and Shoulder arthroscopy (12).    Assessment  Body Structures, Functions, Activity Limitations Requiring Skilled Therapeutic Intervention: Decreased functional mobility ;Decreased balance  Assessment: Pt continues to require SBA for transfers and amb without AD. Cues for safety. Plan for PT to DC home with family, rec initial 24hr and HHPT.  Treatment Diagnosis: Decreased functional mobility  Activity Tolerance  Activity Tolerance: Patient tolerated treatment well    Plan  Physical Therapy Plan  General Plan:  (2-5)  Current Treatment Recommendations: Balance training, Functional mobility training, Endurance training, Gait training, Safety education & training, Patient/Caregiver education & training  Safety Devices  Type of Devices: Left in chair, Call light within reach, Nurse notified, Sitter present, Telesitter in use, Chair alarm in place (wife present)    Restrictions  Restrictions/Precautions  Activity Level: Up as Tolerated     Subjective  Subjective  Subjective: pt seen sitting up in chair on arrival, no c/o pain, agreeable to therapy.

## 2025-02-05 NOTE — ONCOLOGY
Administration: Chemotherapy drug Arsenic independently verified with Hollie Heaton RN prior to administration.  Acknowledgement of informed consent for chemotherapy administration verified.  Original order, appropriateness of regimen, drug supplied, height, weight, BSA, dose calculations, expiration dates/times, drug appearance, and two patient identifiers were verified by both RNs.  Drug checked for vesicant/irritant status and for risk of hypersensitivity.  Most recent laboratory values and allergies, were reviewed.  Positive, brisk blood return via CVC was confirmed prior to administration. Chest x-ray for correct line placement reviewed. Olinda Farrar RN and Hollie Heaton RN verified correct rate of chemotherapy and maintenance IV fluids.  Patient was educated on chemotherapy regimen prior to administration including indication for treatment related to disease & side effects of chemotherapy drug.  Patient verbalizes understanding of all instructions.    Completion of Chemotherapy: Monitoring during infusion done per policy, see Flowsheets.  Blood return verified before, during, and after infusion per policy; no signs of extravasation.  Pt tolerating chemotherapy well and without incident.  Chemotherapy infusion end time on the MAR.  Will continue to monitor.

## 2025-02-05 NOTE — PLAN OF CARE
Problem: Safety - Adult  Goal: Free from fall injury  2/5/2025 0013 by Jenae Youngblood RN  Outcome: Progressing  Flowsheets  Taken 2/5/2025 0013 by Jenae Youngblood RN  Free From Fall Injury:   Instruct family/caregiver on patient safety    Problem: ABCDS Injury Assessment  Goal: Absence of physical injury  2/5/2025 0013 by Jenae Youngblood RN  Outcome: Progressing  Flowsheets (Taken 2/5/2025 0013)  Absence of Physical Injury: Implement safety measures based on patient assessment     Problem: Skin/Tissue Integrity - Adult  Goal: Skin integrity remains intact  2/5/2025 0013 by Jenae Youngblood RN  Outcome: Progressing  Flowsheets (Taken 2/5/2025 0013)  Skin Integrity Remains Intact: Monitor for areas of redness and/or skin breakdown     Problem: Musculoskeletal - Adult  Goal: Return mobility to safest level of function  2/5/2025 0013 by Jenae Youngblood RN  Outcome: Progressing  Flowsheets  Taken 2/5/2025 0013 by Jenae Youngblood RN  Return Mobility to Safest Level of Function:   Assess patient stability and activity tolerance for standing, transferring and ambulating with or without assistive devices   Assist with transfers and ambulation using safe patient handling equipment as needed   Obtain physical therapy/occupational therapy consults as needed    Problem: Infection - Adult  Goal: Absence of infection during hospitalization  2/5/2025 0013 by Jenae Youngblood RN  Outcome: Progressing  Flowsheets (Taken 2/5/2025 0013)  Absence of infection during hospitalization:   Assess and monitor for signs and symptoms of infection   Monitor lab/diagnostic results   Monitor all insertion sites i.e., indwelling lines, tubes and drains   Instruct and encourage patient and family to use good hand hygiene technique

## 2025-02-05 NOTE — PROGRESS NOTES
Occupational Therapy  Facility/Department: [address]  Occupational Therapy Treatment     Name: Karan Fortune  : 1950  MRN: [id number]  Date of Service: 2025    Discharge Recommendations:  24 hour supervision or assist  OT Equipment Recommendations  Equipment Needed: No       Patient Diagnosis(es): The primary encounter diagnosis was Acute leukemia of unspecified cell type not having achieved remission (HCC). A diagnosis of Leukemia not having achieved remission, unspecified leukemia type (HCC) was also pertinent to this visit.  Past Medical History:  has a past medical history of Thyroid cancer (HCC).  Past Surgical History:  has a past surgical history that includes Total Thyroidectomy (); Hemorrhoid surgery (); Shoulder arthroscopy (); Colonoscopy (); Throat surgery; hernia repair; and Shoulder arthroscopy (12).    Treatment Diagnosis: Impaired ADL and functional mobility      Assessment  Performance deficits / Impairments: Decreased functional mobility ;Decreased ADL status;Decreased endurance;Decreased safe awareness  Assessment: Pt is making good progress in OT and completed 2 laps w/o AD today w/ SBA w/o rest break. Pt performed toileting w/ CGA and grooming w/ SBA. Pt w/ baseline dementia and requires VCs to follow directions. Pt is making good progress and continues to benefit from OT. Cont OT per POC  Treatment Diagnosis: Impaired ADL and functional mobility  REQUIRES OT FOLLOW-UP: Yes  Activity Tolerance  Activity Tolerance: Patient Tolerated treatment well     Plan  Occupational Therapy Plan  Times Per Week: 2-5  Current Treatment Recommendations: Strengthening, ROM, Balance training, Functional mobility training, Endurance training, Cognitive reorientation, Self-Care / ADL    Restrictions  Restrictions/Precautions  Activity Level: Up as Tolerated    Subjective  General  Chart Reviewed: Yes  Additional Pertinent Hx: Pt admitted 25 for further

## 2025-02-05 NOTE — PROGRESS NOTES
Physician Progress Note      PATIENT:               RIKI FANG  CSN #:                  [id number]  :                       1950  ADMIT DATE:       2025 3:40 PM  DISCH DATE:  RESPONDING  PROVIDER #:        Vesna Lundberg DO          QUERY TEXT:    Pt admitted with leukemia and has mild malnutrition documented  by   Registered Dietician, progressed to moderate malnutrition  as per RD .   Please further specify type of malnutrition with documentation in the medical   record.    The medical record reflects the following:  Risk Factors: 75 yo w/ leukemia getting chemo, dementia  Clinical Indicators: Per RD : Mild malnutrition, 75% or less of estimated   energy requirements for 7 or more days. Per RD /: Moderate malnutrition. 50%   or less of estimated energy requirements for 5 or more days. Weight Loss:  1%   to 2% over 1 week (4% in 14 days/ 2% in 1 week).  Treatment: Low microbial diet, muscle milk, RD monitoring      ASPEN Criteria:    https://aspenjournals.onlinelibrary.castañeda.com/doi/full/10.1177/422635688579305  5  Options provided:  -- Mild Malnutrition progressed to Moderate Malnutrition  -- Other - I will add my own diagnosis  -- Disagree - Not applicable / Not valid  -- Disagree - Clinically unable to determine / Unknown  -- Refer to Clinical Documentation Reviewer    PROVIDER RESPONSE TEXT:    This patient has mild malnutrition progressed to Moderate Malnutrition.    Query created by: Mery Hairston on 2025 7:42 AM      Electronically signed by:  Vesna Lundberg DO 2025 8:04 AM

## 2025-02-05 NOTE — PROGRESS NOTES
[facility] Progress Note    2025     Karan Fortune    MRN: [id number]    : 1950    Referring MD: No referring provider defined for this encounter.    Interval History:  -Tolerating chemotherapy well  -Staph epi from urine culture--> likely a contaminate. UA not convincing for infection. No urinary symptoms.   -No new complaints     ECOG PS:  (2) Ambulatory and capable of self care, unable to carry out work activity, up and about > 50% or waking hours    KPS: 60% Requires occasional assistance, but is able to care for most of his personal needs    Isolation: None    Medications    Scheduled Meds:   mirabegron  50 mg Oral Daily    Fesoterodine Fumarate ER  8 mg Oral Daily    QUEtiapine  100 mg Oral Nightly    hydrocortisone   Topical BID    arsenic trioxide (TRISENOX) 13 mg in sodium chloride 0.9 % 250 mL IVPB  13 mg IntraVENous Q24H    cetirizine  10 mg Oral Nightly    donepezil  10 mg Oral Daily    sodium chloride flush  5-40 mL IntraVENous 2 times per day    Saline Mouthwash  15 mL Swish & Spit 4x Daily AC & HS    valACYclovir  500 mg Oral BID    fluconazole  200 mg Oral Daily    levoFLOXacin  500 mg Oral Daily    sodium chloride flush  5-40 mL IntraVENous 2 times per day    tretinoin  22.5 mg/m2 Oral Daily with breakfast    And    tretinoin  22.5 mg/m2 Oral Dinner    levothyroxine  175 mcg Oral Daily     Continuous Infusions:   potassium chloride 40 mEq, magnesium sulfate 1,000 mg in sodium chloride 0.9 % 1,000 mL infusion 50 mL/hr at 25 0605    sodium chloride      sodium chloride      sodium chloride      sodium chloride      sodium chloride 5 mL/hr at 25 0559     PRN Meds:.benzonatate, melatonin, hydrALAZINE, sodium chloride, magnesium sulfate, prochlorperazine **OR** prochlorperazine, potassium chloride, sodium chloride, traMADol, sodium chloride, sodium chloride flush, sodium chloride, ALTEplase (CATHFLO) 2 mg in sterile water 2 mL injection, sodium chloride flush, sodium

## 2025-02-06 LAB
ALBUMIN SERPL-MCNC: 3.6 G/DL (ref 3.4–5)
ALP SERPL-CCNC: 81 U/L (ref 40–129)
ALT SERPL-CCNC: 14 U/L (ref 10–40)
ANION GAP SERPL CALCULATED.3IONS-SCNC: 10 MMOL/L (ref 3–16)
ANISOCYTOSIS BLD QL SMEAR: ABNORMAL
AST SERPL-CCNC: 31 U/L (ref 15–37)
BASOPHILS # BLD: 0 K/UL (ref 0–0.2)
BASOPHILS NFR BLD: 0 %
BILIRUB DIRECT SERPL-MCNC: 0.2 MG/DL (ref 0–0.3)
BILIRUB INDIRECT SERPL-MCNC: 0.3 MG/DL (ref 0–1)
BILIRUB SERPL-MCNC: 0.5 MG/DL (ref 0–1)
BUN SERPL-MCNC: 7 MG/DL (ref 7–20)
CALCIUM SERPL-MCNC: 8.2 MG/DL (ref 8.3–10.6)
CHLORIDE SERPL-SCNC: 109 MMOL/L (ref 99–110)
CO2 SERPL-SCNC: 21 MMOL/L (ref 21–32)
CREAT SERPL-MCNC: 0.6 MG/DL (ref 0.8–1.3)
DEPRECATED RDW RBC AUTO: 18.8 % (ref 12.4–15.4)
EKG ATRIAL RATE: 98 BPM
EKG DIAGNOSIS: NORMAL
EKG P AXIS: 65 DEGREES
EKG P-R INTERVAL: 142 MS
EKG Q-T INTERVAL: 386 MS
EKG QRS DURATION: 76 MS
EKG QTC CALCULATION (BAZETT): 492 MS
EKG R AXIS: 8 DEGREES
EKG T AXIS: 31 DEGREES
EKG VENTRICULAR RATE: 98 BPM
EOSINOPHIL # BLD: 0.1 K/UL (ref 0–0.6)
EOSINOPHIL NFR BLD: 2 %
FOLATE SERPL-MCNC: 8.36 NG/ML (ref 4.78–24.2)
GFR SERPLBLD CREATININE-BSD FMLA CKD-EPI: >90 ML/MIN/{1.73_M2}
GLUCOSE SERPL-MCNC: 105 MG/DL (ref 70–99)
HCT VFR BLD AUTO: 23.6 % (ref 40.5–52.5)
HGB BLD-MCNC: 8.1 G/DL (ref 13.5–17.5)
LYMPHOCYTES # BLD: 0.7 K/UL (ref 1–5.1)
LYMPHOCYTES NFR BLD: 18 %
MAGNESIUM SERPL-MCNC: 2.14 MG/DL (ref 1.8–2.4)
MCH RBC QN AUTO: 34.7 PG (ref 26–34)
MCHC RBC AUTO-ENTMCNC: 34.2 G/DL (ref 31–36)
MCV RBC AUTO: 101.3 FL (ref 80–100)
METAMYELOCYTES NFR BLD MANUAL: 1 %
MONOCYTES # BLD: 0.1 K/UL (ref 0–1.3)
MONOCYTES NFR BLD: 3 %
NEUTROPHILS # BLD: 2.9 K/UL (ref 1.7–7.7)
NEUTROPHILS NFR BLD: 75 %
NEUTS BAND NFR BLD MANUAL: 1 % (ref 0–7)
NRBC BLD-RTO: 1 /100 WBC
PHOSPHATE SERPL-MCNC: 3.8 MG/DL (ref 2.5–4.9)
PLATELET # BLD AUTO: 81 K/UL (ref 135–450)
PMV BLD AUTO: 9.5 FL (ref 5–10.5)
POLYCHROMASIA BLD QL SMEAR: ABNORMAL
POTASSIUM SERPL-SCNC: 3.7 MMOL/L (ref 3.5–5.1)
PROT SERPL-MCNC: 5.6 G/DL (ref 6.4–8.2)
RBC # BLD AUTO: 2.33 M/UL (ref 4.2–5.9)
SODIUM SERPL-SCNC: 140 MMOL/L (ref 136–145)
VIT B12 SERPL-MCNC: >4000 PG/ML (ref 211–911)
WBC # BLD AUTO: 3.8 K/UL (ref 4–11)

## 2025-02-06 PROCEDURE — 80076 HEPATIC FUNCTION PANEL: CPT

## 2025-02-06 PROCEDURE — 6360000002 HC RX W HCPCS: Performed by: INTERNAL MEDICINE

## 2025-02-06 PROCEDURE — 84100 ASSAY OF PHOSPHORUS: CPT

## 2025-02-06 PROCEDURE — 80048 BASIC METABOLIC PNL TOTAL CA: CPT

## 2025-02-06 PROCEDURE — 36592 COLLECT BLOOD FROM PICC: CPT

## 2025-02-06 PROCEDURE — 6370000000 HC RX 637 (ALT 250 FOR IP): Performed by: STUDENT IN AN ORGANIZED HEALTH CARE EDUCATION/TRAINING PROGRAM

## 2025-02-06 PROCEDURE — 6370000000 HC RX 637 (ALT 250 FOR IP): Performed by: NURSE PRACTITIONER

## 2025-02-06 PROCEDURE — 93005 ELECTROCARDIOGRAM TRACING: CPT | Performed by: INTERNAL MEDICINE

## 2025-02-06 PROCEDURE — 93010 ELECTROCARDIOGRAM REPORT: CPT | Performed by: INTERNAL MEDICINE

## 2025-02-06 PROCEDURE — 83735 ASSAY OF MAGNESIUM: CPT

## 2025-02-06 PROCEDURE — 2580000003 HC RX 258

## 2025-02-06 PROCEDURE — 2060000000 HC ICU INTERMEDIATE R&B

## 2025-02-06 PROCEDURE — 6360000002 HC RX W HCPCS

## 2025-02-06 PROCEDURE — 6370000000 HC RX 637 (ALT 250 FOR IP)

## 2025-02-06 PROCEDURE — 2580000003 HC RX 258: Performed by: INTERNAL MEDICINE

## 2025-02-06 PROCEDURE — 85025 COMPLETE CBC W/AUTO DIFF WBC: CPT

## 2025-02-06 PROCEDURE — 2500000003 HC RX 250 WO HCPCS

## 2025-02-06 RX ADMIN — POTASSIUM CHLORIDE 20 MEQ: 400 INJECTION, SOLUTION INTRAVENOUS at 06:05

## 2025-02-06 RX ADMIN — POTASSIUM CHLORIDE 20 MEQ: 400 INJECTION, SOLUTION INTRAVENOUS at 08:33

## 2025-02-06 RX ADMIN — POTASSIUM CHLORIDE: 2 INJECTION, SOLUTION, CONCENTRATE INTRAVENOUS at 23:44

## 2025-02-06 RX ADMIN — SODIUM CHLORIDE, PRESERVATIVE FREE 10 ML: 5 INJECTION INTRAVENOUS at 19:38

## 2025-02-06 RX ADMIN — QUETIAPINE FUMARATE 100 MG: 25 TABLET ORAL at 19:37

## 2025-02-06 RX ADMIN — POTASSIUM CHLORIDE: 2 INJECTION, SOLUTION, CONCENTRATE INTRAVENOUS at 02:06

## 2025-02-06 RX ADMIN — TRETINOIN 50 MG: 10 CAPSULE ORAL at 08:35

## 2025-02-06 RX ADMIN — TRETINOIN 50 MG: 10 CAPSULE ORAL at 15:45

## 2025-02-06 RX ADMIN — DONEPEZIL HYDROCHLORIDE 10 MG: 10 TABLET ORAL at 08:35

## 2025-02-06 RX ADMIN — ARSENIC TRIOXIDE 13 MG: 1 INJECTION, SOLUTION INTRAVENOUS at 11:59

## 2025-02-06 RX ADMIN — FESOTERODINE FUMARATE 8 MG: 8 TABLET, FILM COATED, EXTENDED RELEASE ORAL at 08:36

## 2025-02-06 RX ADMIN — HYDROCORTISONE: 1 CREAM TOPICAL at 19:38

## 2025-02-06 RX ADMIN — MIRABEGRON 50 MG: 50 TABLET, FILM COATED, EXTENDED RELEASE ORAL at 08:36

## 2025-02-06 RX ADMIN — LEVOTHYROXINE SODIUM 175 MCG: 150 TABLET ORAL at 06:15

## 2025-02-06 RX ADMIN — CETIRIZINE HYDROCHLORIDE 10 MG: 10 TABLET, FILM COATED ORAL at 19:37

## 2025-02-06 RX ADMIN — LOPERAMIDE HYDROCHLORIDE 2 MG: 2 CAPSULE ORAL at 19:38

## 2025-02-06 RX ADMIN — POTASSIUM CHLORIDE 20 MEQ: 400 INJECTION, SOLUTION INTRAVENOUS at 10:21

## 2025-02-06 RX ADMIN — HYDROCORTISONE: 1 CREAM TOPICAL at 08:35

## 2025-02-06 RX ADMIN — Medication 6 MG: at 19:37

## 2025-02-06 RX ADMIN — BENZONATATE 100 MG: 100 CAPSULE ORAL at 19:38

## 2025-02-06 RX ADMIN — POTASSIUM CHLORIDE 20 MEQ: 400 INJECTION, SOLUTION INTRAVENOUS at 07:17

## 2025-02-06 RX ADMIN — SODIUM CHLORIDE, PRESERVATIVE FREE 10 ML: 5 INJECTION INTRAVENOUS at 08:37

## 2025-02-06 RX ADMIN — SODIUM CHLORIDE, PRESERVATIVE FREE 10 ML: 5 INJECTION INTRAVENOUS at 12:07

## 2025-02-06 ASSESSMENT — PAIN SCALES - GENERAL
PAINLEVEL_OUTOF10: 0

## 2025-02-06 NOTE — PROGRESS NOTES
[facility] Progress Note    2025     Karan Fortune    MRN: [id number]    : 1950    Referring MD: No referring provider defined for this encounter.    Interval History:  -Tolerating chemotherapy well  - WBC and platelets trending up  - Plan BM asp and biopsy on Monday.      ECOG PS:  (2) Ambulatory and capable of self care, unable to carry out work activity, up and about > 50% or waking hours    KPS: 60% Requires occasional assistance, but is able to care for most of his personal needs    Isolation: None    Medications    Scheduled Meds:   mirabegron  50 mg Oral Daily    Fesoterodine Fumarate ER  8 mg Oral Daily    QUEtiapine  100 mg Oral Nightly    hydrocortisone   Topical BID    arsenic trioxide (TRISENOX) 13 mg in sodium chloride 0.9 % 250 mL IVPB  13 mg IntraVENous Q24H    cetirizine  10 mg Oral Nightly    donepezil  10 mg Oral Daily    sodium chloride flush  5-40 mL IntraVENous 2 times per day    Saline Mouthwash  15 mL Swish & Spit 4x Daily AC & HS    valACYclovir  500 mg Oral BID    fluconazole  200 mg Oral Daily    levoFLOXacin  500 mg Oral Daily    sodium chloride flush  5-40 mL IntraVENous 2 times per day    tretinoin  22.5 mg/m2 Oral Daily with breakfast    And    tretinoin  22.5 mg/m2 Oral Dinner    levothyroxine  175 mcg Oral Daily     Continuous Infusions:   potassium chloride 40 mEq, magnesium sulfate 1,000 mg in sodium chloride 0.9 % 1,000 mL infusion 50 mL/hr at 25 0206    sodium chloride      sodium chloride      sodium chloride      sodium chloride      sodium chloride 5 mL/hr at 25 0559     PRN Meds:.[COMPLETED] loperamide **FOLLOWED BY** loperamide, benzonatate, melatonin, hydrALAZINE, sodium chloride, magnesium sulfate, prochlorperazine **OR** prochlorperazine, potassium chloride, sodium chloride, traMADol, sodium chloride, sodium chloride flush, sodium chloride, ALTEplase (CATHFLO) 2 mg in sterile water 2 mL injection, sodium chloride flush, sodium chloride    ROS:  As  Rafael Jason at    -Continue Aricept  -Continue Seroquel 100 mg PO QHS   Hypersomnolence   -Hold Adderall 20 mg PO QD   Muscle rigidity/ spasms   -OK for baclofen    Confusion/ Delirium   -Neurology consulted 1/27/25:  -Delirium can be associated with Valtrex and psychosis can occasionally be associated with Levaquin.   -Increased nocturnal dose of Seroquel to 100mg (1/28/25)    9. Endocrine   History of Thyroid Cancer s/p thyroidectomy   Post ablative hypothyroidism   -Continue levothyroxine 175 mcg     10. ENT   - Septorhinoplasty in 2023  Allergic rhinitis   -Xyzal not on formulary; started zyrtec     11.    Overactive Bladder  -Continue Trospium   - Add Fesoterodine - home med one daily       - DVT Prophylaxis: Platelets <50,000 cells/dL - prophylactic lovenox on hold and mechanical prophylaxis with bilateral SCDs while in bed in place.  Contraindications to pharmacologic prophylaxis: Thrombocytopenia  Contraindications to mechanical prophylaxis: None    - Disposition: Unknown at this time    The patient was seen and examined by LUISITO Phelps - CNP    Rowdy Decker MD  [facility]  [phone]

## 2025-02-06 NOTE — PLAN OF CARE
Problem: Safety - Adult  Goal: Free from fall injury  Outcome: Progressing  Note: No fall this shift

## 2025-02-06 NOTE — PSYCHOTHERAPY
Behavioral Health Note    Met with patient and wife briefly to introduce self and services. Patient reports father was a psychiatrist. Patient has lewy body dementia. Wife states patient was fairly functional at home with main problem being short-term memory. However, being in the hospital has been difficult for patient. His neurologist started him on Seroquel 50mg at night due to restlessness and agitation at night. Neurology team at [facility] increased to 100mg. Wife states that this is helping him sleep at night. Wife reports engaging in self-care by leaving the hospital nightly. Patient and wife denied behavioral health needs at this time.      Ignacia Stone Psy.D., MSCP  Clinical Psychologist

## 2025-02-07 LAB
ALBUMIN SERPL-MCNC: 3.6 G/DL (ref 3.4–5)
ALP SERPL-CCNC: 80 U/L (ref 40–129)
ALT SERPL-CCNC: 13 U/L (ref 10–40)
ANION GAP SERPL CALCULATED.3IONS-SCNC: 12 MMOL/L (ref 3–16)
APTT BLD: 36.1 SEC (ref 22.1–36.4)
AST SERPL-CCNC: 30 U/L (ref 15–37)
BASOPHILS # BLD: 0 K/UL (ref 0–0.2)
BASOPHILS NFR BLD: 0 %
BILIRUB DIRECT SERPL-MCNC: 0.2 MG/DL (ref 0–0.3)
BILIRUB INDIRECT SERPL-MCNC: 0.3 MG/DL (ref 0–1)
BILIRUB SERPL-MCNC: 0.5 MG/DL (ref 0–1)
BUN SERPL-MCNC: 7 MG/DL (ref 7–20)
CALCIUM SERPL-MCNC: 8.2 MG/DL (ref 8.3–10.6)
CHLORIDE SERPL-SCNC: 108 MMOL/L (ref 99–110)
CO2 SERPL-SCNC: 20 MMOL/L (ref 21–32)
CREAT SERPL-MCNC: 0.6 MG/DL (ref 0.8–1.3)
DEPRECATED RDW RBC AUTO: 18.5 % (ref 12.4–15.4)
EOSINOPHIL # BLD: 0 K/UL (ref 0–0.6)
EOSINOPHIL NFR BLD: 0 %
FIBRINOGEN PPP-MCNC: 229 MG/DL (ref 227–534)
GFR SERPLBLD CREATININE-BSD FMLA CKD-EPI: >90 ML/MIN/{1.73_M2}
GLUCOSE SERPL-MCNC: 108 MG/DL (ref 70–99)
HCT VFR BLD AUTO: 23.3 % (ref 40.5–52.5)
HGB BLD-MCNC: 7.9 G/DL (ref 13.5–17.5)
INR PPP: 1.25 (ref 0.85–1.15)
LDH SERPL L TO P-CCNC: 236 U/L (ref 100–190)
LYMPHOCYTES # BLD: 0.8 K/UL (ref 1–5.1)
LYMPHOCYTES NFR BLD: 21 %
MACROCYTES BLD QL SMEAR: ABNORMAL
MAGNESIUM SERPL-MCNC: 2.13 MG/DL (ref 1.8–2.4)
MCH RBC QN AUTO: 34.7 PG (ref 26–34)
MCHC RBC AUTO-ENTMCNC: 34.1 G/DL (ref 31–36)
MCV RBC AUTO: 101.7 FL (ref 80–100)
METAMYELOCYTES NFR BLD MANUAL: 2 %
MICROCYTES BLD QL SMEAR: ABNORMAL
MONOCYTES # BLD: 0.2 K/UL (ref 0–1.3)
MONOCYTES NFR BLD: 5 %
NEUTROPHILS # BLD: 2.9 K/UL (ref 1.7–7.7)
NEUTROPHILS NFR BLD: 71 %
NEUTS BAND NFR BLD MANUAL: 1 % (ref 0–7)
PATH INTERP BLD-IMP: NO
PHOSPHATE SERPL-MCNC: 4 MG/DL (ref 2.5–4.9)
PLATELET # BLD AUTO: 98 K/UL (ref 135–450)
PLATELET BLD QL SMEAR: ABNORMAL
PMV BLD AUTO: 9.8 FL (ref 5–10.5)
POTASSIUM SERPL-SCNC: 4 MMOL/L (ref 3.5–5.1)
PROT SERPL-MCNC: 5.5 G/DL (ref 6.4–8.2)
PROTHROMBIN TIME: 15.9 SEC (ref 11.9–14.9)
RBC # BLD AUTO: 2.29 M/UL (ref 4.2–5.9)
SLIDE REVIEW: ABNORMAL
SODIUM SERPL-SCNC: 140 MMOL/L (ref 136–145)
URATE SERPL-MCNC: 4.6 MG/DL (ref 3.5–7.2)
WBC # BLD AUTO: 3.9 K/UL (ref 4–11)

## 2025-02-07 PROCEDURE — 84550 ASSAY OF BLOOD/URIC ACID: CPT

## 2025-02-07 PROCEDURE — 2580000003 HC RX 258

## 2025-02-07 PROCEDURE — 2580000003 HC RX 258: Performed by: INTERNAL MEDICINE

## 2025-02-07 PROCEDURE — 2500000003 HC RX 250 WO HCPCS

## 2025-02-07 PROCEDURE — 83735 ASSAY OF MAGNESIUM: CPT

## 2025-02-07 PROCEDURE — 85025 COMPLETE CBC W/AUTO DIFF WBC: CPT

## 2025-02-07 PROCEDURE — 6360000002 HC RX W HCPCS

## 2025-02-07 PROCEDURE — 84100 ASSAY OF PHOSPHORUS: CPT

## 2025-02-07 PROCEDURE — 85384 FIBRINOGEN ACTIVITY: CPT

## 2025-02-07 PROCEDURE — 80076 HEPATIC FUNCTION PANEL: CPT

## 2025-02-07 PROCEDURE — 80048 BASIC METABOLIC PNL TOTAL CA: CPT

## 2025-02-07 PROCEDURE — 6370000000 HC RX 637 (ALT 250 FOR IP): Performed by: NURSE PRACTITIONER

## 2025-02-07 PROCEDURE — 85610 PROTHROMBIN TIME: CPT

## 2025-02-07 PROCEDURE — 2060000000 HC ICU INTERMEDIATE R&B

## 2025-02-07 PROCEDURE — 6370000000 HC RX 637 (ALT 250 FOR IP): Performed by: INTERNAL MEDICINE

## 2025-02-07 PROCEDURE — 83615 LACTATE (LD) (LDH) ENZYME: CPT

## 2025-02-07 PROCEDURE — 6370000000 HC RX 637 (ALT 250 FOR IP): Performed by: STUDENT IN AN ORGANIZED HEALTH CARE EDUCATION/TRAINING PROGRAM

## 2025-02-07 PROCEDURE — 6370000000 HC RX 637 (ALT 250 FOR IP)

## 2025-02-07 PROCEDURE — 6360000002 HC RX W HCPCS: Performed by: INTERNAL MEDICINE

## 2025-02-07 PROCEDURE — 36592 COLLECT BLOOD FROM PICC: CPT

## 2025-02-07 PROCEDURE — 85730 THROMBOPLASTIN TIME PARTIAL: CPT

## 2025-02-07 RX ORDER — TRETINOIN 10 MG/1
50 CAPSULE ORAL 2 TIMES DAILY
Qty: 300 CAPSULE | Refills: 3 | Status: SHIPPED | OUTPATIENT
Start: 2025-02-07 | End: 2025-06-07

## 2025-02-07 RX ORDER — HYDROXYZINE HYDROCHLORIDE 10 MG/1
10 TABLET, FILM COATED ORAL 3 TIMES DAILY PRN
Status: DISCONTINUED | OUTPATIENT
Start: 2025-02-07 | End: 2025-02-13 | Stop reason: HOSPADM

## 2025-02-07 RX ORDER — HYDROXYZINE PAMOATE 25 MG/1
25 CAPSULE ORAL 3 TIMES DAILY PRN
Status: DISCONTINUED | OUTPATIENT
Start: 2025-02-07 | End: 2025-02-07

## 2025-02-07 RX ORDER — ENOXAPARIN SODIUM 100 MG/ML
40 INJECTION SUBCUTANEOUS EVERY EVENING
Status: DISCONTINUED | OUTPATIENT
Start: 2025-02-07 | End: 2025-02-13 | Stop reason: HOSPADM

## 2025-02-07 RX ADMIN — POTASSIUM CHLORIDE 20 MEQ: 400 INJECTION, SOLUTION INTRAVENOUS at 09:21

## 2025-02-07 RX ADMIN — BENZONATATE 100 MG: 100 CAPSULE ORAL at 08:27

## 2025-02-07 RX ADMIN — BENZONATATE 100 MG: 100 CAPSULE ORAL at 16:34

## 2025-02-07 RX ADMIN — QUETIAPINE FUMARATE 100 MG: 25 TABLET ORAL at 22:03

## 2025-02-07 RX ADMIN — CETIRIZINE HYDROCHLORIDE 10 MG: 10 TABLET, FILM COATED ORAL at 22:03

## 2025-02-07 RX ADMIN — Medication 6 MG: at 22:03

## 2025-02-07 RX ADMIN — HYDROXYZINE HYDROCHLORIDE 10 MG: 10 TABLET, FILM COATED ORAL at 13:22

## 2025-02-07 RX ADMIN — DONEPEZIL HYDROCHLORIDE 10 MG: 10 TABLET ORAL at 08:27

## 2025-02-07 RX ADMIN — ARSENIC TRIOXIDE 13 MG: 1 INJECTION, SOLUTION INTRAVENOUS at 13:05

## 2025-02-07 RX ADMIN — POTASSIUM CHLORIDE 20 MEQ: 400 INJECTION, SOLUTION INTRAVENOUS at 07:31

## 2025-02-07 RX ADMIN — ENOXAPARIN SODIUM 40 MG: 100 INJECTION SUBCUTANEOUS at 17:35

## 2025-02-07 RX ADMIN — MIRABEGRON 50 MG: 50 TABLET, FILM COATED, EXTENDED RELEASE ORAL at 08:27

## 2025-02-07 RX ADMIN — SODIUM CHLORIDE, PRESERVATIVE FREE 10 ML: 5 INJECTION INTRAVENOUS at 08:28

## 2025-02-07 RX ADMIN — HYDROCORTISONE: 1 CREAM TOPICAL at 22:04

## 2025-02-07 RX ADMIN — TRETINOIN 50 MG: 10 CAPSULE ORAL at 08:32

## 2025-02-07 RX ADMIN — FESOTERODINE FUMARATE 8 MG: 8 TABLET, FILM COATED, EXTENDED RELEASE ORAL at 08:27

## 2025-02-07 RX ADMIN — TRETINOIN 50 MG: 10 CAPSULE ORAL at 17:34

## 2025-02-07 RX ADMIN — LEVOTHYROXINE SODIUM 175 MCG: 150 TABLET ORAL at 05:53

## 2025-02-07 RX ADMIN — POTASSIUM CHLORIDE: 2 INJECTION, SOLUTION, CONCENTRATE INTRAVENOUS at 17:32

## 2025-02-07 RX ADMIN — POTASSIUM CHLORIDE 20 MEQ: 400 INJECTION, SOLUTION INTRAVENOUS at 05:51

## 2025-02-07 RX ADMIN — POTASSIUM CHLORIDE 20 MEQ: 400 INJECTION, SOLUTION INTRAVENOUS at 04:08

## 2025-02-07 RX ADMIN — HYDROCORTISONE: 1 CREAM TOPICAL at 08:31

## 2025-02-07 NOTE — PROGRESS NOTES
Central line dressing changed using sterile technique following hospital policy. Radha Tidwell RN, observed with procedure to ensure proper technique.

## 2025-02-07 NOTE — PROGRESS NOTES
[facility] Progress Note    2025     Karan Fortune    MRN: [id number]    : 1950    Referring MD: No referring provider defined for this encounter.    Interval History:  -Tolerating chemotherapy well  - WBC and platelets trending up  - Plan BM asp and biopsy on Monday.      ECOG PS:  (2) Ambulatory and capable of self care, unable to carry out work activity, up and about > 50% or waking hours    KPS: 60% Requires occasional assistance, but is able to care for most of his personal needs    Isolation: None    Medications    Scheduled Meds:   mirabegron  50 mg Oral Daily    Fesoterodine Fumarate ER  8 mg Oral Daily    QUEtiapine  100 mg Oral Nightly    hydrocortisone   Topical BID    arsenic trioxide (TRISENOX) 13 mg in sodium chloride 0.9 % 250 mL IVPB  13 mg IntraVENous Q24H    cetirizine  10 mg Oral Nightly    donepezil  10 mg Oral Daily    sodium chloride flush  5-40 mL IntraVENous 2 times per day    Saline Mouthwash  15 mL Swish & Spit 4x Daily AC & HS    sodium chloride flush  5-40 mL IntraVENous 2 times per day    tretinoin  22.5 mg/m2 Oral Daily with breakfast    And    tretinoin  22.5 mg/m2 Oral Dinner    levothyroxine  175 mcg Oral Daily     Continuous Infusions:   potassium chloride 40 mEq, magnesium sulfate 1,000 mg in sodium chloride 0.9 % 1,000 mL infusion 50 mL/hr at 25 2344    sodium chloride      sodium chloride      sodium chloride      sodium chloride      sodium chloride 5 mL/hr at 25 0559     PRN Meds:.[COMPLETED] loperamide **FOLLOWED BY** loperamide, benzonatate, melatonin, hydrALAZINE, sodium chloride, magnesium sulfate, prochlorperazine **OR** prochlorperazine, potassium chloride, sodium chloride, traMADol, sodium chloride, sodium chloride flush, sodium chloride, ALTEplase (CATHFLO) 2 mg in sterile water 2 mL injection, sodium chloride flush, sodium chloride    ROS:  As noted above, otherwise remainder of 10-point ROS negative    Physical Exam:     I&O:    Intake/Output  Seroquel 100 mg PO QHS   Hypersomnolence   -Hold Adderall 20 mg PO QD   Muscle rigidity/ spasms   -OK for baclofen    Confusion/ Delirium   -Neurology consulted 1/27/25:  -Delirium can be associated with Valtrex and psychosis can occasionally be associated with Levaquin.   -Increased nocturnal dose of Seroquel to 100mg (1/28/25)    9. Endocrine   History of Thyroid Cancer s/p thyroidectomy   Post ablative hypothyroidism   -Continue levothyroxine 175 mcg     10. ENT   - Septorhinoplasty in 2023  Allergic rhinitis   -Xyzal not on formulary; started zyrtec     11.    Overactive Bladder  -Continue Trospium   - Add Fesoterodine - home med one daily       - DVT Prophylaxis: Platelets >50,000 cells/dL, - daily lovenox prophylaxis ordered  Contraindications to pharmacologic prophylaxis: None  Contraindications to mechanical prophylaxis: None    - Disposition: Unknown at this time    The patient was seen and examined by Dr. Brianne Nolan, APRN - CNP    Rowdy Decker MD  [facility]  [phone]

## 2025-02-07 NOTE — PLAN OF CARE
Problem: Skin/Tissue Integrity - Adult  Goal: Skin integrity remains intact  2/7/2025 1811 by Dorothea Hoang, RN  Outcome: Progressing  No new skin breakdown this shift.    Problem: ABCDS Injury Assessment  Goal: Absence of physical injury  Outcome: Progressing  Pt has sitter at bedside this shift.    Problem: Neurosensory - Adult  Goal: Achieves stable or improved neurological status  Outcome: Progressing  Pt A&O x2 this shift. Pt able to state name and place.     Problem: Respiratory - Adult  Goal: Achieves optimal ventilation and oxygenation  Outcome: Progressing  Pt remains on room air this shift.     Problem: Cardiovascular - Adult  Goal: Maintains optimal cardiac output and hemodynamic stability  Outcome: Progressing  Pt remains on tele this shift, NSR to sinus tach     Problem: Musculoskeletal - Adult  Goal: Return mobility to safest level of function  Outcome: Progressing  Pt up with assist x1, call light within reach, bed alarm in use     Problem: Gastrointestinal - Adult  Goal: Minimal or absence of nausea and vomiting  Outcome: Progressing  No complaints of nausea or vomiting this shift.     Problem: Genitourinary - Adult  Goal: Absence of urinary retention  Outcome: Progressing  Pt able to use restroom appropriately this shift.     Problem: Infection - Adult  Goal: Absence of infection during hospitalization  Outcome: Progressing  Pt remained afebrile this shift.     Problem: Metabolic/Fluid and Electrolytes - Adult  Goal: Electrolytes maintained within normal limits  Outcome: Progressing  Pt received K this shift.     Problem: Hematologic - Adult  Goal: Maintains hematologic stability  Outcome: Progressing   Patient's hemoglobin this AM:   Recent Labs     02/07/25  0321   HGB 7.9*     Patient's platelet count this AM:   Recent Labs     02/07/25  0321   PLT 98*    Thrombocytopenia Precautions in place.  Patient showing no signs or symptoms of active bleeding.  Transfusion not indicated at this time.

## 2025-02-07 NOTE — CARE COORDINATION
Attended MD rounds.     No SW needs identified.     Pt will return home with his wife (24/7) and no anticipated SW needs.     SW will follow    PARVIN Aguirre   for [facility] ([facility])  [employer] Mobile: [phone]

## 2025-02-07 NOTE — ONCOLOGY
Administration: Chemotherapy drug Arsenic independently verified with Breonna HANNA RN prior to administration.  Acknowledgement of informed consent for chemotherapy administration verified.  Original order, appropriateness of regimen, drug supplied, height, weight, BSA, dose calculations, expiration dates/times, drug appearance, and two patient identifiers were verified by both RNs.  Drug checked for vesicant/irritant status and for risk of hypersensitivity.  Most recent laboratory values and allergies, were reviewed.  Positive, brisk blood return via CVC was confirmed prior to administration. Chest x-ray for correct line placement reviewed. Dorothea Hoang RN and Breonna James RN verified correct rate of chemotherapy and maintenance IV fluids.  Patient was educated on chemotherapy regimen prior to administration including indication for treatment related to disease & side effects of chemotherapy drug.  Patient verbalizes understanding of all instructions.     Completion of Chemotherapy: Monitoring during infusion done per policy, see Flowsheets.  Blood return verified before, during, and after infusion per policy; no signs of extravasation.  Pt tolerating chemotherapy well and without incident.  Chemotherapy infusion end time on the MAR.  Will continue to monitor.

## 2025-02-07 NOTE — PLAN OF CARE
Problem: Safety - Adult  Goal: Free from fall injury  Outcome: Progressing  Note: No fall this shift       Problem: Skin/Tissue Integrity - Adult  Goal: Skin integrity remains intact  Outcome: Progressing  Note: No new skin breakdown noted this shift.

## 2025-02-07 NOTE — CARE COORDINATION
Atra sent to [facility] in preparation of potential discharge next week.

## 2025-02-07 NOTE — PROGRESS NOTES
Comprehensive Nutrition Assessment    RECOMMENDATIONS:  PO Diet: Continue Regular;Low Microbial  Nutrition Supplement: Muscle Milk 1 per day(provided by family)  Nutrition Education: Education/Counseling initiated (low microbial diet)     NUTRITION ASSESSMENT:   Nutritional summary & status: Follow up. PO intake remains poor at 26-50% of meals which consists mostly of cereal and fruit. PCA in room reports pt still drinking Muscle Milk as he dislikes Ensure supplements. Wife bringing in food for pt in hopes of improved intake. WBC and platelets improving. BM bx planned for Monday per MD. Continue to follow.   Admission // PMH: Acute promyelocyticm leukemia // hypothyroidism, Thyroid cancer s/p thyroidectomy, Lewy body dementia, Sensorineural hearing loss bilaterally    MALNUTRITION ASSESSMENT  Context of Malnutrition: Acute Illness   Malnutrition Status: Moderate malnutrition  Findings of the 6 clinical characteristics of malnutrition (Minimum of 2 out of 6 clinical characteristics is required to make the diagnosis of moderate or severe Protein Calorie Malnutrition based on AND/ASPEN Guidelines):  Energy Intake:  50% or less of estimated energy requirements for 5 or more days  Weight Loss:  1% to 2% over 1 week (4% in 14 days/ 2% in 1 week)     Body Fat Loss:  No body fat loss     Muscle Mass Loss:  No muscle mass loss      NUTRITION DIAGNOSIS   Moderate malnutrition, in context of acute illness or injury related to decreased appetite, catabolic illness as evidenced by criteria as identified in malnutrition assessment    Nutrition Monitoring and Evaluation:   Food/Nutrient Intake Outcomes:  Food and Nutrient Intake  Physical Signs/Symptoms Outcomes:  Biochemical Data, Skin, Weight, Nutrition Focused Physical Findings     OBJECTIVE DATA: Significant to nutrition assessment  Nutrition Related Findings: Glu 108.LBM2/08.BUE trace;BLE +1  Wounds: None  Nutrition Goals: PO intake 50% or greater, by next RD assessment

## 2025-02-08 LAB
ALBUMIN SERPL-MCNC: 3.5 G/DL (ref 3.4–5)
ALP SERPL-CCNC: 78 U/L (ref 40–129)
ALT SERPL-CCNC: 13 U/L (ref 10–40)
ANION GAP SERPL CALCULATED.3IONS-SCNC: 12 MMOL/L (ref 3–16)
AST SERPL-CCNC: 31 U/L (ref 15–37)
BASOPHILS # BLD: 0 K/UL (ref 0–0.2)
BASOPHILS NFR BLD: 0 %
BILIRUB DIRECT SERPL-MCNC: 0.2 MG/DL (ref 0–0.3)
BILIRUB INDIRECT SERPL-MCNC: 0.3 MG/DL (ref 0–1)
BILIRUB SERPL-MCNC: 0.5 MG/DL (ref 0–1)
BUN SERPL-MCNC: 8 MG/DL (ref 7–20)
CALCIUM SERPL-MCNC: 8.1 MG/DL (ref 8.3–10.6)
CHLORIDE SERPL-SCNC: 108 MMOL/L (ref 99–110)
CO2 SERPL-SCNC: 18 MMOL/L (ref 21–32)
CREAT SERPL-MCNC: 0.6 MG/DL (ref 0.8–1.3)
DEPRECATED RDW RBC AUTO: 19.7 % (ref 12.4–15.4)
EOSINOPHIL # BLD: 0 K/UL (ref 0–0.6)
EOSINOPHIL NFR BLD: 1 %
GFR SERPLBLD CREATININE-BSD FMLA CKD-EPI: >90 ML/MIN/{1.73_M2}
GLUCOSE SERPL-MCNC: 109 MG/DL (ref 70–99)
HCT VFR BLD AUTO: 21.9 % (ref 40.5–52.5)
HGB BLD-MCNC: 7.3 G/DL (ref 13.5–17.5)
LYMPHOCYTES # BLD: 0.8 K/UL (ref 1–5.1)
LYMPHOCYTES NFR BLD: 21 %
MACROCYTES BLD QL SMEAR: ABNORMAL
MAGNESIUM SERPL-MCNC: 2.19 MG/DL (ref 1.8–2.4)
MCH RBC QN AUTO: 34.4 PG (ref 26–34)
MCHC RBC AUTO-ENTMCNC: 33.4 G/DL (ref 31–36)
MCV RBC AUTO: 103.1 FL (ref 80–100)
METAMYELOCYTES NFR BLD MANUAL: 1 %
MICROCYTES BLD QL SMEAR: ABNORMAL
MONOCYTES # BLD: 0.2 K/UL (ref 0–1.3)
MONOCYTES NFR BLD: 4 %
NEUTROPHILS # BLD: 2.9 K/UL (ref 1.7–7.7)
NEUTROPHILS NFR BLD: 72 %
NEUTS BAND NFR BLD MANUAL: 1 % (ref 0–7)
NRBC BLD-RTO: 1 /100 WBC
OVALOCYTES BLD QL SMEAR: ABNORMAL
PHOSPHATE SERPL-MCNC: 4.3 MG/DL (ref 2.5–4.9)
PLATELET # BLD AUTO: 120 K/UL (ref 135–450)
PLATELET BLD QL SMEAR: ABNORMAL
PMV BLD AUTO: 9.4 FL (ref 5–10.5)
POLYCHROMASIA BLD QL SMEAR: ABNORMAL
POTASSIUM SERPL-SCNC: 4.2 MMOL/L (ref 3.5–5.1)
PROT SERPL-MCNC: 5.6 G/DL (ref 6.4–8.2)
RBC # BLD AUTO: 2.13 M/UL (ref 4.2–5.9)
SCHISTOCYTES BLD QL SMEAR: ABNORMAL
SLIDE REVIEW: ABNORMAL
SODIUM SERPL-SCNC: 138 MMOL/L (ref 136–145)
WBC # BLD AUTO: 3.9 K/UL (ref 4–11)

## 2025-02-08 PROCEDURE — 80076 HEPATIC FUNCTION PANEL: CPT

## 2025-02-08 PROCEDURE — 6370000000 HC RX 637 (ALT 250 FOR IP): Performed by: NURSE PRACTITIONER

## 2025-02-08 PROCEDURE — 2580000003 HC RX 258: Performed by: INTERNAL MEDICINE

## 2025-02-08 PROCEDURE — 84100 ASSAY OF PHOSPHORUS: CPT

## 2025-02-08 PROCEDURE — 2500000003 HC RX 250 WO HCPCS

## 2025-02-08 PROCEDURE — 6370000000 HC RX 637 (ALT 250 FOR IP): Performed by: STUDENT IN AN ORGANIZED HEALTH CARE EDUCATION/TRAINING PROGRAM

## 2025-02-08 PROCEDURE — 2060000000 HC ICU INTERMEDIATE R&B

## 2025-02-08 PROCEDURE — 36592 COLLECT BLOOD FROM PICC: CPT

## 2025-02-08 PROCEDURE — 6370000000 HC RX 637 (ALT 250 FOR IP)

## 2025-02-08 PROCEDURE — 6360000002 HC RX W HCPCS: Performed by: INTERNAL MEDICINE

## 2025-02-08 PROCEDURE — 83735 ASSAY OF MAGNESIUM: CPT

## 2025-02-08 PROCEDURE — 85025 COMPLETE CBC W/AUTO DIFF WBC: CPT

## 2025-02-08 PROCEDURE — 80048 BASIC METABOLIC PNL TOTAL CA: CPT

## 2025-02-08 RX ORDER — GUAIFENESIN 200 MG/10ML
LIQUID ORAL 2 TIMES DAILY
Status: DISCONTINUED | OUTPATIENT
Start: 2025-02-08 | End: 2025-02-13 | Stop reason: HOSPADM

## 2025-02-08 RX ADMIN — SODIUM CHLORIDE 15 ML: 900 IRRIGANT IRRIGATION at 20:04

## 2025-02-08 RX ADMIN — ENOXAPARIN SODIUM 40 MG: 100 INJECTION SUBCUTANEOUS at 16:59

## 2025-02-08 RX ADMIN — Medication: at 20:02

## 2025-02-08 RX ADMIN — SODIUM CHLORIDE, PRESERVATIVE FREE 10 ML: 5 INJECTION INTRAVENOUS at 20:05

## 2025-02-08 RX ADMIN — SODIUM CHLORIDE, PRESERVATIVE FREE 10 ML: 5 INJECTION INTRAVENOUS at 09:06

## 2025-02-08 RX ADMIN — LEVOTHYROXINE SODIUM 175 MCG: 150 TABLET ORAL at 08:56

## 2025-02-08 RX ADMIN — Medication 6 MG: at 20:02

## 2025-02-08 RX ADMIN — Medication: at 16:59

## 2025-02-08 RX ADMIN — TRETINOIN 50 MG: 10 CAPSULE ORAL at 16:59

## 2025-02-08 RX ADMIN — MIRABEGRON 50 MG: 50 TABLET, FILM COATED, EXTENDED RELEASE ORAL at 08:58

## 2025-02-08 RX ADMIN — HYDROCORTISONE: 1 CREAM TOPICAL at 20:02

## 2025-02-08 RX ADMIN — BENZONATATE 100 MG: 100 CAPSULE ORAL at 20:02

## 2025-02-08 RX ADMIN — SODIUM CHLORIDE, PRESERVATIVE FREE 10 ML: 5 INJECTION INTRAVENOUS at 12:55

## 2025-02-08 RX ADMIN — ARSENIC TRIOXIDE 13 MG: 1 INJECTION, SOLUTION INTRAVENOUS at 12:50

## 2025-02-08 RX ADMIN — DONEPEZIL HYDROCHLORIDE 10 MG: 10 TABLET ORAL at 08:57

## 2025-02-08 RX ADMIN — HYDROCORTISONE: 1 CREAM TOPICAL at 08:56

## 2025-02-08 RX ADMIN — FESOTERODINE FUMARATE 8 MG: 8 TABLET, FILM COATED, EXTENDED RELEASE ORAL at 08:58

## 2025-02-08 RX ADMIN — CETIRIZINE HYDROCHLORIDE 10 MG: 10 TABLET, FILM COATED ORAL at 20:02

## 2025-02-08 RX ADMIN — QUETIAPINE FUMARATE 100 MG: 25 TABLET ORAL at 20:02

## 2025-02-08 RX ADMIN — TRETINOIN 50 MG: 10 CAPSULE ORAL at 08:57

## 2025-02-08 RX ADMIN — BENZONATATE 100 MG: 100 CAPSULE ORAL at 12:57

## 2025-02-08 ASSESSMENT — PAIN SCALES - GENERAL
PAINLEVEL_OUTOF10: 0

## 2025-02-08 NOTE — PLAN OF CARE
Problem: Safety - Adult  Goal: Free from fall injury  2/8/2025 0454 by Jenae Youngblood RN  Outcome: Progressing  Flowsheets (Taken 2/8/2025 0454)  Free From Fall Injury: Instruct family/caregiver on patient safety     Problem: ABCDS Injury Assessment  Goal: Absence of physical injury  2/8/2025 0454 by Jenae Youngblood RN  Outcome: Progressing  Flowsheets (Taken 2/8/2025 0454)  Absence of Physical Injury: Implement safety measures based on patient assessment     Problem: Respiratory - Adult  Goal: Achieves optimal ventilation and oxygenation  2/8/2025 0454 by Jenae Youngblood RN  Outcome: Progressing  Flowsheets (Taken 2/8/2025 0454)  Achieves optimal ventilation and oxygenation:   Assess for changes in respiratory status   Assess for changes in mentation and behavior   Position to facilitate oxygenation and minimize respiratory effort     Problem: Skin/Tissue Integrity - Adult  Goal: Skin integrity remains intact  2/8/2025 0454 by Jenae Youngblood RN  Outcome: Progressing  Flowsheets (Taken 2/8/2025 0454)  Skin Integrity Remains Intact: Monitor for areas of redness and/or skin breakdown     Problem: Musculoskeletal - Adult  Goal: Return mobility to safest level of function  2/8/2025 0454 by Jenae Youngblood RN  Outcome: Progressing  Flowsheets (Taken 2/8/2025 0454)  Return Mobility to Safest Level of Function:   Assess patient stability and activity tolerance for standing, transferring and ambulating with or without assistive devices   Assist with transfers and ambulation using safe patient handling equipment as needed   Instruct patient/family in ordered activity level

## 2025-02-08 NOTE — PLAN OF CARE
Problem: Safety - Adult  Goal: Free from fall injury  2/8/2025 1839 by Wendy Sims RN  Outcome: Progressing  Flowsheets (Taken 2/8/2025 0454 by Jenae Youngblood RN)  Free From Fall Injury: Instruct family/caregiver on patient safety  Note: + High Fall Risk per DALE/ABCDS: Explained fall risk precautions to patient and family and rationale behind their use to keep the patient safe. Patient bed is in low position, side rails up x2, call light and belongings are in reach. Bed alarm on. Sitter at bedside, camera in room. Encouraged patient to call for assistance as needed.  2/8/2025 0454 by eJnae Youngblood RN  Outcome: Progressing  Flowsheets (Taken 2/8/2025 0454)  Free From Fall Injury: Instruct family/caregiver on patient safety     Problem: ABCDS Injury Assessment  Goal: Absence of physical injury  2/8/2025 1839 by Wendy Sims RN  Outcome: Progressing  Flowsheets (Taken 2/8/2025 0454 by Jenae Youngblood RN)  Absence of Physical Injury: Implement safety measures based on patient assessment  2/8/2025 0454 by Jenae Youngblood RN  Outcome: Progressing  Flowsheets (Taken 2/8/2025 0454)  Absence of Physical Injury: Implement safety measures based on patient assessment     Problem: Neurosensory - Adult  Goal: Achieves stable or improved neurological status  Outcome: Progressing  Flowsheets (Taken 2/1/2025 0058 by Jenae Youngblood RN)  Achieves stable or improved neurological status: Assess for and report changes in neurological status     Problem: Respiratory - Adult  Goal: Achieves optimal ventilation and oxygenation  2/8/2025 1839 by Wendy Sims RN  Outcome: Progressing  Flowsheets (Taken 2/8/2025 0454 by Jenae Youngblood RN)  Achieves optimal ventilation and oxygenation:   Assess for changes in respiratory status   Assess for changes in mentation and behavior   Position to facilitate oxygenation and minimize respiratory effort  2/8/2025 0454 by Jenae Youngblood RN  Outcome: Progressing  Flowsheets  1/30/2025 1316 by Irina Barrios, RN)  Absence of urinary retention:   Assess patient’s ability to void and empty bladder   Monitor intake/output and perform bladder scan as needed   Place urinary catheter per Licensed Independent Practitioner order if needed   Discuss with Licensed Independent Practitioner  medications to alleviate retention as needed   Discuss catheterization for long term situations as appropriate     Problem: Infection - Adult  Goal: Absence of infection during hospitalization  Outcome: Progressing  Flowsheets (Taken 2/5/2025 0013 by Jenae Youngblood, RN)  Absence of infection during hospitalization:   Assess and monitor for signs and symptoms of infection   Monitor lab/diagnostic results   Monitor all insertion sites i.e., indwelling lines, tubes and drains   Instruct and encourage patient and family to use good hand hygiene technique  Goal: Absence of fever/infection during anticipated neutropenic period  Outcome: Progressing  Flowsheets (Taken 2/3/2025 1004)  Absence of fever/infection during anticipated neutropenic period:   Monitor white blood cell count   Implement neutropenic guidelines     Problem: Metabolic/Fluid and Electrolytes - Adult  Goal: Electrolytes maintained within normal limits  Outcome: Progressing  Flowsheets (Taken 2/4/2025 1203 by Olinda Farrar, RN)  Electrolytes maintained within normal limits:   Monitor labs and assess patient for signs and symptoms of electrolyte imbalances   Administer electrolyte replacement as ordered  Goal: Hemodynamic stability and optimal renal function maintained  Outcome: Progressing  Flowsheets (Taken 2/3/2025 1004)  Hemodynamic stability and optimal renal function maintained:   Monitor labs and assess for signs and symptoms of volume excess or deficit   Monitor intake, output and patient weight     Problem: Hematologic - Adult  Goal: Maintains hematologic stability  Outcome: Progressing  Flowsheets (Taken 2/4/2025 1203 by Olinda Farrar

## 2025-02-08 NOTE — ONCOLOGY
Administration: Chemotherapy drug Arsenic independently verified with Hollie Heaton RN prior to administration.  Acknowledgement of informed consent for chemotherapy administration verified.  Original order, appropriateness of regimen, drug supplied, height, weight, BSA, dose calculations, expiration dates/times, drug appearance, and two patient identifiers were verified by both RNs.  Drug checked for vesicant/irritant status and for risk of hypersensitivity.  Most recent laboratory values and allergies, were reviewed.  Positive, brisk blood return via CVC was confirmed prior to administration. Chest x-ray for correct line placement reviewed. Wendy Sims RN and Hollie Heaton RN verified correct rate of chemotherapy and maintenance IV fluids.  Patient was educated on chemotherapy regimen prior to administration including indication for treatment related to disease & side effects of chemotherapy drug.  Patient verbalizes understanding of all instructions.    Completion of Chemotherapy: Monitoring during infusion done per policy, see Flowsheets.  Blood return verified before, during, and after infusion per policy; no signs of extravasation.  Patient tolerated chemotherapy well and without incident.  Chemotherapy infusion end time on the MAR.      72

## 2025-02-08 NOTE — PROGRESS NOTES
Patient was lying in bed, talking to his family and stated that he has this black dot he is seeing from his right eyes. PERRLA assessment performed. Pupils 3 mm bilaterally, brisk reaction to light. Informed Indy Nolan NP of new development. Also informed NP that patient is still complaining of itchiness on his back even with the hydrocortisone cream. Eucerin cream ordered by NP, see MAR. Also per NP, \"monitor the floater in his eye for now and let me know if it gets worse.\" Informed patient to notify RN if floater in his eye gets any worse. Patient and wife verbalized understanding.

## 2025-02-08 NOTE — ONCOLOGY
Administration: Chemotherapy drug Arsenic independently verified with Lillie Ortega RN prior to administration.  Acknowledgement of informed consent for chemotherapy administration verified.  Original order, appropriateness of regimen, drug supplied, height, weight, BSA, dose calculations, expiration dates/times, drug appearance, and two patient identifiers were verified by both RNs.  Drug checked for vesicant/irritant status and for risk of hypersensitivity.  Most recent laboratory values and allergies, were reviewed.  Positive, brisk blood return via CVC was confirmed prior to administration. Chest x-ray for correct line placement reviewed. Wendy Sims RN and Lillie Ortega RN verified correct rate of chemotherapy and maintenance IV fluids.  Patient was educated on chemotherapy regimen prior to administration including indication for treatment related to disease & side effects of chemotherapy drug.  Patient verbalizes understanding of all instructions.    Completion of Chemotherapy: Monitoring during infusion done per policy, see Flowsheets.  Blood return verified before, during, and after infusion per policy; no signs of extravasation.  Patient tolerated chemotherapy well and without incident.  Chemotherapy infusion end time on the MAR.

## 2025-02-08 NOTE — PROGRESS NOTES
[facility] Progress Note    2025     Karan Fortune    MRN: [id number]    : 1950    Referring MD: No referring provider defined for this encounter.    Interval History:  -Tolerating chemotherapy well  - WBC and platelets trending up  - Plan BM asp and biopsy on Monday.      ECOG PS:  (2) Ambulatory and capable of self care, unable to carry out work activity, up and about > 50% or waking hours    KPS: 60% Requires occasional assistance, but is able to care for most of his personal needs    Isolation: None    Medications    Scheduled Meds:   enoxaparin  40 mg SubCUTAneous QPM    mirabegron  50 mg Oral Daily    Fesoterodine Fumarate ER  8 mg Oral Daily    QUEtiapine  100 mg Oral Nightly    hydrocortisone   Topical BID    arsenic trioxide (TRISENOX) 13 mg in sodium chloride 0.9 % 250 mL IVPB  13 mg IntraVENous Q24H    cetirizine  10 mg Oral Nightly    donepezil  10 mg Oral Daily    sodium chloride flush  5-40 mL IntraVENous 2 times per day    Saline Mouthwash  15 mL Swish & Spit 4x Daily AC & HS    sodium chloride flush  5-40 mL IntraVENous 2 times per day    tretinoin  22.5 mg/m2 Oral Daily with breakfast    And    tretinoin  22.5 mg/m2 Oral Dinner    levothyroxine  175 mcg Oral Daily     Continuous Infusions:   potassium chloride 40 mEq, magnesium sulfate 1,000 mg in sodium chloride 0.9 % 1,000 mL infusion 50 mL/hr at 25 1732    sodium chloride      sodium chloride      sodium chloride      sodium chloride      sodium chloride 5 mL/hr at 25 0559     PRN Meds:.hydrOXYzine HCl, [COMPLETED] loperamide **FOLLOWED BY** loperamide, benzonatate, melatonin, hydrALAZINE, sodium chloride, magnesium sulfate, prochlorperazine **OR** prochlorperazine, potassium chloride, sodium chloride, traMADol, sodium chloride, sodium chloride flush, sodium chloride, ALTEplase (CATHFLO) 2 mg in sterile water 2 mL injection, sodium chloride flush, sodium chloride    ROS:  As noted above, otherwise remainder of 10-point ROS

## 2025-02-09 LAB
ABO + RH BLD: NORMAL
ALBUMIN SERPL-MCNC: 3.5 G/DL (ref 3.4–5)
ALP SERPL-CCNC: 78 U/L (ref 40–129)
ALT SERPL-CCNC: 11 U/L (ref 10–40)
ANION GAP SERPL CALCULATED.3IONS-SCNC: 13 MMOL/L (ref 3–16)
ANISOCYTOSIS BLD QL SMEAR: ABNORMAL
AST SERPL-CCNC: 30 U/L (ref 15–37)
BASOPHILS # BLD: 0 K/UL (ref 0–0.2)
BASOPHILS NFR BLD: 0 %
BILIRUB DIRECT SERPL-MCNC: 0.2 MG/DL (ref 0–0.3)
BILIRUB INDIRECT SERPL-MCNC: 0.3 MG/DL (ref 0–1)
BILIRUB SERPL-MCNC: 0.5 MG/DL (ref 0–1)
BLD GP AB SCN SERPL QL: NORMAL
BUN SERPL-MCNC: 7 MG/DL (ref 7–20)
CALCIUM SERPL-MCNC: 8 MG/DL (ref 8.3–10.6)
CHLORIDE SERPL-SCNC: 107 MMOL/L (ref 99–110)
CO2 SERPL-SCNC: 17 MMOL/L (ref 21–32)
CREAT SERPL-MCNC: 0.5 MG/DL (ref 0.8–1.3)
DACRYOCYTES BLD QL SMEAR: ABNORMAL
DEPRECATED RDW RBC AUTO: 20.2 % (ref 12.4–15.4)
EOSINOPHIL # BLD: 0.1 K/UL (ref 0–0.6)
EOSINOPHIL NFR BLD: 3 %
GFR SERPLBLD CREATININE-BSD FMLA CKD-EPI: >90 ML/MIN/{1.73_M2}
GLUCOSE SERPL-MCNC: 100 MG/DL (ref 70–99)
HCT VFR BLD AUTO: 23.5 % (ref 40.5–52.5)
HGB BLD-MCNC: 7.9 G/DL (ref 13.5–17.5)
LYMPHOCYTES # BLD: 1 K/UL (ref 1–5.1)
LYMPHOCYTES NFR BLD: 22 %
MAGNESIUM SERPL-MCNC: 2.1 MG/DL (ref 1.8–2.4)
MCH RBC QN AUTO: 34.8 PG (ref 26–34)
MCHC RBC AUTO-ENTMCNC: 33.7 G/DL (ref 31–36)
MCV RBC AUTO: 103.2 FL (ref 80–100)
MONOCYTES # BLD: 0.1 K/UL (ref 0–1.3)
MONOCYTES NFR BLD: 3 %
NEUTROPHILS # BLD: 3.4 K/UL (ref 1.7–7.7)
NEUTROPHILS NFR BLD: 70 %
NEUTS BAND NFR BLD MANUAL: 2 % (ref 0–7)
PHOSPHATE SERPL-MCNC: 4.1 MG/DL (ref 2.5–4.9)
PLATELET # BLD AUTO: 166 K/UL (ref 135–450)
PLATELET BLD QL SMEAR: ADEQUATE
PMV BLD AUTO: 9.6 FL (ref 5–10.5)
POTASSIUM SERPL-SCNC: 4.3 MMOL/L (ref 3.5–5.1)
PROT SERPL-MCNC: 5.5 G/DL (ref 6.4–8.2)
RBC # BLD AUTO: 2.28 M/UL (ref 4.2–5.9)
SLIDE REVIEW: ABNORMAL
SODIUM SERPL-SCNC: 137 MMOL/L (ref 136–145)
TSH SERPL DL<=0.005 MIU/L-ACNC: 0.8 UIU/ML (ref 0.27–4.2)
WBC # BLD AUTO: 4.7 K/UL (ref 4–11)

## 2025-02-09 PROCEDURE — 6370000000 HC RX 637 (ALT 250 FOR IP): Performed by: NURSE PRACTITIONER

## 2025-02-09 PROCEDURE — 80076 HEPATIC FUNCTION PANEL: CPT

## 2025-02-09 PROCEDURE — 86900 BLOOD TYPING SEROLOGIC ABO: CPT

## 2025-02-09 PROCEDURE — 84100 ASSAY OF PHOSPHORUS: CPT

## 2025-02-09 PROCEDURE — 86901 BLOOD TYPING SEROLOGIC RH(D): CPT

## 2025-02-09 PROCEDURE — 84443 ASSAY THYROID STIM HORMONE: CPT

## 2025-02-09 PROCEDURE — 6370000000 HC RX 637 (ALT 250 FOR IP): Performed by: STUDENT IN AN ORGANIZED HEALTH CARE EDUCATION/TRAINING PROGRAM

## 2025-02-09 PROCEDURE — 86850 RBC ANTIBODY SCREEN: CPT

## 2025-02-09 PROCEDURE — 2580000003 HC RX 258: Performed by: INTERNAL MEDICINE

## 2025-02-09 PROCEDURE — 6370000000 HC RX 637 (ALT 250 FOR IP)

## 2025-02-09 PROCEDURE — 85025 COMPLETE CBC W/AUTO DIFF WBC: CPT

## 2025-02-09 PROCEDURE — 2500000003 HC RX 250 WO HCPCS

## 2025-02-09 PROCEDURE — 6360000002 HC RX W HCPCS: Performed by: INTERNAL MEDICINE

## 2025-02-09 PROCEDURE — 6370000000 HC RX 637 (ALT 250 FOR IP): Performed by: INTERNAL MEDICINE

## 2025-02-09 PROCEDURE — 83735 ASSAY OF MAGNESIUM: CPT

## 2025-02-09 PROCEDURE — 2060000000 HC ICU INTERMEDIATE R&B

## 2025-02-09 PROCEDURE — 80048 BASIC METABOLIC PNL TOTAL CA: CPT

## 2025-02-09 PROCEDURE — 36592 COLLECT BLOOD FROM PICC: CPT

## 2025-02-09 RX ADMIN — CETIRIZINE HYDROCHLORIDE 10 MG: 10 TABLET, FILM COATED ORAL at 21:15

## 2025-02-09 RX ADMIN — MIRABEGRON 50 MG: 50 TABLET, FILM COATED, EXTENDED RELEASE ORAL at 08:52

## 2025-02-09 RX ADMIN — ARSENIC TRIOXIDE 13 MG: 1 INJECTION, SOLUTION INTRAVENOUS at 12:31

## 2025-02-09 RX ADMIN — SODIUM CHLORIDE, PRESERVATIVE FREE 10 ML: 5 INJECTION INTRAVENOUS at 21:22

## 2025-02-09 RX ADMIN — DONEPEZIL HYDROCHLORIDE 10 MG: 10 TABLET ORAL at 08:49

## 2025-02-09 RX ADMIN — Medication 6 MG: at 21:16

## 2025-02-09 RX ADMIN — HYDROXYZINE HYDROCHLORIDE 10 MG: 10 TABLET, FILM COATED ORAL at 05:27

## 2025-02-09 RX ADMIN — HYDROXYZINE HYDROCHLORIDE 10 MG: 10 TABLET, FILM COATED ORAL at 15:27

## 2025-02-09 RX ADMIN — SODIUM CHLORIDE, PRESERVATIVE FREE 10 ML: 5 INJECTION INTRAVENOUS at 08:52

## 2025-02-09 RX ADMIN — ENOXAPARIN SODIUM 40 MG: 100 INJECTION SUBCUTANEOUS at 17:50

## 2025-02-09 RX ADMIN — FESOTERODINE FUMARATE 8 MG: 8 TABLET, FILM COATED, EXTENDED RELEASE ORAL at 08:51

## 2025-02-09 RX ADMIN — Medication: at 08:55

## 2025-02-09 RX ADMIN — HYDROCORTISONE: 1 CREAM TOPICAL at 08:54

## 2025-02-09 RX ADMIN — TRETINOIN 50 MG: 10 CAPSULE ORAL at 08:49

## 2025-02-09 RX ADMIN — TRETINOIN 50 MG: 10 CAPSULE ORAL at 17:49

## 2025-02-09 RX ADMIN — QUETIAPINE FUMARATE 100 MG: 25 TABLET ORAL at 21:15

## 2025-02-09 RX ADMIN — SODIUM CHLORIDE, PRESERVATIVE FREE 10 ML: 5 INJECTION INTRAVENOUS at 08:55

## 2025-02-09 RX ADMIN — LEVOTHYROXINE SODIUM 175 MCG: 150 TABLET ORAL at 05:27

## 2025-02-09 ASSESSMENT — PAIN SCALES - GENERAL
PAINLEVEL_OUTOF10: 0

## 2025-02-09 NOTE — PLAN OF CARE
Problem: Safety - Adult  Goal: Free from fall injury  2/9/2025 0535 by Augustina Dunn, RN  Outcome: Progressing  Note: No fall this shift       Problem: Skin/Tissue Integrity - Adult  Goal: Skin integrity remains intact  2/9/2025 0535 by Augustina Dunn, RN  Outcome: Progressing  Note: No new wounds noted this shift  Patient complained of itchy back. Creams applied. Patient given atarax

## 2025-02-09 NOTE — PROGRESS NOTES
[facility] Progress Note    2025     Karan Fortune    MRN: [id number]    : 1950    Referring MD: No referring provider defined for this encounter.    Interval History:  - Tolerating chemotherapy well  - WBC and platelets trending up  - Plan BM asp and biopsy on Monday      ECOG PS:  (2) Ambulatory and capable of self care, unable to carry out work activity, up and about > 50% or waking hours    KPS: 60% Requires occasional assistance, but is able to care for most of his personal needs    Isolation: None    Medications    Scheduled Meds:   Hydrocerin   Topical BID    enoxaparin  40 mg SubCUTAneous QPM    mirabegron  50 mg Oral Daily    Fesoterodine Fumarate ER  8 mg Oral Daily    QUEtiapine  100 mg Oral Nightly    hydrocortisone   Topical BID    arsenic trioxide (TRISENOX) 13 mg in sodium chloride 0.9 % 250 mL IVPB  13 mg IntraVENous Q24H    cetirizine  10 mg Oral Nightly    donepezil  10 mg Oral Daily    sodium chloride flush  5-40 mL IntraVENous 2 times per day    Saline Mouthwash  15 mL Swish & Spit 4x Daily AC & HS    sodium chloride flush  5-40 mL IntraVENous 2 times per day    tretinoin  22.5 mg/m2 Oral Daily with breakfast    And    tretinoin  22.5 mg/m2 Oral Dinner    levothyroxine  175 mcg Oral Daily     Continuous Infusions:   potassium chloride 40 mEq, magnesium sulfate 1,000 mg in sodium chloride 0.9 % 1,000 mL infusion 50 mL/hr at 25 1732    sodium chloride      sodium chloride      sodium chloride      sodium chloride      sodium chloride 5 mL/hr at 25 0559     PRN Meds:.hydrOXYzine HCl, [COMPLETED] loperamide **FOLLOWED BY** loperamide, benzonatate, melatonin, hydrALAZINE, sodium chloride, magnesium sulfate, prochlorperazine **OR** prochlorperazine, potassium chloride, sodium chloride, traMADol, sodium chloride, sodium chloride flush, sodium chloride, ALTEplase (CATHFLO) 2 mg in sterile water 2 mL injection, sodium chloride flush, sodium chloride    ROS:  As noted above,    Hypersomnolence   -Hold Adderall 20 mg PO QD   Muscle rigidity/ spasms   -OK for baclofen    Confusion/ Delirium   -Neurology consulted 1/27/25:  -Delirium can be associated with Valtrex and psychosis can occasionally be associated with Levaquin.   -Increased nocturnal dose of Seroquel to 100mg (1/28/25)  Myodesopsia  - Patient reported \"floater\" in right eye  - Monitor    9. Endocrine   History of Thyroid Cancer s/p thyroidectomy   Post ablative hypothyroidism   -Continue levothyroxine 175 mcg     10. ENT   - Septorhinoplasty in 2023  Allergic rhinitis   -Xyzal not on formulary; started zyrtec     11.    Overactive Bladder  -Continue Trospium   - Add Fesoterodine - home med one daily     12. Integumentary   Pruritus  - Dry skin and pruritus on back  - Continue Eucerin and Hydrocortisone cream    - DVT Prophylaxis: Platelets >50,000 cells/dL, - daily lovenox prophylaxis ordered  Contraindications to pharmacologic prophylaxis: None  Contraindications to mechanical prophylaxis: None    - Disposition: Unknown at this time    The patient was seen and examined by Dr. Lundberg.      Indy Nolan, APRN - CNP

## 2025-02-09 NOTE — PROGRESS NOTES
Lab drawn via purple lumen, brisk blood return noted. Chemotherapy paused momentarily while drawing lab and lumen flushed with 20 mL normal saline. TSH Reflex to FT4 lab sent to lab via tube station.

## 2025-02-09 NOTE — PLAN OF CARE
Problem: Safety - Adult  Goal: Free from fall injury  2/9/2025 1833 by Wendy Sims, RN  Outcome: Progressing  Flowsheets (Taken 2/8/2025 0454 by Jenae Youngblood, RN)  Free From Fall Injury: Instruct family/caregiver on patient safety  Note: + High Fall Risk per DALE/ABCDS: Explained fall risk precautions to patient and family and rationale behind their use to keep the patient safe. Patient's bed is in low position, side rails up 2, call light and belongings are in reach. Bed alarm on. Sitter sitting outside room with window open. Camera in room. Encouraged patient to call for assistance as needed.  2/9/2025 0535 by Augustina Dunn, RN  Outcome: Progressing  Note: No fall this shift       Problem: ABCDS Injury Assessment  Goal: Absence of physical injury  Outcome: Progressing  Flowsheets (Taken 2/8/2025 0454 by Jenae Youngblood, RN)  Absence of Physical Injury: Implement safety measures based on patient assessment     Problem: Neurosensory - Adult  Goal: Achieves stable or improved neurological status  Outcome: Progressing  Flowsheets (Taken 2/1/2025 0058 by Jenae Youngblood, RN)  Achieves stable or improved neurological status: Assess for and report changes in neurological status     Problem: Respiratory - Adult  Goal: Achieves optimal ventilation and oxygenation  Outcome: Progressing  Flowsheets (Taken 2/8/2025 0454 by Jenae Youngblood, RN)  Achieves optimal ventilation and oxygenation:   Assess for changes in respiratory status   Assess for changes in mentation and behavior   Position to facilitate oxygenation and minimize respiratory effort     Problem: Cardiovascular - Adult  Goal: Maintains optimal cardiac output and hemodynamic stability  Outcome: Progressing  Flowsheets (Taken 1/29/2025 0746 by Ernestina Tran, RN)  Maintains optimal cardiac output and hemodynamic stability:   Monitor urine output and notify Licensed Independent Practitioner for values outside of normal range   Monitor blood pressure  and heart rate   Assess for signs of decreased cardiac output   Administer fluid and/or volume expanders as ordered   Administer vasoactive medications as ordered   For PPHN infants, administer sedation as ordered and minimize all controllable stressors.  Goal: Absence of cardiac dysrhythmias or at baseline  Outcome: Progressing  Flowsheets (Taken 1/26/2025 1546)  Absence of cardiac dysrhythmias or at baseline:   Monitor cardiac rate and rhythm   Assess for signs of decreased cardiac output     Problem: Skin/Tissue Integrity - Adult  Goal: Skin integrity remains intact  2/9/2025 1833 by Wendy Sims, RN  Outcome: Progressing  Flowsheets (Taken 2/8/2025 0454 by Jenae Youngblood, RN)  Skin Integrity Remains Intact: Monitor for areas of redness and/or skin breakdown  2/9/2025 0535 by Augustina Dunn RN  Outcome: Progressing  Note: No new wounds noted this shift  Patient complained of itchy back. Creams applied. Patient given atarax     Goal: Oral mucous membranes remain intact  Outcome: Progressing  Flowsheets (Taken 2/4/2025 0210 by Jenae Youngblood, RN)  Oral Mucous Membranes Remain Intact:   Implement preventative oral hygiene regimen   Assess oral mucosa and hygiene practices     Problem: Musculoskeletal - Adult  Goal: Return mobility to safest level of function  Outcome: Progressing  Flowsheets (Taken 2/8/2025 0454 by Jenae Youngblood, RN)  Return Mobility to Safest Level of Function:   Assess patient stability and activity tolerance for standing, transferring and ambulating with or without assistive devices   Assist with transfers and ambulation using safe patient handling equipment as needed   Instruct patient/family in ordered activity level  Goal: Return ADL status to a safe level of function  Outcome: Progressing  Flowsheets (Taken 1/26/2025 4776)  Return ADL Status to a Safe Level of Function:   Administer medication as ordered   Assess activities of daily living deficits and provide assistive devices

## 2025-02-09 NOTE — PROGRESS NOTES
Administration: Chemotherapy drug Arsenic independently verified with Hollie Heaton RN prior to administration.  Acknowledgement of informed consent for chemotherapy administration verified.  Original order, appropriateness of regimen, drug supplied, height, weight, BSA, dose calculations, expiration dates/times, drug appearance, and two patient identifiers were verified by both RNs.  Drug checked for vesicant/irritant status and for risk of hypersensitivity.  Most recent laboratory values and allergies, were reviewed.  Positive, brisk blood return via CVC was confirmed prior to administration. Chest x-ray for correct line placement reviewed. Wendy Sims RN and Hollie Heaton RN verified correct rate of chemotherapy and maintenance IV fluids.  Patient was educated on chemotherapy regimen prior to administration including indication for treatment related to disease & side effects of chemotherapy drug.  Patient verbalizes understanding of all instructions.    Completion of Chemotherapy: Monitoring during infusion done per policy, see Flowsheets.  Blood return verified before, during, and after infusion per policy; no signs of extravasation.  Patient tolerated chemotherapy well and without incident.  Chemotherapy infusion end time on the MAR.

## 2025-02-10 ENCOUNTER — APPOINTMENT (OUTPATIENT)
Dept: GENERAL RADIOLOGY | Age: 75
DRG: 835 | End: 2025-02-10
Payer: MEDICARE

## 2025-02-10 LAB
ALBUMIN SERPL-MCNC: 3.6 G/DL (ref 3.4–5)
ALP SERPL-CCNC: 79 U/L (ref 40–129)
ALT SERPL-CCNC: 12 U/L (ref 10–40)
ANION GAP SERPL CALCULATED.3IONS-SCNC: 13 MMOL/L (ref 3–16)
ANISOCYTOSIS BLD QL SMEAR: ABNORMAL
APTT BLD: 39 SEC (ref 22.1–36.4)
AST SERPL-CCNC: 33 U/L (ref 15–37)
BASOPHILS # BLD: 0 K/UL (ref 0–0.2)
BASOPHILS NFR BLD: 0 %
BILIRUB DIRECT SERPL-MCNC: 0.4 MG/DL (ref 0–0.3)
BILIRUB INDIRECT SERPL-MCNC: 0.1 MG/DL (ref 0–1)
BILIRUB SERPL-MCNC: 0.5 MG/DL (ref 0–1)
BILIRUB UR QL STRIP.AUTO: ABNORMAL
BUN SERPL-MCNC: 8 MG/DL (ref 7–20)
BURR CELLS BLD QL SMEAR: ABNORMAL
CALCIUM SERPL-MCNC: 8.4 MG/DL (ref 8.3–10.6)
CHLORIDE SERPL-SCNC: 108 MMOL/L (ref 99–110)
CLARITY UR: CLEAR
CO2 SERPL-SCNC: 19 MMOL/L (ref 21–32)
COLOR UR: YELLOW
CREAT SERPL-MCNC: 0.6 MG/DL (ref 0.8–1.3)
DEPRECATED RDW RBC AUTO: 19.8 % (ref 12.4–15.4)
EKG ATRIAL RATE: 100 BPM
EKG DIAGNOSIS: NORMAL
EKG P AXIS: 45 DEGREES
EKG P-R INTERVAL: 146 MS
EKG Q-T INTERVAL: 380 MS
EKG QRS DURATION: 72 MS
EKG QTC CALCULATION (BAZETT): 490 MS
EKG R AXIS: 7 DEGREES
EKG T AXIS: -4 DEGREES
EKG VENTRICULAR RATE: 100 BPM
EOSINOPHIL # BLD: 0.2 K/UL (ref 0–0.6)
EOSINOPHIL NFR BLD: 4 %
FIBRINOGEN PPP-MCNC: 246 MG/DL (ref 227–534)
GFR SERPLBLD CREATININE-BSD FMLA CKD-EPI: >90 ML/MIN/{1.73_M2}
GLUCOSE SERPL-MCNC: 103 MG/DL (ref 70–99)
GLUCOSE UR STRIP.AUTO-MCNC: NEGATIVE MG/DL
HCT VFR BLD AUTO: 22.9 % (ref 40.5–52.5)
HGB BLD-MCNC: 7.8 G/DL (ref 13.5–17.5)
HGB UR QL STRIP.AUTO: NEGATIVE
INR PPP: 1.29 (ref 0.85–1.15)
KETONES UR STRIP.AUTO-MCNC: >=80 MG/DL
LDH SERPL L TO P-CCNC: 318 U/L (ref 100–190)
LEUKOCYTE ESTERASE UR QL STRIP.AUTO: NEGATIVE
LYMPHOCYTES # BLD: 0.6 K/UL (ref 1–5.1)
LYMPHOCYTES NFR BLD: 13 %
MACROCYTES BLD QL SMEAR: ABNORMAL
MAGNESIUM SERPL-MCNC: 2 MG/DL (ref 1.8–2.4)
MCH RBC QN AUTO: 34.7 PG (ref 26–34)
MCHC RBC AUTO-ENTMCNC: 34 G/DL (ref 31–36)
MCV RBC AUTO: 102 FL (ref 80–100)
MICROCYTES BLD QL SMEAR: ABNORMAL
MONOCYTES # BLD: 0.1 K/UL (ref 0–1.3)
MONOCYTES NFR BLD: 3 %
NEUTROPHILS # BLD: 3.8 K/UL (ref 1.7–7.7)
NEUTROPHILS NFR BLD: 80 %
NITRITE UR QL STRIP.AUTO: NEGATIVE
NRBC BLD-RTO: 3 /100 WBC
OVALOCYTES BLD QL SMEAR: ABNORMAL
PH UR STRIP.AUTO: 6 [PH] (ref 5–8)
PHOSPHATE SERPL-MCNC: 4.1 MG/DL (ref 2.5–4.9)
PLATELET # BLD AUTO: 202 K/UL (ref 135–450)
PMV BLD AUTO: 9.3 FL (ref 5–10.5)
POLYCHROMASIA BLD QL SMEAR: ABNORMAL
POTASSIUM SERPL-SCNC: 3.9 MMOL/L (ref 3.5–5.1)
PROT SERPL-MCNC: 5.6 G/DL (ref 6.4–8.2)
PROT UR STRIP.AUTO-MCNC: NEGATIVE MG/DL
PROTHROMBIN TIME: 16.3 SEC (ref 11.9–14.9)
RBC # BLD AUTO: 2.24 M/UL (ref 4.2–5.9)
SCHISTOCYTES BLD QL SMEAR: ABNORMAL
SODIUM SERPL-SCNC: 140 MMOL/L (ref 136–145)
SP GR UR STRIP.AUTO: >=1.03 (ref 1–1.03)
UA DIPSTICK W REFLEX MICRO PNL UR: ABNORMAL
URATE SERPL-MCNC: 5.4 MG/DL (ref 3.5–7.2)
URN SPEC COLLECT METH UR: ABNORMAL
UROBILINOGEN UR STRIP-ACNC: 0.2 E.U./DL
WBC # BLD AUTO: 4.8 K/UL (ref 4–11)

## 2025-02-10 PROCEDURE — 87086 URINE CULTURE/COLONY COUNT: CPT

## 2025-02-10 PROCEDURE — 30233N1 TRANSFUSION OF NONAUTOLOGOUS RED BLOOD CELLS INTO PERIPHERAL VEIN, PERCUTANEOUS APPROACH: ICD-10-PCS | Performed by: INTERNAL MEDICINE

## 2025-02-10 PROCEDURE — 71045 X-RAY EXAM CHEST 1 VIEW: CPT

## 2025-02-10 PROCEDURE — 6370000000 HC RX 637 (ALT 250 FOR IP): Performed by: STUDENT IN AN ORGANIZED HEALTH CARE EDUCATION/TRAINING PROGRAM

## 2025-02-10 PROCEDURE — 85610 PROTHROMBIN TIME: CPT

## 2025-02-10 PROCEDURE — 83735 ASSAY OF MAGNESIUM: CPT

## 2025-02-10 PROCEDURE — 85025 COMPLETE CBC W/AUTO DIFF WBC: CPT

## 2025-02-10 PROCEDURE — 81003 URINALYSIS AUTO W/O SCOPE: CPT

## 2025-02-10 PROCEDURE — 80076 HEPATIC FUNCTION PANEL: CPT

## 2025-02-10 PROCEDURE — 2580000003 HC RX 258: Performed by: INTERNAL MEDICINE

## 2025-02-10 PROCEDURE — 36592 COLLECT BLOOD FROM PICC: CPT

## 2025-02-10 PROCEDURE — 85384 FIBRINOGEN ACTIVITY: CPT

## 2025-02-10 PROCEDURE — 88313 SPECIAL STAINS GROUP 2: CPT

## 2025-02-10 PROCEDURE — 6360000002 HC RX W HCPCS: Performed by: INTERNAL MEDICINE

## 2025-02-10 PROCEDURE — 93005 ELECTROCARDIOGRAM TRACING: CPT | Performed by: INTERNAL MEDICINE

## 2025-02-10 PROCEDURE — 88311 DECALCIFY TISSUE: CPT

## 2025-02-10 PROCEDURE — 2500000003 HC RX 250 WO HCPCS

## 2025-02-10 PROCEDURE — 88184 FLOWCYTOMETRY/ TC 1 MARKER: CPT

## 2025-02-10 PROCEDURE — 6370000000 HC RX 637 (ALT 250 FOR IP)

## 2025-02-10 PROCEDURE — 07DR3ZX EXTRACTION OF ILIAC BONE MARROW, PERCUTANEOUS APPROACH, DIAGNOSTIC: ICD-10-PCS | Performed by: INTERNAL MEDICINE

## 2025-02-10 PROCEDURE — 6370000000 HC RX 637 (ALT 250 FOR IP): Performed by: NURSE PRACTITIONER

## 2025-02-10 PROCEDURE — 93010 ELECTROCARDIOGRAM REPORT: CPT | Performed by: INTERNAL MEDICINE

## 2025-02-10 PROCEDURE — 2060000000 HC ICU INTERMEDIATE R&B

## 2025-02-10 PROCEDURE — 80048 BASIC METABOLIC PNL TOTAL CA: CPT

## 2025-02-10 PROCEDURE — 6360000002 HC RX W HCPCS: Performed by: NURSE PRACTITIONER

## 2025-02-10 PROCEDURE — 84100 ASSAY OF PHOSPHORUS: CPT

## 2025-02-10 PROCEDURE — 88305 TISSUE EXAM BY PATHOLOGIST: CPT

## 2025-02-10 PROCEDURE — 83615 LACTATE (LD) (LDH) ENZYME: CPT

## 2025-02-10 PROCEDURE — 84550 ASSAY OF BLOOD/URIC ACID: CPT

## 2025-02-10 PROCEDURE — 85730 THROMBOPLASTIN TIME PARTIAL: CPT

## 2025-02-10 PROCEDURE — 88185 FLOWCYTOMETRY/TC ADD-ON: CPT

## 2025-02-10 RX ADMIN — Medication: at 20:16

## 2025-02-10 RX ADMIN — MIRABEGRON 50 MG: 50 TABLET, FILM COATED, EXTENDED RELEASE ORAL at 08:03

## 2025-02-10 RX ADMIN — SODIUM CHLORIDE, PRESERVATIVE FREE 10 ML: 5 INJECTION INTRAVENOUS at 11:28

## 2025-02-10 RX ADMIN — HYDROCORTISONE: 1 CREAM TOPICAL at 00:22

## 2025-02-10 RX ADMIN — CETIRIZINE HYDROCHLORIDE 10 MG: 10 TABLET, FILM COATED ORAL at 20:13

## 2025-02-10 RX ADMIN — Medication: at 00:22

## 2025-02-10 RX ADMIN — HYDROCORTISONE: 1 CREAM TOPICAL at 20:15

## 2025-02-10 RX ADMIN — QUETIAPINE FUMARATE 100 MG: 25 TABLET ORAL at 20:13

## 2025-02-10 RX ADMIN — HYDROCORTISONE: 1 CREAM TOPICAL at 07:45

## 2025-02-10 RX ADMIN — ARSENIC TRIOXIDE 13 MG: 1 INJECTION, SOLUTION INTRAVENOUS at 12:31

## 2025-02-10 RX ADMIN — ENOXAPARIN SODIUM 40 MG: 100 INJECTION SUBCUTANEOUS at 17:37

## 2025-02-10 RX ADMIN — POTASSIUM CHLORIDE 20 MEQ: 400 INJECTION, SOLUTION INTRAVENOUS at 06:40

## 2025-02-10 RX ADMIN — FESOTERODINE FUMARATE 8 MG: 8 TABLET, FILM COATED, EXTENDED RELEASE ORAL at 08:04

## 2025-02-10 RX ADMIN — LEVOTHYROXINE SODIUM 175 MCG: 150 TABLET ORAL at 06:49

## 2025-02-10 RX ADMIN — Medication: at 07:45

## 2025-02-10 RX ADMIN — TRETINOIN 50 MG: 10 CAPSULE ORAL at 17:37

## 2025-02-10 RX ADMIN — MAGNESIUM SULFATE HEPTAHYDRATE 4000 MG: 40 INJECTION, SOLUTION INTRAVENOUS at 06:43

## 2025-02-10 RX ADMIN — POTASSIUM CHLORIDE 20 MEQ: 400 INJECTION, SOLUTION INTRAVENOUS at 07:42

## 2025-02-10 RX ADMIN — POTASSIUM CHLORIDE 20 MEQ: 400 INJECTION, SOLUTION INTRAVENOUS at 11:25

## 2025-02-10 RX ADMIN — SODIUM CHLORIDE, PRESERVATIVE FREE 10 ML: 5 INJECTION INTRAVENOUS at 20:15

## 2025-02-10 RX ADMIN — POTASSIUM CHLORIDE 20 MEQ: 400 INJECTION, SOLUTION INTRAVENOUS at 09:17

## 2025-02-10 RX ADMIN — SODIUM CHLORIDE, PRESERVATIVE FREE 10 ML: 5 INJECTION INTRAVENOUS at 07:42

## 2025-02-10 RX ADMIN — DONEPEZIL HYDROCHLORIDE 10 MG: 10 TABLET ORAL at 08:03

## 2025-02-10 RX ADMIN — Medication 6 MG: at 20:13

## 2025-02-10 RX ADMIN — TRETINOIN 50 MG: 10 CAPSULE ORAL at 08:03

## 2025-02-10 RX ADMIN — SODIUM CHLORIDE, PRESERVATIVE FREE 10 ML: 5 INJECTION INTRAVENOUS at 20:14

## 2025-02-10 ASSESSMENT — PAIN SCALES - GENERAL
PAINLEVEL_OUTOF10: 0

## 2025-02-10 NOTE — CARE COORDINATION
Atra approved high copay.  Currently working on securing a hugo to help cover the cost. Updated patient and spouse.

## 2025-02-10 NOTE — PROGRESS NOTES
Urine sample collected via clean urinal. Urine samples for urinalysis and urine culture sent to lab via tube station.

## 2025-02-10 NOTE — PROGRESS NOTES
[facility] Progress Note    2/10/2025     Karan Fortune    MRN: [id number]    : 1950    Referring MD: No referring provider defined for this encounter.    Interval History:  - Tolerating chemotherapy well  - WBC and platelets continue to trend up   - BMBx today       ECOG PS:  (2) Ambulatory and capable of self care, unable to carry out work activity, up and about > 50% or waking hours    KPS: 60% Requires occasional assistance, but is able to care for most of his personal needs    Isolation: None    Medications    Scheduled Meds:   Hydrocerin   Topical BID    enoxaparin  40 mg SubCUTAneous QPM    mirabegron  50 mg Oral Daily    Fesoterodine Fumarate ER  8 mg Oral Daily    QUEtiapine  100 mg Oral Nightly    hydrocortisone   Topical BID    arsenic trioxide (TRISENOX) 13 mg in sodium chloride 0.9 % 250 mL IVPB  13 mg IntraVENous Q24H    cetirizine  10 mg Oral Nightly    donepezil  10 mg Oral Daily    sodium chloride flush  5-40 mL IntraVENous 2 times per day    Saline Mouthwash  15 mL Swish & Spit 4x Daily AC & HS    sodium chloride flush  5-40 mL IntraVENous 2 times per day    tretinoin  22.5 mg/m2 Oral Daily with breakfast    And    tretinoin  22.5 mg/m2 Oral Dinner    levothyroxine  175 mcg Oral Daily     Continuous Infusions:   sodium chloride      sodium chloride      sodium chloride      sodium chloride      sodium chloride 5 mL/hr at 25 0559     PRN Meds:.hydrOXYzine HCl, [COMPLETED] loperamide **FOLLOWED BY** loperamide, benzonatate, melatonin, hydrALAZINE, sodium chloride, magnesium sulfate, prochlorperazine **OR** prochlorperazine, potassium chloride, sodium chloride, traMADol, sodium chloride, sodium chloride flush, sodium chloride, ALTEplase (CATHFLO) 2 mg in sterile water 2 mL injection, sodium chloride flush, sodium chloride    ROS:  As noted above, otherwise remainder of 10-point ROS negative    Physical Exam:     I&O:    Intake/Output Summary (Last 24 hours) at 2/10/2025 0851  Last data

## 2025-02-10 NOTE — PROGRESS NOTES
Patient expressed to RN this AM that he saw a cat on his bedside table. RN notified SUNNI Shah about the patient having possible hallucinations.

## 2025-02-10 NOTE — ONCOLOGY
Administration: Chemotherapy drug Arsenic independently verified with Lillie Ortega RN prior to administration.  Acknowledgement of informed consent for chemotherapy administration verified.  Original order, appropriateness of regimen, drug supplied, height, weight, BSA, dose calculations, expiration dates/times, drug appearance, and two patient identifiers were verified by both RNs.  Drug checked for vesicant/irritant status and for risk of hypersensitivity.  Most recent laboratory values and allergies, were reviewed.  Positive, brisk blood return via CVC was confirmed prior to administration. Chest x-ray for correct line placement reviewed. Wendy Sims RN and Lillie Ortega RN verified correct rate of chemotherapy and maintenance IV fluids.  Patient was educated on chemotherapy regimen prior to administration including indication for treatment related to disease & side effects of chemotherapy drug.  Patient and wife verbalizes understanding of all instructions. Arsenic infused through red lumen.    Completion of Chemotherapy: Monitoring during infusion done per policy, see Flowsheets.  Blood return verified before, during, and after infusion per policy; no signs of extravasation.  Patient tolerated chemotherapy well and without incident.  Chemotherapy infusion end time on the MAR.

## 2025-02-10 NOTE — PLAN OF CARE
retention  2/10/2025 0837 by Wendy Sims RN  Outcome: Progressing  Flowsheets (Taken 1/30/2025 1316 by Irina Barrios RN)  Absence of urinary retention:   Assess patient’s ability to void and empty bladder   Monitor intake/output and perform bladder scan as needed   Place urinary catheter per Licensed Independent Practitioner order if needed   Discuss with Licensed Independent Practitioner  medications to alleviate retention as needed   Discuss catheterization for long term situations as appropriate  2/9/2025 2238 by Alize Monahan RN  Outcome: Progressing     Problem: Infection - Adult  Goal: Absence of infection at discharge  2/10/2025 0837 by Wendy Sims RN  Outcome: Progressing  Flowsheets (Taken 2/2/2025 0147 by Jenae Youngblood RN)  Absence of infection at discharge:   Assess and monitor for signs and symptoms of infection   Monitor lab/diagnostic results   Monitor all insertion sites i.e., indwelling lines, tubes and drains  2/9/2025 2238 by Alize Monahan RN  Outcome: Progressing  Goal: Absence of infection during hospitalization  2/10/2025 0837 by Wendy Sims RN  Outcome: Progressing  Flowsheets (Taken 2/5/2025 0013 by Jenae Youngblood, RN)  Absence of infection during hospitalization:   Assess and monitor for signs and symptoms of infection   Monitor lab/diagnostic results   Monitor all insertion sites i.e., indwelling lines, tubes and drains   Instruct and encourage patient and family to use good hand hygiene technique  2/9/2025 2238 by Alize Monahan RN  Outcome: Progressing  Goal: Absence of fever/infection during anticipated neutropenic period  2/10/2025 0837 by Wendy Sims RN  Outcome: Progressing  Flowsheets (Taken 2/3/2025 1004)  Absence of fever/infection during anticipated neutropenic period:   Monitor white blood cell count   Implement neutropenic guidelines  2/9/2025 2238 by Alize Monahan RN  Outcome: Progressing     Problem: Metabolic/Fluid and Electrolytes - Adult  Goal:  Levi, Wendy, RN  Outcome: Progressing  Flowsheets (Taken 1/26/2025 1546)  Care Plan - Patient's Chronic Conditions and Co-Morbidity Symptoms are Monitored and Maintained or Improved: Monitor and assess patient's chronic conditions and comorbid symptoms for stability, deterioration, or improvement  2/9/2025 2238 by Alize Monahan RN  Outcome: Progressing     Problem: Pain  Goal: Verbalizes/displays adequate comfort level or baseline comfort level  2/10/2025 0837 by Wendy Sims RN  Outcome: Progressing  Flowsheets (Taken 1/28/2025 0737 by Kavya Mccurdy, RN)  Verbalizes/displays adequate comfort level or baseline comfort level: Encourage patient to monitor pain and request assistance  Note: Patient did not complain of pain this AM during routine VS.  2/9/2025 2238 by Alize Monahan RN  Outcome: Progressing     Problem: Gastrointestinal - Adult  Goal: Maintains adequate nutritional intake  2/10/2025 0837 by Wendy Sims RN  Outcome: Not Progressing  Flowsheets (Taken 1/26/2025 1546)  Maintains adequate nutritional intake:   Monitor percentage of each meal consumed   Assist with meals as needed  Note: Patient has eaten little over the past two days, has not had anything yet for breakfast but has had around 50 mL of water intake.  2/9/2025 2238 by Alize Monahan RN  Outcome: Progressing     Problem: Nutrition Deficit:  Goal: Optimize nutritional status  2/10/2025 0837 by Wendy Sims RN  Outcome: Not Progressing  Flowsheets (Taken 2/3/2025 1004)  Nutrient intake appropriate for improving, restoring, or maintaining nutritional needs:   Assess nutritional status and recommend course of action   Monitor oral intake, labs, and treatment plans  Note: Patient has eaten little over the past two days, has not had anything yet for breakfast but has had around 50 mL of water intake.  2/9/2025 2238 by Alize Monahan RN  Outcome: Progressing

## 2025-02-10 NOTE — PROGRESS NOTES
PICC dressing was peeling.     Central line dressing changed using sterile technique following hospital policy. PARADISE Jimenez, observed with procedure to ensure proper technique.

## 2025-02-10 NOTE — PROGRESS NOTES
Assessed dressing at bone marrow biopsy site post procedure. Dressing dry, no signs of bleeding noted.

## 2025-02-10 NOTE — PLAN OF CARE
Problem: Safety - Adult  Goal: Free from fall injury  2/9/2025 2238 by Alize Monahan RN  Outcome: Progressing  Pt is a High fall risk.   Explained fall risk precautions to pt and rationale behind their use to keep the patient safe. Belongings are in reach. Pt encouraged to notify staff for any and all assistance. Staff present in tx suite throughout entirety of pts treatment to monitor and protect from falls.  Assistance provided when ambulating to restroom utilizing Stay With Me.     Problem: Respiratory - Adult  Goal: Achieves optimal ventilation and oxygenation  2/9/2025 2238 by Alize Monahan, RN  Outcome: Progressing  Patient tolerated room air this shift      Problem: Infection - Adult  Goal: Absence of infection during hospitalization  2/9/2025 2238 by Alize Monahan RN  Outcome: Progressing  Patient afebrile this shift      Problem: Hematologic - Adult  Goal: Maintains hematologic stability  2/9/2025 2238 by Alize Monahan RN  Outcome: Progressing  Patient's hemoglobin this AM:   Recent Labs     02/10/25  0417   HGB 7.8*     Patient's platelet count this AM:   Recent Labs     02/10/25  0417       Thrombocytopenia Precautions in place.  Patient showing no signs or symptoms of active bleeding.  Transfusion not indicated at this time.  Patient verbalizes understanding of all instructions. Will continue to assess and implement POC. Call light within reach and hourly rounding in place.      Problem: Pain  Goal: Verbalizes/displays adequate comfort level or baseline comfort level  2/9/2025 2238 by Alize Monahan, RN  Outcome: Progressing  Patient had no complaints of pain

## 2025-02-10 NOTE — CARE COORDINATION
Attended MD rounds at bedside and discussed plan of care with MD after rounds completed.     Pt will return home with his wife when and no anticipated SW needs.     SW will follow    Geraldine DE LA O, PARVIN   for [facility] ([facility])  [facility]: [phone]

## 2025-02-10 NOTE — PROCEDURES
PROCEDURE NOTE  Date: 2/10/2025   Name: Karan Fortune  YOB: 1950        Procedure: Bone Marrow Biopsy and Needle Aspirate    Indication: APL day 14 BMBX    Anesthesia:  Lidocaine 1% 10 mL    Patient given risks and benefits of procedure. Consent signed and time out performed.  Patient placed in the right lateral decubitus position. Area cleaned and prepped in a sterile fashion with chloraprep. Sterile drape applied. Lidocaine 1% -10ml administered to the subcutaneous tissue and periosteimum of the left iliac crest. Using sterile technique and using an aspirate needle a bone marrow aspirate was performed. A puncture was then made with the provided scalpel and using a Jamshidi needle a biopsy was taken from the left posterior iliac crest. A sterile bandage was applied. No significant bleeding. Sample sent for histology, flow cytometry, FISH, cytogenetics and molecular studies. Patient tolerated procedure well.     Estimated Blood Loss: 0    Alyssa Alvarado, APRN - CNP

## 2025-02-10 NOTE — PROGRESS NOTES
Occupational Therapy/Physical Therapy  Pt supine in bed upon arrival with wife present. Pt recently had bone marrow biopsy and requesting to rest declining participation in therapy at this time. Pt reported he would like therapy to reattempt tomorrow.     Ruchi Murguia, OTR/GERMAINE Garcia PT  [phone]

## 2025-02-10 NOTE — PROGRESS NOTES
Per Alyssa Alvarado, NP, okay to hang arsenic with the last of PRN IV potassium bag infusing as long as it is okay with pharmacy, around 20 mL left per infusion pump.    Charge RN spoke with RODERICK Gregg, who stated it was okay to hang arsenic with IV potassium running as long as it was infusing through a different lumen.

## 2025-02-11 LAB
ALBUMIN SERPL-MCNC: 3.5 G/DL (ref 3.4–5)
ALP SERPL-CCNC: 79 U/L (ref 40–129)
ALT SERPL-CCNC: 12 U/L (ref 10–40)
ANION GAP SERPL CALCULATED.3IONS-SCNC: 11 MMOL/L (ref 3–16)
ANISOCYTOSIS BLD QL SMEAR: ABNORMAL
AST SERPL-CCNC: 33 U/L (ref 15–37)
BACTERIA UR CULT: NORMAL
BASOPHILS # BLD: 0 K/UL (ref 0–0.2)
BASOPHILS NFR BLD: 0 %
BILIRUB DIRECT SERPL-MCNC: 0.4 MG/DL (ref 0–0.3)
BILIRUB INDIRECT SERPL-MCNC: 0.1 MG/DL (ref 0–1)
BILIRUB SERPL-MCNC: 0.5 MG/DL (ref 0–1)
BUN SERPL-MCNC: 6 MG/DL (ref 7–20)
CALCIUM SERPL-MCNC: 7.9 MG/DL (ref 8.3–10.6)
CHLORIDE SERPL-SCNC: 107 MMOL/L (ref 99–110)
CO2 SERPL-SCNC: 19 MMOL/L (ref 21–32)
CREAT SERPL-MCNC: 0.5 MG/DL (ref 0.8–1.3)
DEPRECATED RDW RBC AUTO: 20.5 % (ref 12.4–15.4)
EOSINOPHIL # BLD: 0 K/UL (ref 0–0.6)
EOSINOPHIL NFR BLD: 0 %
GFR SERPLBLD CREATININE-BSD FMLA CKD-EPI: >90 ML/MIN/{1.73_M2}
GLUCOSE SERPL-MCNC: 105 MG/DL (ref 70–99)
HCT VFR BLD AUTO: 23.2 % (ref 40.5–52.5)
HGB BLD-MCNC: 7.9 G/DL (ref 13.5–17.5)
LYMPHOCYTES # BLD: 0.4 K/UL (ref 1–5.1)
LYMPHOCYTES NFR BLD: 8 %
MAGNESIUM SERPL-MCNC: 2.17 MG/DL (ref 1.8–2.4)
MCH RBC QN AUTO: 35.1 PG (ref 26–34)
MCHC RBC AUTO-ENTMCNC: 34.2 G/DL (ref 31–36)
MCV RBC AUTO: 102.5 FL (ref 80–100)
METAMYELOCYTES NFR BLD MANUAL: 1 %
MONOCYTES # BLD: 0.2 K/UL (ref 0–1.3)
MONOCYTES NFR BLD: 4 %
NEUTROPHILS # BLD: 4.4 K/UL (ref 1.7–7.7)
NEUTROPHILS NFR BLD: 85 %
NEUTS BAND NFR BLD MANUAL: 2 % (ref 0–7)
PHOSPHATE SERPL-MCNC: 3.9 MG/DL (ref 2.5–4.9)
PLATELET # BLD AUTO: 249 K/UL (ref 135–450)
PMV BLD AUTO: 8.8 FL (ref 5–10.5)
POLYCHROMASIA BLD QL SMEAR: ABNORMAL
POTASSIUM SERPL-SCNC: 3.9 MMOL/L (ref 3.5–5.1)
PROT SERPL-MCNC: 5.4 G/DL (ref 6.4–8.2)
RBC # BLD AUTO: 2.26 M/UL (ref 4.2–5.9)
SODIUM SERPL-SCNC: 137 MMOL/L (ref 136–145)
WBC # BLD AUTO: 5 K/UL (ref 4–11)

## 2025-02-11 PROCEDURE — 97530 THERAPEUTIC ACTIVITIES: CPT

## 2025-02-11 PROCEDURE — 6370000000 HC RX 637 (ALT 250 FOR IP)

## 2025-02-11 PROCEDURE — 2500000003 HC RX 250 WO HCPCS

## 2025-02-11 PROCEDURE — 97116 GAIT TRAINING THERAPY: CPT

## 2025-02-11 PROCEDURE — 83735 ASSAY OF MAGNESIUM: CPT

## 2025-02-11 PROCEDURE — 2580000003 HC RX 258: Performed by: INTERNAL MEDICINE

## 2025-02-11 PROCEDURE — 80076 HEPATIC FUNCTION PANEL: CPT

## 2025-02-11 PROCEDURE — 6370000000 HC RX 637 (ALT 250 FOR IP): Performed by: STUDENT IN AN ORGANIZED HEALTH CARE EDUCATION/TRAINING PROGRAM

## 2025-02-11 PROCEDURE — 84100 ASSAY OF PHOSPHORUS: CPT

## 2025-02-11 PROCEDURE — 51798 US URINE CAPACITY MEASURE: CPT

## 2025-02-11 PROCEDURE — 2060000000 HC ICU INTERMEDIATE R&B

## 2025-02-11 PROCEDURE — 85025 COMPLETE CBC W/AUTO DIFF WBC: CPT

## 2025-02-11 PROCEDURE — 80048 BASIC METABOLIC PNL TOTAL CA: CPT

## 2025-02-11 PROCEDURE — 6360000002 HC RX W HCPCS: Performed by: INTERNAL MEDICINE

## 2025-02-11 PROCEDURE — 6370000000 HC RX 637 (ALT 250 FOR IP): Performed by: NURSE PRACTITIONER

## 2025-02-11 PROCEDURE — 36592 COLLECT BLOOD FROM PICC: CPT

## 2025-02-11 RX ADMIN — Medication: at 08:41

## 2025-02-11 RX ADMIN — Medication: at 19:55

## 2025-02-11 RX ADMIN — ENOXAPARIN SODIUM 40 MG: 100 INJECTION SUBCUTANEOUS at 17:34

## 2025-02-11 RX ADMIN — BENZONATATE 100 MG: 100 CAPSULE ORAL at 19:49

## 2025-02-11 RX ADMIN — SODIUM CHLORIDE, PRESERVATIVE FREE 10 ML: 5 INJECTION INTRAVENOUS at 08:42

## 2025-02-11 RX ADMIN — ARSENIC TRIOXIDE 13 MG: 1 INJECTION, SOLUTION INTRAVENOUS at 13:20

## 2025-02-11 RX ADMIN — POTASSIUM CHLORIDE 20 MEQ: 400 INJECTION, SOLUTION INTRAVENOUS at 05:52

## 2025-02-11 RX ADMIN — CETIRIZINE HYDROCHLORIDE 10 MG: 10 TABLET, FILM COATED ORAL at 19:49

## 2025-02-11 RX ADMIN — FESOTERODINE FUMARATE 8 MG: 8 TABLET, FILM COATED, EXTENDED RELEASE ORAL at 08:40

## 2025-02-11 RX ADMIN — POTASSIUM CHLORIDE 20 MEQ: 400 INJECTION, SOLUTION INTRAVENOUS at 08:47

## 2025-02-11 RX ADMIN — MIRABEGRON 50 MG: 50 TABLET, FILM COATED, EXTENDED RELEASE ORAL at 08:40

## 2025-02-11 RX ADMIN — HYDROCORTISONE: 1 CREAM TOPICAL at 08:41

## 2025-02-11 RX ADMIN — TRETINOIN 50 MG: 10 CAPSULE ORAL at 08:43

## 2025-02-11 RX ADMIN — POTASSIUM CHLORIDE 20 MEQ: 400 INJECTION, SOLUTION INTRAVENOUS at 10:37

## 2025-02-11 RX ADMIN — TRETINOIN 50 MG: 10 CAPSULE ORAL at 17:34

## 2025-02-11 RX ADMIN — DONEPEZIL HYDROCHLORIDE 10 MG: 10 TABLET ORAL at 08:41

## 2025-02-11 RX ADMIN — POTASSIUM CHLORIDE 20 MEQ: 400 INJECTION, SOLUTION INTRAVENOUS at 13:15

## 2025-02-11 RX ADMIN — QUETIAPINE FUMARATE 100 MG: 25 TABLET ORAL at 19:49

## 2025-02-11 RX ADMIN — LEVOTHYROXINE SODIUM 175 MCG: 150 TABLET ORAL at 06:19

## 2025-02-11 RX ADMIN — Medication 6 MG: at 19:49

## 2025-02-11 NOTE — PROGRESS NOTES
[facility] Progress Note    2025     Karan Fortune    MRN: [id number]    : 1950    Referring MD: No referring provider defined for this encounter.    Interval History:  - Tolerating chemotherapy well  - WBC and platelets continue to trend up   - BMBx 2/10/25: results pending   -Urinary incontinence 2/10/25: UA unremarkable. Will await urine culture.       ECOG PS:  (2) Ambulatory and capable of self care, unable to carry out work activity, up and about > 50% or waking hours    KPS: 60% Requires occasional assistance, but is able to care for most of his personal needs    Isolation: None    Medications    Scheduled Meds:   Hydrocerin   Topical BID    enoxaparin  40 mg SubCUTAneous QPM    mirabegron  50 mg Oral Daily    Fesoterodine Fumarate ER  8 mg Oral Daily    QUEtiapine  100 mg Oral Nightly    hydrocortisone   Topical BID    arsenic trioxide (TRISENOX) 13 mg in sodium chloride 0.9 % 250 mL IVPB  13 mg IntraVENous Q24H    cetirizine  10 mg Oral Nightly    donepezil  10 mg Oral Daily    sodium chloride flush  5-40 mL IntraVENous 2 times per day    Saline Mouthwash  15 mL Swish & Spit 4x Daily AC & HS    sodium chloride flush  5-40 mL IntraVENous 2 times per day    tretinoin  22.5 mg/m2 Oral Daily with breakfast    And    tretinoin  22.5 mg/m2 Oral Dinner    levothyroxine  175 mcg Oral Daily     Continuous Infusions:   sodium chloride      sodium chloride      sodium chloride      sodium chloride      sodium chloride 5 mL/hr at 25 0559     PRN Meds:.hydrOXYzine HCl, [COMPLETED] loperamide **FOLLOWED BY** loperamide, benzonatate, melatonin, hydrALAZINE, sodium chloride, magnesium sulfate, prochlorperazine **OR** prochlorperazine, potassium chloride, sodium chloride, traMADol, sodium chloride, sodium chloride flush, sodium chloride, ALTEplase (CATHFLO) 2 mg in sterile water 2 mL injection, sodium chloride flush, sodium chloride    ROS:  As noted above, otherwise remainder of 10-point ROS      TREATMENT:            -ATRA (started 1/22/25)  -Arsenic (started 1/22/25)      ASSESSMENT AND PLAN:            1.  Acute Promyelocytic Leukemia (APL)      BMBX 1/17/24 ([facility]):markedly increased immature granulocytes, predominantly at the promyelocytes stage, with an immunophenotype suggestive of acute promyelocytic leukemia (APL).  FISH study for AML panel detected PML/CODIE translocation in 121 / 200 cells,   consistent with APL with PML::RATA fusion.  On the bone marrow aspirate smears, the leukemic cells/abnormal granulocytes account for approximately 75% of total cells.       PLAN:    -ATRA  (started 1/21/25) DAY 22  -Arsenic (started 1/22/25) DAY 21  -BMBx 2/10/25: results pending        2. ID:  No s/s of infection  -Staph epi from urine culture--> likely a contaminate. UA not convincing for infection. No urinary symptoms.  - s/p Valtrex, Levaquin and fluconazole ppx        3. Heme:  Pancytopenia r/t APL- WBC and platelets trending up  - Transfuse for Hgb < 7 and Platelets < 50K (epistaxis and high risk to bleed)   - No transfusion today  - Send Vitamin B12, folate, retic (low), Iron/TIBC (61/172), ferritin (697) 2/5/25  Risk for DIC:  - Transfuse Cryoprecipitate if Fibrinogen is <150     4. Metabolic:    - Replace K+ & Mg per PRN orders (cardiac parameters with Trisenox)     5. GI / Nutrition:    - low microbial diet   - Dietician to follow     6. Cardiac:   -ECHO 9/5/24: LVEF 70%, normal systolic function. Mild mitral regurgitation   -ECHO 1/21/2025:  EF 55-60%  -EKG 1/26/25:  bpm QTc 461ms     -EKG 2/10/25:  bpm QTc 490 ms    PLAN:  -Serial EKGs      7. Pulm:   - Encourage IS & ambulation     8. Neuro:  History of Lewy body dementia diagnosed with positive Lizy scan   -7/21/23 lumbar puncture: AD biomarkers in CSF  -Difficulty with word finding, problems with gait, lacks patience   -Follows with Rafael Jason at    -Continue Aricept  -Continue Seroquel 100 mg PO QHS

## 2025-02-11 NOTE — PLAN OF CARE
Problem: Safety - Adult  Goal: Free from fall injury  Outcome: Progressing  Pt up with assist x1, call light within reach, bed alarm in use     Problem: Genitourinary - Adult  Goal: Absence of urinary retention  Outcome: Not Progressing  Pt remains incontinent this shift. Unable to urinate when asked. Pt will be bladder scanned this shift if unable to urinate by 6 PM.    Problem: Respiratory - Adult  Goal: Achieves optimal ventilation and oxygenation  Outcome: Progressing  Pt remains on room air this shift.     Problem: Cardiovascular - Adult  Goal: Maintains optimal cardiac output and hemodynamic stability  Outcome: Progressing  Pt remains on tele, sinus tach this shift     Problem: Skin/Tissue Integrity - Adult  Goal: Skin integrity remains intact  Outcome: Progressing  No new skin breakdown this shift.    Problem: Musculoskeletal - Adult  Goal: Return mobility to safest level of function  Outcome: Progressing  Pt up out of bed for entire shift. Pt ambulated in hallway x2 with PCA.     Problem: Gastrointestinal - Adult  Goal: Minimal or absence of nausea and vomiting  Outcome: Progressing  No complaints of nausea or vomiting this shift.     Problem: Infection - Adult  Goal: Absence of infection at discharge  Outcome: Progressing  Pt remained afebrile this shift.     Problem: Metabolic/Fluid and Electrolytes - Adult  Goal: Electrolytes maintained within normal limits  Outcome: Progressing  Pt received potassium this shift.     Problem: Hematologic - Adult  Goal: Maintains hematologic stability  Outcome: Progressing  Patient's hemoglobin this AM:   Recent Labs     02/11/25  0357   HGB 7.9*     Patient's platelet count this AM:   Recent Labs     02/11/25  0357       Thrombocytopenia Precautions in place.  Patient showing no signs or symptoms of active bleeding.  Transfusion not indicated at this time.  Patient verbalizes understanding of all instructions. Will continue to assess and implement POC. Call light

## 2025-02-11 NOTE — ONCOLOGY
Administration: Chemotherapy drug Arsenic independently verified with Lillie Ortega RN prior to administration.  Acknowledgement of informed consent for chemotherapy administration verified.  Original order, appropriateness of regimen, drug supplied, height, weight, BSA, dose calculations, expiration dates/times, drug appearance, and two patient identifiers were verified by both RNs.  Drug checked for vesicant/irritant status and for risk of hypersensitivity.  Most recent laboratory values and allergies, were reviewed.  Positive, brisk blood return via CVC was confirmed prior to administration. Chest x-ray for correct line placement reviewed. Dorothea Hoang RN and Lillie Ortega RN verified correct rate of chemotherapy and maintenance IV fluids.  Patient was educated on chemotherapy regimen prior to administration including indication for treatment related to disease & side effects of chemotherapy drug.  Patient and wife verbalizes understanding of all instructions. Arsenic infused through red lumen.     Completion of Chemotherapy: Monitoring during infusion done per policy, see Flowsheets.  Blood return verified before, during, and after infusion per policy; no signs of extravasation.  Patient tolerated chemotherapy well and without incident.  Chemotherapy infusion end time on the MAR.

## 2025-02-11 NOTE — CARE COORDINATION
Spoke with Meliza GHOTRA and Dr. Stone earlier regarding resources for pt and wife.     Spoke with wife while ambulating in the halls. Discussed resources for home and she was open to a list of private pay home care. Aware insurance won't pay for this and she said she figured.     Returned to room and pt's wife was not present; pt was working with another provider.     PARVIN Aguirre   for [facility] ([facility])  [employer] Mobile: [phone]    Addendum at 2:43pm: Talked with PT about recommendations for home therapy.     Spoke with pt's wife at bedside. Provided her with the private pay home care list and she is aware that she will need to call to arrange, along with some of them have requirments for the amount of hours they do per week.     Discussed recommendation for home PT and she declined at this time stating she wants to see how he does once home and that his walking is okay.

## 2025-02-11 NOTE — PROGRESS NOTES
Physical Therapy  Daily Treatment Note  Discharge Recommendation:  24 hour supervision/assist and home PT  Equipment Needs:  No new needs anticipated    Assessment:  Pt needing SBA to CGA for safe mobility at this time. Pt needing frequent cues to stay on task and occasionally for safety (due to IV lines.) Occasional side-step with ambulation, but no overt LOB noted this session. Pt from home with wife. Would benefit from 24 hour supervision/assist and continued home PT. No DME needs anticipated.     HOME HEALTH CARE: LEVEL 1 STANDARD  - Initial home health evaluation to occur within 24-48 hours, in patient home   - Therapy to evaluate with goal of regaining prior level of functioning   - Therapy to evaluate if patient has Home Health Aide needs for personal care    Chart Reviewed: Yes         Additional Pertinent Hx: Pt is a 74 y.o. male adm 1/21 with APL.  Pt presented to the emergency department, at the behest of an oncologist in the [facility] system, to be admitted to the [facility] for initiation of therapy for new diagnosis of acute leukemia. He was referred to [facility] hematology department by his PCP for thrombocytopenia and petechiae. He underwent a BMBx on 1/17/25. Dr. De La Cruz called FISH results to Dr. Anne Lundberg, which reportedly show APL. He is admitted for further work-up, treatment and supportive care.     CXR: neg.    PMH: thyroid cancer, followed at , in remission, Lewy body dementia, hypersomnolence, allergic rhinitis and BPH.      Diagnosis: APL   Treatment Diagnosis: Decreased functional mobility    Subjective: Pt in chair initially. Wife and staff present.   Pt agreeable to working with PT.   Confusion noted at times.     Pain: Denies    Objective:    Transfers  Sit to stand: SBA from chair (twice)  Stand to sit: SBA into chair (twice)    Ambulation  Assistance Level: CGA  Assistive device: None  Distance: 20 ft in room (to bathroom), 2 laps in parnell (~600 ft)  Quality of  gait: Decreased step height/length; \"toe-out\" B feet; decreased pace; occasional side-step, but no overt LOB  Other: Pt wearing shoes with ambulation.   Other: Often needing repeated cues to stay on task and for direction. Stopping occasionally. Easily distracted.    Balance  Static stance SBA to CGA (stood at commode to urinate 1-2 minutes)  Ambulation without UE support CGA    Exercises  No leg exercises this session due to increased HR after ambulation.    Other  HR up to 117 bpm after ambulation. RN updated.     Patient Education  Role of PT. Importance of mobility. Calling for assist with needs. Expressed understanding.     Safety Devices  Pt left with needs in reach. In chair (reclined) with chair alarm on. RN updated. Family and staff present.     AM-PAC score  AM-PAC Inpatient Mobility Raw Score : 18  AM-PAC Inpatient T-Scale Score : 43.63  Mobility Inpatient CMS 0-100% Score: 46.58  Mobility Inpatient CMS G-Code Modifier : CK    Goals: (as determined and assessed by primary PT)  Time Frame for Short Term Goals: By discharge - all goals ongoing  Short Term Goal 1: Sit to stand independent.  Ongoing   Short Term Goal 2: Pt will amb >300' with LRAD independent without LOB.  Ongoing              Plan:  Plan: 2-5x/week  Current Treatment Recommendations: Balance training, Functional mobility training, Endurance training, Gait training, Safety education & training, Patient/Caregiver education & training    Therapy Time    Individual  Concurrent  Group  Co-treatment    Time In  1335            Time Out  1414            Minutes  39              Timed Code Treatment Minutes: 39  Total Treatment Minutes: 39    Will continue per plan of care.   If patient is discharged prior to next treatment, this note will serve as the discharge summary.    Yara Oleary, PTA #[id number]

## 2025-02-11 NOTE — PLAN OF CARE
Problem: Safety - Adult  Goal: Free from fall injury  2/10/2025 2209 by Alize Monahan RN  Outcome: Progressing  Pt is a High fall risk.   Explained fall risk precautions to pt and rationale behind their use to keep the patient safe. Belongings are in reach. Pt encouraged to notify staff for any and all assistance. Staff present in tx suite throughout entirety of pts treatment to monitor and protect from falls.  Assistance provided when ambulating to restroom utilizing Stay With Me.    Problem: Respiratory - Adult  Goal: Achieves optimal ventilation and oxygenation  2/10/2025 2209 by Alize Monahan, RN  Outcome: Progressing  Patient tolerated RA this shift      Problem: Gastrointestinal - Adult  Goal: Minimal or absence of nausea and vomiting  2/10/2025 2209 by Alize Monahan RN  Outcome: Progressing  Patient had no complaints of nausea or vomiting this shift     Problem: Infection - Adult  Goal: Absence of infection during hospitalization  2/10/2025 2209 by Alize Monahan RN  Outcome: Progressing  Patient afebrile this shift      Problem: Hematologic - Adult  Goal: Maintains hematologic stability  2/10/2025 2209 by Alize Monahan RN  Outcome: Progressing  Patient's hemoglobin this AM:   Recent Labs     02/11/25  0357   HGB 7.9*     Patient's platelet count this AM:   Recent Labs     02/11/25  0357       Thrombocytopenia not present at this time.  Patient showing no signs or symptoms of active bleeding.  Transfusion not indicated at this time.  Patient verbalizes understanding of all instructions. Will continue to assess and implement POC. Call light within reach and hourly rounding in place.      Problem: Pain  Goal: Verbalizes/displays adequate comfort level or baseline comfort level  2/10/2025 2209 by Alize Monahan, RN  Outcome: Progressing  Patient had no complaints of pain at this time.

## 2025-02-12 LAB
ALBUMIN SERPL-MCNC: 3.6 G/DL (ref 3.4–5)
ALP SERPL-CCNC: 83 U/L (ref 40–129)
ALT SERPL-CCNC: 12 U/L (ref 10–40)
ANION GAP SERPL CALCULATED.3IONS-SCNC: 15 MMOL/L (ref 3–16)
APTT BLD: 38.2 SEC (ref 22.1–36.4)
AST SERPL-CCNC: 35 U/L (ref 15–37)
BASOPHILS # BLD: 0 K/UL (ref 0–0.2)
BASOPHILS NFR BLD: 0.4 %
BILIRUB DIRECT SERPL-MCNC: 0.3 MG/DL (ref 0–0.3)
BILIRUB INDIRECT SERPL-MCNC: 0.2 MG/DL (ref 0–1)
BILIRUB SERPL-MCNC: 0.5 MG/DL (ref 0–1)
BUN SERPL-MCNC: 7 MG/DL (ref 7–20)
CALCIUM SERPL-MCNC: 8.4 MG/DL (ref 8.3–10.6)
CHLORIDE SERPL-SCNC: 107 MMOL/L (ref 99–110)
CO2 SERPL-SCNC: 17 MMOL/L (ref 21–32)
CREAT SERPL-MCNC: 0.5 MG/DL (ref 0.8–1.3)
DEPRECATED RDW RBC AUTO: 20.8 % (ref 12.4–15.4)
EOSINOPHIL # BLD: 0.1 K/UL (ref 0–0.6)
EOSINOPHIL NFR BLD: 2.3 %
FIBRINOGEN PPP-MCNC: 253 MG/DL (ref 227–534)
GFR SERPLBLD CREATININE-BSD FMLA CKD-EPI: >90 ML/MIN/{1.73_M2}
GLUCOSE SERPL-MCNC: 107 MG/DL (ref 70–99)
HCT VFR BLD AUTO: 23.6 % (ref 40.5–52.5)
HGB BLD-MCNC: 8 G/DL (ref 13.5–17.5)
INR PPP: 1.25 (ref 0.85–1.15)
LDH SERPL L TO P-CCNC: 293 U/L (ref 100–190)
LYMPHOCYTES # BLD: 0.7 K/UL (ref 1–5.1)
LYMPHOCYTES NFR BLD: 15.4 %
MAGNESIUM SERPL-MCNC: 2.05 MG/DL (ref 1.8–2.4)
MCH RBC QN AUTO: 35.2 PG (ref 26–34)
MCHC RBC AUTO-ENTMCNC: 33.8 G/DL (ref 31–36)
MCV RBC AUTO: 104.1 FL (ref 80–100)
MONOCYTES # BLD: 0.4 K/UL (ref 0–1.3)
MONOCYTES NFR BLD: 8.1 %
NEUTROPHILS # BLD: 3.4 K/UL (ref 1.7–7.7)
NEUTROPHILS NFR BLD: 73.8 %
PHOSPHATE SERPL-MCNC: 4.4 MG/DL (ref 2.5–4.9)
PLATELET # BLD AUTO: 304 K/UL (ref 135–450)
PMV BLD AUTO: 8.9 FL (ref 5–10.5)
POTASSIUM SERPL-SCNC: 4 MMOL/L (ref 3.5–5.1)
PROT SERPL-MCNC: 5.6 G/DL (ref 6.4–8.2)
PROTHROMBIN TIME: 15.9 SEC (ref 11.9–14.9)
RBC # BLD AUTO: 2.27 M/UL (ref 4.2–5.9)
SODIUM SERPL-SCNC: 139 MMOL/L (ref 136–145)
URATE SERPL-MCNC: 6.3 MG/DL (ref 3.5–7.2)
WBC # BLD AUTO: 4.7 K/UL (ref 4–11)

## 2025-02-12 PROCEDURE — 80048 BASIC METABOLIC PNL TOTAL CA: CPT

## 2025-02-12 PROCEDURE — 6370000000 HC RX 637 (ALT 250 FOR IP): Performed by: NURSE PRACTITIONER

## 2025-02-12 PROCEDURE — 85730 THROMBOPLASTIN TIME PARTIAL: CPT

## 2025-02-12 PROCEDURE — 85025 COMPLETE CBC W/AUTO DIFF WBC: CPT

## 2025-02-12 PROCEDURE — 84100 ASSAY OF PHOSPHORUS: CPT

## 2025-02-12 PROCEDURE — 83615 LACTATE (LD) (LDH) ENZYME: CPT

## 2025-02-12 PROCEDURE — 84550 ASSAY OF BLOOD/URIC ACID: CPT

## 2025-02-12 PROCEDURE — 6370000000 HC RX 637 (ALT 250 FOR IP)

## 2025-02-12 PROCEDURE — 83735 ASSAY OF MAGNESIUM: CPT

## 2025-02-12 PROCEDURE — 6370000000 HC RX 637 (ALT 250 FOR IP): Performed by: INTERNAL MEDICINE

## 2025-02-12 PROCEDURE — 97530 THERAPEUTIC ACTIVITIES: CPT

## 2025-02-12 PROCEDURE — 2500000003 HC RX 250 WO HCPCS

## 2025-02-12 PROCEDURE — 85610 PROTHROMBIN TIME: CPT

## 2025-02-12 PROCEDURE — 6360000002 HC RX W HCPCS: Performed by: INTERNAL MEDICINE

## 2025-02-12 PROCEDURE — 6370000000 HC RX 637 (ALT 250 FOR IP): Performed by: STUDENT IN AN ORGANIZED HEALTH CARE EDUCATION/TRAINING PROGRAM

## 2025-02-12 PROCEDURE — 2060000000 HC ICU INTERMEDIATE R&B

## 2025-02-12 PROCEDURE — 97116 GAIT TRAINING THERAPY: CPT

## 2025-02-12 PROCEDURE — 2580000003 HC RX 258: Performed by: INTERNAL MEDICINE

## 2025-02-12 PROCEDURE — 80076 HEPATIC FUNCTION PANEL: CPT

## 2025-02-12 PROCEDURE — 85384 FIBRINOGEN ACTIVITY: CPT

## 2025-02-12 RX ADMIN — FESOTERODINE FUMARATE 8 MG: 8 TABLET, FILM COATED, EXTENDED RELEASE ORAL at 08:30

## 2025-02-12 RX ADMIN — ENOXAPARIN SODIUM 40 MG: 100 INJECTION SUBCUTANEOUS at 17:28

## 2025-02-12 RX ADMIN — SODIUM CHLORIDE, PRESERVATIVE FREE 10 ML: 5 INJECTION INTRAVENOUS at 22:55

## 2025-02-12 RX ADMIN — DONEPEZIL HYDROCHLORIDE 10 MG: 10 TABLET ORAL at 08:33

## 2025-02-12 RX ADMIN — TRETINOIN 50 MG: 10 CAPSULE ORAL at 08:52

## 2025-02-12 RX ADMIN — POTASSIUM CHLORIDE 20 MEQ: 400 INJECTION, SOLUTION INTRAVENOUS at 11:22

## 2025-02-12 RX ADMIN — ARSENIC TRIOXIDE 13 MG: 1 INJECTION, SOLUTION INTRAVENOUS at 13:05

## 2025-02-12 RX ADMIN — LEVOTHYROXINE SODIUM 175 MCG: 150 TABLET ORAL at 07:20

## 2025-02-12 RX ADMIN — MIRABEGRON 50 MG: 50 TABLET, FILM COATED, EXTENDED RELEASE ORAL at 08:30

## 2025-02-12 RX ADMIN — SODIUM CHLORIDE, PRESERVATIVE FREE 10 ML: 5 INJECTION INTRAVENOUS at 20:40

## 2025-02-12 RX ADMIN — Medication: at 20:37

## 2025-02-12 RX ADMIN — CETIRIZINE HYDROCHLORIDE 10 MG: 10 TABLET, FILM COATED ORAL at 20:31

## 2025-02-12 RX ADMIN — HYDROCORTISONE: 1 CREAM TOPICAL at 08:30

## 2025-02-12 RX ADMIN — HYDROCORTISONE: 1 CREAM TOPICAL at 20:37

## 2025-02-12 RX ADMIN — POTASSIUM CHLORIDE 20 MEQ: 400 INJECTION, SOLUTION INTRAVENOUS at 06:00

## 2025-02-12 RX ADMIN — POTASSIUM CHLORIDE 20 MEQ: 400 INJECTION, SOLUTION INTRAVENOUS at 04:12

## 2025-02-12 RX ADMIN — BENZONATATE 100 MG: 100 CAPSULE ORAL at 15:21

## 2025-02-12 RX ADMIN — Medication: at 08:30

## 2025-02-12 RX ADMIN — HYDROXYZINE HYDROCHLORIDE 10 MG: 10 TABLET, FILM COATED ORAL at 08:31

## 2025-02-12 RX ADMIN — POTASSIUM CHLORIDE 20 MEQ: 400 INJECTION, SOLUTION INTRAVENOUS at 08:27

## 2025-02-12 RX ADMIN — Medication 6 MG: at 20:31

## 2025-02-12 RX ADMIN — QUETIAPINE FUMARATE 100 MG: 25 TABLET ORAL at 20:31

## 2025-02-12 RX ADMIN — SODIUM CHLORIDE, PRESERVATIVE FREE 10 ML: 5 INJECTION INTRAVENOUS at 08:30

## 2025-02-12 RX ADMIN — TRETINOIN 50 MG: 10 CAPSULE ORAL at 17:28

## 2025-02-12 RX ADMIN — HYDROXYZINE HYDROCHLORIDE 10 MG: 10 TABLET, FILM COATED ORAL at 04:16

## 2025-02-12 ASSESSMENT — PAIN SCALES - GENERAL
PAINLEVEL_OUTOF10: 0
PAINLEVEL_OUTOF10: 0

## 2025-02-12 NOTE — ONCOLOGY
Administration: Chemotherapy drug Arsenic independently verified with Gerald Bolanos RN prior to administration.  Acknowledgement of informed consent for chemotherapy administration verified.  Original order, appropriateness of regimen, drug supplied, height, weight, BSA, dose calculations, expiration dates/times, drug appearance, and two patient identifiers were verified by both RNs.  Drug checked for vesicant/irritant status and for risk of hypersensitivity.  Most recent laboratory values and allergies, were reviewed.  Positive, brisk blood return via CVC was confirmed prior to administration. Chest x-ray for correct line placement reviewed. Dorothea Hoang RN and Gerald Bolanos RN verified correct rate of chemotherapy and maintenance IV fluids.  Patient was educated on chemotherapy regimen prior to administration including indication for treatment related to disease & side effects of chemotherapy drug.  Patient and wife verbalizes understanding of all instructions. Arsenic infused through red lumen.     Completion of Chemotherapy: Monitoring during infusion done per policy, see Flowsheets.  Blood return verified before, during, and after infusion per policy; no signs of extravasation.  Patient tolerated chemotherapy well and without incident.  Chemotherapy infusion end time on the MAR.

## 2025-02-12 NOTE — CARE COORDINATION
Discussed plan of care with treatment team during rounds. Provider aware wife was given resources for private pay agencies and declined skilled home care. Provider stated no barriers with pt not having home PT and also discussed with wife. Pt and wife aware of the anticipated discharge tomorrow and are in agreement with this plan.     Spoke with pt's wife Kayley at bedside after provider left the room. She said she called some private pay agencies and they all had a requirement of hours per week. Discussed referral to [employer] as they don't have a minimal hour requirement and she stated appreciation. She requested writer have their liaison or office call her. She denied additional needs from writer at this time. She was given the IMM.     Referral called to Samuel at [employer] ([phone]) who requested wife's contact information sent to him via secure email at [email or web address]. He is aware of the anticipated discharge tomorrow. He said that they usually need 48 hours to arrange services due to needing to complete an assessment, etc and confirmed that they don't have a minimal hours per week. He said he will contact pt's wife. Wife's contact sent via secure email.     SW will follow    PARVIN Aguirre   for [facility] ([facility])  [employer] Mobile: [phone]    Addendum at 2:00pm: Received this from Samuel at [employer] via email \"I have talked to Kayley and we will be scheduling an assessment early next week!\"

## 2025-02-12 NOTE — CARE COORDINATION
Reviewed discharge plan with patient wife provided handout on outpatient care and follow up .     Patient will be discharged Thursday 3/13.

## 2025-02-12 NOTE — DISCHARGE SUMMARY
[facility] Discharge Summary             Attending Physician: Vesna Lundberg DO    Referring MD: No referring provider defined for this encounter.    Name: Karan Fortune :  1950  MRN:  [id number]    Admission: 2025   Discharge:   25    Date: 2025    Diagnosis on admit: Concern for APL    Procedures: Routine chest x-ray, laboratories, EKG, IV fluid hydration, Panculture for fevers, IV antimicrobial therapy, Respiratory therapy, Oxygen therapy, Blood Product Infusions    Consultations:     Medications:      Medication List        START taking these medications      tretinoin 10 MG chemo capsule  Commonly known as: VESANOID  Take 5 capsules by mouth 2 times daily            ASK your doctor about these medications      ADDERALL (30MG) 30 MG tablet  Generic drug: amphetamine-dextroamphetamine     amoxicillin-clavulanate 250-125 MG per tablet  Commonly known as: AUGMENTIN  Ask about: Should I take this medication?     clindamycin 300 MG capsule  Commonly known as: CLEOCIN     donepezil 10 MG tablet  Commonly known as: ARICEPT     doxycycline hyclate 100 MG capsule  Commonly known as: VIBRAMYCIN     Fesoterodine Fumarate ER 8 MG Tb24     fish oil 1000 MG capsule     fluorometholone 0.1 % ophthalmic suspension  Commonly known as: FML     Ginkgo Biloba 40 MG Tabs     levocetirizine 5 MG tablet  Commonly known as: XYZAL     MUCINEX PO     MULTIVITAMIN & MINERAL PO     Myrbetriq 50 MG Tb24  Generic drug: mirabegron     QUEtiapine 50 MG tablet  Commonly known as: SEROQUEL     rosuvastatin 5 MG tablet  Commonly known as: CRESTOR     Synthroid 300 MCG tablet  Generic drug: levothyroxine     traMADol 50 MG tablet  Commonly known as: ULTRAM     vitamin E 400 UNIT capsule               Where to Get Your Medications        These medications were sent to [facility] - [place], [place] - [address] - P [phone] - F [phone], [facility] [address]      Phone:  - XII grossly intact  CATHETER: PICC    Discharge Laboratory Data:  CBC:   Recent Labs     02/10/25  0417 02/11/25  0357 02/12/25  0220   WBC 4.8 5.0 4.7   HGB 7.8* 7.9* 8.0*   HCT 22.9* 23.2* 23.6*   .0* 102.5* 104.1*    249 304     BMP/Mag:  Recent Labs     02/10/25  0417 02/11/25  0357 02/12/25  0220    137 139   K 3.9 3.9 4.0    107 107   CO2 19* 19* 17*   PHOS 4.1 3.9 4.4   BUN 8 6* 7   CREATININE 0.6* 0.5* 0.5*   MG 2.00 2.17 2.05     LIVP:   Recent Labs     02/10/25  0417 02/11/25  0357 02/12/25  0220   AST 33 33 35   ALT 12 12 12   BILIDIR 0.4* 0.4* 0.3   BILITOT 0.5 0.5 0.5   ALKPHOS 79 79 83     Coags:   Recent Labs     02/10/25  0417 02/12/25  0220   PROTIME 16.3* 15.9*   INR 1.29* 1.25*   APTT 39.0* 38.2*     Uric Acid No results for input(s): \"LABURIC\" in the last 72 hours.  Tacro:  No results for input(s): \"TACROLEV\" in the last 72 hours.  CMV Quant DNA by PCR: No results found for: \"CMVDNAQNT\"  IgG: No results for input(s): \"IGG\" in the last 72 hours.    PROBLEM LIST:            APL (dx 1/2025)   Post ablative hypothyroidism   Thyroid cancer s/p thyroidectomy   Lewy body dementia   Sensorineural hearing loss bilaterally   Cervical DJD   Hypersomnolence      TREATMENT:            -ATRA (started 1/21/25)  -Arsenic (started 1/22/25)      ASSESSMENT AND PLAN:            1.  Acute Promyelocytic Leukemia (APL)      BMBX 1/17/24 ([facility]):markedly increased immature granulocytes, predominantly at the promyelocytes stage, with an immunophenotype suggestive of acute promyelocytic leukemia (APL).  FISH study for AML panel detected PML/CODIE translocation in 121 / 200 cells,   consistent with APL with PML::RATA fusion.  On the bone marrow aspirate smears, the leukemic cells/abnormal granulocytes account for approximately 75% of total cells.       PLAN:    -ATRA  (started 1/21/25) DAY 24  -Arsenic (started 1/22/25) DAY 23  -BMBx 2/10/25: No definitive evidence of residual

## 2025-02-12 NOTE — PLAN OF CARE
Problem: Genitourinary - Adult  Goal: Absence of urinary retention  Outcome: Not Progressing  Pt continues to be incontinent this shift. Pt has soaked 3 briefs this shift.    Problem: Safety - Adult  Goal: Free from fall injury  Outcome: Progressing  Pt up with assist x1, call light within reach, bed alarm in use     Problem: Cardiovascular - Adult  Goal: Maintains optimal cardiac output and hemodynamic stability  Outcome: Progressing  Pt remains on tele this shift. Sinus tach on monitor     Problem: Skin/Tissue Integrity - Adult  Goal: Skin integrity remains intact  Outcome: Progressing  No new skin breakdown at this time.     Problem: Musculoskeletal - Adult  Goal: Return mobility to safest level of function  Outcome: Progressing  Pt up with assist x1, call light within reach, bed alarm in use     Problem: Gastrointestinal - Adult  Goal: Minimal or absence of nausea and vomiting  Outcome: Progressing  No complaints of nausea or vomiting this shift.    Problem: Infection - Adult  Goal: Absence of infection during hospitalization  Outcome: Progressing  Pt remains afebrile this shift.     Problem: Metabolic/Fluid and Electrolytes - Adult  Goal: Electrolytes maintained within normal limits  Outcome: Progressing  Pt received K this shift prior to chemo transfusion.     Problem: Hematologic - Adult  Goal: Maintains hematologic stability  Outcome: Progressing   Patient's hemoglobin this AM:   Recent Labs     02/12/25  0220   HGB 8.0*     Patient's platelet count this AM:   Recent Labs     02/12/25  0220       Thrombocytopenia not present at this time.  Patient showing no signs or symptoms of active bleeding.  Transfusion not indicated at this time.  Patient verbalizes understanding of all instructions. Will continue to assess and implement POC. Call light within reach and hourly rounding in place.     Problem: Pain  Goal: Verbalizes/displays adequate comfort level or baseline comfort level  Outcome:

## 2025-02-12 NOTE — PROGRESS NOTES
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 A diagnosis of Leukemia not having achieved remission, unspecified leukemia type (HCC) was also pertinent to this visit.  Past Medical History:  has a past medical history of Thyroid cancer (HCC).  Past Surgical History:  has a past surgical history that includes Total Thyroidectomy (); Hemorrhoid surgery (); Shoulder arthroscopy (); Colonoscopy (); Throat surgery; hernia repair; and Shoulder arthroscopy (10).    Assessment  Assessment: Pt continues to be limited by cogntion at this time, but performs transfers and gait with SBA.  Plan  is for DC home with family with initial 24hr A and HHPT to maximize mobility and safety.  Activity Tolerance  Activity Tolerance: Patient tolerated treatment well    Plan  Physical Therapy Plan  General Plan: 5-7 times per week  Current Treatment Recommendations: Strengthening, ROM, Functional mobility training, Gait training, Transfer training, Safety education & training, Patient/Caregiver education & training, Equipment evaluation, education, & procurement, Stair training  Safety Devices  Type of Devices: Call light within reach, Nurse notified, Chair alarm in place, Left in chair, Sitter present    Restrictions  Restrictions/Precautions  Activity Level: Up as Tolerated     Subjective  Subjective  Subjective: Pt reclined upon PT entry, agreeable to working with PT.  Wife and sitter present.         Social/Functional History  Social/Functional History  Lives With: Spouse  Type of Home: Apartment (3rd floor apartment)  Home Layout: One  level  Home Access: Level entry (long hallway to get to apartment)  Bathroom Shower/Tub: Walk-in shower  Bathroom Toilet: Standard (nothing next to toilet)  Bathroom Equipment: Grab bars in shower  Home Equipment: None  Has the patient had two or more falls in the past year or any fall with injury in the past year?:  (Fell in September - missed step in parking garage.)  Prior Level of Assist for ADLs: Needs assistance (able to shower independently, assist for dressing)  Prior Level of Assist for Homemaking:  (wife performs)  Prior Level of Assist for Ambulation: Independent community ambulator, with or without device  Prior Level of Assist for Transfers: Independent  Active : No  Leisure & Hobbies: watches tv, spending time with cats  Additional Comments: Wife with pt all the time.  2 sons and grandkids in town.  Vision/Hearing       Cognition   Cognition  Overall Cognitive Status: Exceptions  Arousal/Alertness: Appears intact  Following Commands: Follows one step commands with increased time;Follows one step commands with repetition  Attention Span: Attends with cues to redirect  Memory: Impaired  Safety Judgement: Decreased awareness of need for assistance;Decreased awareness of need for safety  Problem Solving: Assistance required to generate solutions  Insights: Decreased awareness of deficits  Initiation: Requires cues for some  Sequencing: Requires cues for some  Cognition Comment: baseline dementia    Objective  Temp: 97.7 °F (36.5 °C)  Pulse: (!) 120  Heart Rate Source: Telemetry  Respirations: 25  SpO2: 95 %  O2 Device: None (Room air)  BP: (!) 145/76  MAP (Calculated): 99  BP Location: Left upper arm  BP Method: Automatic  Patient Position: Up in chair         Bed mobility  Bed Mobility Comments: Not assessed, pt in chair pre and post treatment  Transfers  Sit to Stand: Stand by assistance  Stand to Sit: Stand by assistance  Ambulation  Surface: Level tile  Device: No Device  Assistance: Stand by

## 2025-02-12 NOTE — DISCHARGE INSTRUCTIONS
[facility] Discharge Instructions    Call for Questions/Concerns:  [phone] ([phone]) [facility] office  The phone number listed above is available 24 hrs/7 days per week  [facility] is open M-F 8am-4:30pm; Sat-Sun/Holidays 8am-1pm    Symptoms to Report Immediately:    Fever of 100.5 or greater  Vomiting without relief after use of anti-nausea medication  Severe abdominal cramping  Diarrhea: More than 3 loose, watery bowel movements in a 24 hour period  Unusual or excessive bleeding from your mouth, nose, rectum, bladder or vagina  Sudden onset of shortness of breath or chest pain  Signs/symptoms of infection: redness, warmth, swelling-particularly to central line site    Report to Physician's office within 24 hours:    Pain not relieved by pain medication  Change in urination-odor, cloudiness, frequency, or pain with urination  Flu-like symptoms  Skin changes-rash, hives, redness or peeling of skin    Additional Instructions:    Avoid people with colds, flu-like symptoms, or any sign of infection  Drink plenty of fluids-attempt to consume 2-3 liters ( ounces) of fluids/24 hour period  Continue low microbial diet until instructed by physician to resume normal diet  Bring all of your medications with you to your doctor's appointments  Bring your current medicine list to each hospital and office visit      You are being discharged with IV access due to need for ongoing therapy.  Below is pertinent information regarding your IV that your next provider may need to know:  Type:  R PICC                        Plan:continue   Next dressing change due on: 2/17/2025  Cap changes due on: 2/18/2025  CVC care and maintenance was reviewed with patient and spouse.  Pt verbalizes understanding of line care and maintenance.      2/12/2025 1:06 PM  Meliza Zapata            My Discharge Checklist    Here at the [facility], we want to make sure you have the help you will need once you leave the  hospital.  We are going to go over your discharge instructions with you. We give these to you in writing so you will have a reference if you have questions about symptoms or problems to look for after you leave the hospital.     We know you want to feel better and get home soon. Please answer these questions so we can be sure you have what you need, your questions are answered, and you feel prepared for discharge.    Yes No Do you understand your diagnosis?  Yes No Do you know when and who you need to follow up with?  Yes No Do you feel ready to go home & take care of your daily needs?  Yes No Do you have the help you need at home?  Yes No Do you understand what medications your are taking?  Yes No Do you understand what your medications are for?  Yes No Do you understand what medication side effects to watch for?  Yes No Do you know what symptoms or health problems that require an immediate call to your physician?  Yes No Do you feel ready for discharge?  Yes No Are there any questions that you have re: how to care for yourself at home?  Yes No [facility]?    If you have any questions after you get home, feel free to call the unit and ask to speak with your nurse.  In about 7-10 days you will receive a survey. We value your opinion and hope that you have received care that will enable you to choose the best scores when completing the survey.    It was our pleasure to take care of you,  [facility] Staff                                                              [facility]   Inpatient Unit           [phone]                                                     Outpatient Infusion [phone]    [facility] Physician Office [phone]  [facility] Testing or Procedural Scheduling [phone] ([address] [place] [address])

## 2025-02-12 NOTE — PROGRESS NOTES
[facility] Progress Note    2025     Karan Fortune    MRN: [id number]    : 1950    Referring MD: No referring provider defined for this encounter.    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          ECOG PS:  (2) Ambulatory and capable of self care, unable to carry out work activity, up and about > 50% or waking hours    KPS: 60% Requires occasional assistance, but is able to care for most of his personal needs    Isolation: None    Medications    Scheduled Meds:   Hydrocerin   Topical BID    enoxaparin  40 mg SubCUTAneous QPM    mirabegron  50 mg Oral Daily    Fesoterodine Fumarate ER  8 mg Oral Daily    QUEtiapine  100 mg Oral Nightly    hydrocortisone   Topical BID    arsenic trioxide (TRISENOX) 13 mg in sodium chloride 0.9 % 250 mL IVPB  13 mg IntraVENous Q24H    cetirizine  10 mg Oral Nightly    donepezil  10 mg Oral Daily    sodium chloride flush  5-40 mL IntraVENous 2 times per day    Saline Mouthwash  15 mL Swish & Spit 4x Daily AC & HS    sodium chloride flush  5-40 mL IntraVENous 2 times per day    tretinoin  22.5 mg/m2 Oral Daily with breakfast    And    tretinoin  22.5 mg/m2 Oral Dinner    levothyroxine  175 mcg Oral Daily     Continuous Infusions:   sodium chloride      sodium chloride      sodium chloride      sodium chloride      sodium chloride 5 mL/hr at 25 0559     PRN Meds:.hydrOXYzine HCl, [COMPLETED] loperamide **FOLLOWED BY** loperamide, benzonatate, melatonin, hydrALAZINE, sodium chloride, magnesium sulfate, prochlorperazine **OR** prochlorperazine, potassium chloride, sodium chloride, traMADol, sodium chloride, sodium chloride flush, sodium chloride, ALTEplase (CATHFLO) 2 mg in sterile water 2 mL injection, sodium chloride flush, sodium chloride    ROS:  As noted above, otherwise remainder of 10-point ROS negative    Physical Exam:     I&O:

## 2025-02-13 VITALS
HEIGHT: 65 IN | BODY MASS INDEX: 31.65 KG/M2 | WEIGHT: 190 LBS | HEART RATE: 97 BPM | SYSTOLIC BLOOD PRESSURE: 150 MMHG | RESPIRATION RATE: 18 BRPM | DIASTOLIC BLOOD PRESSURE: 67 MMHG | OXYGEN SATURATION: 98 % | TEMPERATURE: 97.9 F

## 2025-02-13 LAB
ALBUMIN SERPL-MCNC: 3.8 G/DL (ref 3.4–5)
ALP SERPL-CCNC: 87 U/L (ref 40–129)
ALT SERPL-CCNC: 12 U/L (ref 10–40)
ANION GAP SERPL CALCULATED.3IONS-SCNC: 16 MMOL/L (ref 3–16)
AST SERPL-CCNC: 32 U/L (ref 15–37)
BASOPHILS # BLD: 0 K/UL (ref 0–0.2)
BASOPHILS NFR BLD: 0.6 %
BILIRUB DIRECT SERPL-MCNC: 0.2 MG/DL (ref 0–0.3)
BILIRUB INDIRECT SERPL-MCNC: 0.3 MG/DL (ref 0–1)
BILIRUB SERPL-MCNC: 0.5 MG/DL (ref 0–1)
BUN SERPL-MCNC: 7 MG/DL (ref 7–20)
CALCIUM SERPL-MCNC: 8.6 MG/DL (ref 8.3–10.6)
CHLORIDE SERPL-SCNC: 106 MMOL/L (ref 99–110)
CO2 SERPL-SCNC: 17 MMOL/L (ref 21–32)
CREAT SERPL-MCNC: 0.5 MG/DL (ref 0.8–1.3)
DEPRECATED RDW RBC AUTO: 21.9 % (ref 12.4–15.4)
EKG ATRIAL RATE: 96 BPM
EKG DIAGNOSIS: NORMAL
EKG P AXIS: 51 DEGREES
EKG P-R INTERVAL: 138 MS
EKG Q-T INTERVAL: 372 MS
EKG QRS DURATION: 78 MS
EKG QTC CALCULATION (BAZETT): 469 MS
EKG R AXIS: 15 DEGREES
EKG T AXIS: 59 DEGREES
EKG VENTRICULAR RATE: 96 BPM
EOSINOPHIL # BLD: 0.1 K/UL (ref 0–0.6)
EOSINOPHIL NFR BLD: 1.5 %
GFR SERPLBLD CREATININE-BSD FMLA CKD-EPI: >90 ML/MIN/{1.73_M2}
GLUCOSE SERPL-MCNC: 115 MG/DL (ref 70–99)
HCT VFR BLD AUTO: 24.5 % (ref 40.5–52.5)
HGB BLD-MCNC: 8.2 G/DL (ref 13.5–17.5)
LYMPHOCYTES # BLD: 0.6 K/UL (ref 1–5.1)
LYMPHOCYTES NFR BLD: 9.1 %
MAGNESIUM SERPL-MCNC: 1.94 MG/DL (ref 1.8–2.4)
MCH RBC QN AUTO: 34.9 PG (ref 26–34)
MCHC RBC AUTO-ENTMCNC: 33.6 G/DL (ref 31–36)
MCV RBC AUTO: 104 FL (ref 80–100)
MONOCYTES # BLD: 0.5 K/UL (ref 0–1.3)
MONOCYTES NFR BLD: 7.1 %
NEUTROPHILS # BLD: 5.5 K/UL (ref 1.7–7.7)
NEUTROPHILS NFR BLD: 81.7 %
PHOSPHATE SERPL-MCNC: 4.4 MG/DL (ref 2.5–4.9)
PLATELET # BLD AUTO: 359 K/UL (ref 135–450)
PMV BLD AUTO: 8.8 FL (ref 5–10.5)
POTASSIUM SERPL-SCNC: 4.3 MMOL/L (ref 3.5–5.1)
PROT SERPL-MCNC: 5.9 G/DL (ref 6.4–8.2)
RBC # BLD AUTO: 2.35 M/UL (ref 4.2–5.9)
SODIUM SERPL-SCNC: 139 MMOL/L (ref 136–145)
WBC # BLD AUTO: 6.8 K/UL (ref 4–11)

## 2025-02-13 PROCEDURE — 93005 ELECTROCARDIOGRAM TRACING: CPT | Performed by: INTERNAL MEDICINE

## 2025-02-13 PROCEDURE — 2500000003 HC RX 250 WO HCPCS

## 2025-02-13 PROCEDURE — 85025 COMPLETE CBC W/AUTO DIFF WBC: CPT

## 2025-02-13 PROCEDURE — 84100 ASSAY OF PHOSPHORUS: CPT

## 2025-02-13 PROCEDURE — 80076 HEPATIC FUNCTION PANEL: CPT

## 2025-02-13 PROCEDURE — 6370000000 HC RX 637 (ALT 250 FOR IP): Performed by: STUDENT IN AN ORGANIZED HEALTH CARE EDUCATION/TRAINING PROGRAM

## 2025-02-13 PROCEDURE — 6370000000 HC RX 637 (ALT 250 FOR IP)

## 2025-02-13 PROCEDURE — 93010 ELECTROCARDIOGRAM REPORT: CPT | Performed by: INTERNAL MEDICINE

## 2025-02-13 PROCEDURE — 6360000002 HC RX W HCPCS: Performed by: NURSE PRACTITIONER

## 2025-02-13 PROCEDURE — 83735 ASSAY OF MAGNESIUM: CPT

## 2025-02-13 PROCEDURE — 36592 COLLECT BLOOD FROM PICC: CPT

## 2025-02-13 PROCEDURE — 80048 BASIC METABOLIC PNL TOTAL CA: CPT

## 2025-02-13 RX ORDER — LEVOTHYROXINE SODIUM 175 UG/1
175 TABLET ORAL DAILY
Qty: 30 TABLET | Refills: 3 | Status: SHIPPED | OUTPATIENT
Start: 2025-02-13

## 2025-02-13 RX ORDER — QUETIAPINE FUMARATE 100 MG/1
100 TABLET, FILM COATED ORAL NIGHTLY
Qty: 60 TABLET | Refills: 3 | Status: SHIPPED | OUTPATIENT
Start: 2025-02-13

## 2025-02-13 RX ADMIN — MIRABEGRON 50 MG: 50 TABLET, FILM COATED, EXTENDED RELEASE ORAL at 08:35

## 2025-02-13 RX ADMIN — TRETINOIN 50 MG: 10 CAPSULE ORAL at 08:39

## 2025-02-13 RX ADMIN — LEVOTHYROXINE SODIUM 175 MCG: 150 TABLET ORAL at 05:42

## 2025-02-13 RX ADMIN — MAGNESIUM SULFATE HEPTAHYDRATE 4000 MG: 40 INJECTION, SOLUTION INTRAVENOUS at 05:49

## 2025-02-13 RX ADMIN — Medication: at 08:36

## 2025-02-13 RX ADMIN — DONEPEZIL HYDROCHLORIDE 10 MG: 10 TABLET ORAL at 08:34

## 2025-02-13 RX ADMIN — SODIUM CHLORIDE, PRESERVATIVE FREE 10 ML: 5 INJECTION INTRAVENOUS at 08:36

## 2025-02-13 RX ADMIN — HYDROCORTISONE: 1 CREAM TOPICAL at 08:34

## 2025-02-13 RX ADMIN — FESOTERODINE FUMARATE 8 MG: 8 TABLET, FILM COATED, EXTENDED RELEASE ORAL at 08:35

## 2025-02-13 RX ADMIN — SODIUM CHLORIDE, PRESERVATIVE FREE 10 ML: 5 INJECTION INTRAVENOUS at 08:35

## 2025-02-13 ASSESSMENT — PAIN SCALES - GENERAL: PAINLEVEL_OUTOF10: 0

## 2025-02-13 NOTE — PROGRESS NOTES
Patient seen and assessed for standard line care needs.  CVC site remains free of visible signs and symptoms of infection.  Insertion site red w/ scant yellow dried drainage, pt. Denies tenderness.  No edema, pain, itching, or warmth noted at and around the insertion site.  Line care performed by Violeta Clark RN.  The need for continued use of the CVC is due to ongoing therapy.  Patient verbalized understanding of the line care education provided by line care RN.  Staff RNs will continue to monitor and assess the CVC site throughout the patient's hospital stay.  Sterile dressing changes will continue to be changed per policy.

## 2025-02-13 NOTE — CARE COORDINATION
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) [C95.00]  Leukemia not having achieved remission, unspecified leukemia type (HCC) [C95.90]   Date / Time: 1/21/2025  3:40 PM    Current PCP: Robert Adler MD  Clinic patient: No    Hospitalization in the last 30 days: No       Advance Directives:  Code Status: Full Code  [place] DNR form completed and on chart: Not Indicated    Financial:  Payor: [facility] MEDICARE / Plan: [facility] MEDICARE ADVANTAGE / Product Type: *No Product type* /      Pharmacy:    [id number] - [place], [place] - [address] - P [phone] - F [phone] [address] [address]  Phone: [phone] Fax: [phone]    [facility] [address] - P [phone] - F [phone]  [address]  [facility] [address]  Phone: [phone] Fax: [phone]      Assistance purchasing medications?: Potential Assistance Purchasing Medications: No  Assistance provided by Case Management: None at this time    Does patient want to participate in local refill/ meds to beds program?:      Meds To Beds General Rules:  1. Can ONLY be done Monday- Friday between 8:30am-5pm  2. Prescription(s) must be in pharmacy by 3pm to be filled same day  3.Copy of patient's insurance/ prescription drug card and patient face sheet must be sent along with the prescription(s)  4. Cost of Rx cannot be added to hospital bill. If financial assistance is needed, please contact unit

## 2025-02-13 NOTE — CARE COORDINATION
Reviewed discharge instructions with spouse.  Reviewed discharge medications including dosing, schedule, indication, and adverse reactions.  Reviewed which medications were already taken today and next dosage due for each medication.      Reviewed signs and symptoms that prompt a call to the physician and appropriate phone numbers.  Reviewed follow up appointments that have been made in [facility], wife is aware if [facility] does not call them today to please call and confirm appointment time for Monday, February 17th.     Patient is being discharged with IV access d/t need for ongoing therapy:      Type:  PICC                        Date of placement:  1/22/25    Plan:continue   Next dressing change due on: 2/17/25  Cap changes due on: 2/17/25  Wife verbalized understanding of all instructions and questions were answered to her. satisfaction.  Signed discharge instructions were given to the patient and a copy placed in the paper-lite chart.  Patient discharged to home per wheelchair with spouse.      Marina Pradhan RN

## 2025-02-13 NOTE — PLAN OF CARE
Problem: Safety - Adult  Goal: Free from fall injury  2/12/2025 2231 by Barbara Bueno RN  Outcome: Progressing  Flowsheets (Taken 2/12/2025 2020)  Free From Fall Injury: Instruct family/caregiver on patient safety   Pt is a fall risk and is confused. Pt has a sitter. Pt is on bed alarm.     Problem: ABCDS Injury Assessment  Goal: Absence of physical injury  2/12/2025 2231 by Barbara Bueno RN  Outcome: Progressing  Flowsheets (Taken 2/12/2025 2020)  Absence of Physical Injury: Implement safety measures based on patient assessment   Standard safety measures in place.     Problem: Neurosensory - Adult  Goal: Achieves stable or improved neurological status  Recent Flowsheet Documentation  Taken 2/12/2025 2020 by Barbara Bueno RN  Achieves stable or improved neurological status: Assess for and report changes in neurological status   Alert and oriented x3, disoriented to time.     Problem: Respiratory - Adult  Goal: Achieves optimal ventilation and oxygenation  Recent Flowsheet Documentation  Taken 2/12/2025 2020 by Barbara Bueno RN  Achieves optimal ventilation and oxygenation:   Assess for changes in respiratory status   Assess for changes in mentation and behavior   Pt is on room air.     Problem: Cardiovascular - Adult  Goal: Maintains optimal cardiac output and hemodynamic stability  2/12/2025 2231 by Barbara Bueno RN  Outcome: Progressing  Flowsheets (Taken 2/12/2025 2020)  Maintains optimal cardiac output and hemodynamic stability:   Monitor blood pressure and heart rate   Monitor urine output and notify Licensed Independent Practitioner for values outside of normal range   Assess for signs of decreased cardiac output   Pt is on tele has been tachy.    Problem: Cardiovascular - Adult  Goal: Absence of cardiac dysrhythmias or at baseline  2/12/2025 2231 by Barbara Bueno RN  Outcome: Progressing  Flowsheets (Taken 2/12/2025 2020)  Absence of cardiac dysrhythmias or at baseline:   Monitor cardiac rate and

## (undated) PROCEDURE — 07DR3ZX EXTRACTION OF ILIAC BONE MARROW, PERCUTANEOUS APPROACH, DIAGNOSTIC: ICD-10-PCS

## (undated) PROCEDURE — 079T3ZX DRAINAGE OF BONE MARROW, PERCUTANEOUS APPROACH, DIAGNOSTIC: ICD-10-PCS

## (undated) PROCEDURE — 30233N1 TRANSFUSION OF NONAUTOLOGOUS RED BLOOD CELLS INTO PERIPHERAL VEIN, PERCUTANEOUS APPROACH: ICD-10-PCS